# Patient Record
Sex: MALE | Race: WHITE | NOT HISPANIC OR LATINO | Employment: OTHER | URBAN - METROPOLITAN AREA
[De-identification: names, ages, dates, MRNs, and addresses within clinical notes are randomized per-mention and may not be internally consistent; named-entity substitution may affect disease eponyms.]

---

## 2017-02-14 RX ORDER — NEBIVOLOL HYDROCHLORIDE 5 MG/1
TABLET ORAL
Qty: 90 TABLET | Refills: 0 | Status: SHIPPED | OUTPATIENT
Start: 2017-02-14 | End: 2017-06-01 | Stop reason: SDUPTHER

## 2017-06-01 RX ORDER — NEBIVOLOL 5 MG/1
5 TABLET ORAL DAILY
Qty: 70 TABLET | Refills: 0 | Status: SHIPPED | OUTPATIENT
Start: 2017-06-01 | End: 2017-08-08 | Stop reason: SDUPTHER

## 2017-06-26 ENCOUNTER — APPOINTMENT (OUTPATIENT)
Dept: CT IMAGING | Facility: HOSPITAL | Age: 72
End: 2017-06-26

## 2017-06-26 ENCOUNTER — APPOINTMENT (OUTPATIENT)
Dept: GENERAL RADIOLOGY | Facility: HOSPITAL | Age: 72
End: 2017-06-26

## 2017-06-26 ENCOUNTER — HOSPITAL ENCOUNTER (EMERGENCY)
Facility: HOSPITAL | Age: 72
Discharge: HOME | End: 2017-06-26
Attending: EMERGENCY MEDICINE | Admitting: EMERGENCY MEDICINE

## 2017-06-26 VITALS
BODY MASS INDEX: 21.42 KG/M2 | SYSTOLIC BLOOD PRESSURE: 126 MMHG | RESPIRATION RATE: 16 BRPM | HEART RATE: 80 BPM | OXYGEN SATURATION: 100 % | HEIGHT: 71 IN | DIASTOLIC BLOOD PRESSURE: 84 MMHG | WEIGHT: 153 LBS | TEMPERATURE: 98.3 F

## 2017-06-26 DIAGNOSIS — S00.81XA ABRASION OF CHIN, INITIAL ENCOUNTER: Primary | ICD-10-CM

## 2017-06-26 DIAGNOSIS — S00.81XA ABRASION OF CHEEK, INITIAL ENCOUNTER: ICD-10-CM
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
## 2017-06-26 DIAGNOSIS — S42.202A CLOSED FRACTURE OF PROXIMAL END OF LEFT HUMERUS, UNSPECIFIED FRACTURE MORPHOLOGY, INITIAL ENCOUNTER: ICD-10-CM

## 2017-06-26 PROCEDURE — 99284 EMERGENCY DEPT VISIT MOD MDM: CPT

## 2017-06-26 PROCEDURE — 73030 X-RAY EXAM OF SHOULDER: CPT

## 2017-06-26 PROCEDURE — 70486 CT MAXILLOFACIAL W/O DYE: CPT

## 2017-06-26 RX ORDER — OXYCODONE HYDROCHLORIDE AND ACETAMINOPHEN 5; 325 MG/1; MG/1
1 TABLET ORAL ONCE
Status: COMPLETED | OUTPATIENT
Start: 2017-06-26 | End: 2017-06-26

## 2017-06-26 RX ORDER — OXYCODONE HYDROCHLORIDE AND ACETAMINOPHEN 5; 325 MG/1; MG/1
1-2 TABLET ORAL EVERY 6 HOURS PRN
Qty: 30 TABLET | Refills: 0 | Status: SHIPPED | OUTPATIENT
Start: 2017-06-26 | End: 2017-08-08

## 2017-06-26 RX ADMIN — OXYCODONE HYDROCHLORIDE AND ACETAMINOPHEN 1 TABLET: 5; 325 TABLET ORAL at 20:27

## 2017-06-26 NOTE — ED NOTES
C-collar removed per Dr. Thomason verbal order due to patient not having neck pain/tenderness.      Milana Avila RN  06/26/17 3775

## 2017-06-26 NOTE — ED PROVIDER NOTES
EMERGENCY DEPARTMENT ENCOUNTER    CHIEF COMPLAINT  Chief Complaint: fall  History given by: patient  History limited by: nothing  Room Number: 28/28  PMD: Bear Quiles MD  Orthopedist: Dr. Camacho    HPI:  Pt is a 71 y.o. male who presents complaining of L shoulder pain and facial abrasions (chin and L cheek) s/p [place]. Pt also c/o R jaw pain. Denies LOC, leg pain, dental pain, numbness, and loss of sensation.     Duration:  Few hours  Onset: sudden  Timing: constant  [place]  Quality: mechanical slip and fall  Intensity/Severity: moderate  Progression: unchanged  Associated Symptoms: L shoulder pain, facial abrasion (chin and L cheek)  Previous Episodes: none  Treatment before arrival: none    PAST MEDICAL HISTORY  Active Ambulatory Problems     Diagnosis Date Noted   • Disorder of aorta 08/03/2016   • Hypertension 08/03/2016   • Sleep apnea 08/03/2016   • Aortic valve insufficiency 08/03/2016   • ST segment depression 08/03/2016   • Nonrheumatic aortic valve stenosis 08/03/2016   • Paroxysmal atrial fibrillation 08/03/2016   • Hx of nodular lymphoma 11/14/2016     Resolved Ambulatory Problems     Diagnosis Date Noted   • No Resolved Ambulatory Problems     Past Medical History:   Diagnosis Date   • Aortic insufficiency    • Heart murmur    • Lymphoma, non-Hodgkin's    • Polyarthralgia 06/1997       PAST SURGICAL HISTORY  Past Surgical History:   Procedure Laterality Date   • AXILLARY LYMPH NODE BIOPSY/EXCISION Left 12/1996   • HEMORRHOIDECTOMY  1976, 1977   • PROSTATE BIOPSY  03/2007       FAMILY HISTORY  Family History   Problem Relation Age of Onset   • Heart failure Mother    • Heart failure Father    • Lymphoma Maternal Aunt        SOCIAL HISTORY  Social History     Social History   • Marital status:      Spouse name: N/A   • Number of children: 1   • Years of education: N/A     Occupational History   •       Social  History Main Topics   • Smoking status: Never Smoker   • Smokeless tobacco: Not on file   • Alcohol use Yes      Comment: Occasional beer   • Drug use: Not on file   • Sexual activity: Not on file     Other Topics Concern   • Not on file     Social History Narrative       ALLERGIES  Review of patient's allergies indicates no known allergies.    REVIEW OF SYSTEMS  Review of Systems   Constitutional: Negative for activity change, appetite change and fever.   HENT: Negative for congestion, dental problem and sore throat.         R jaw pain   Eyes: Negative.    Respiratory: Negative for cough and shortness of breath.    Cardiovascular: Negative for chest pain and leg swelling.   Gastrointestinal: Negative for abdominal pain, diarrhea and vomiting.   Endocrine: Negative.    Genitourinary: Negative for decreased urine volume and dysuria.   Musculoskeletal: Positive for arthralgias (L shoulder). Negative for neck pain.   Skin: Positive for wound (abrasion to chin and L cheek). Negative for rash.   Allergic/Immunologic: Negative.    Neurological: Negative for syncope, weakness, numbness and headaches.   Hematological: Negative.    Psychiatric/Behavioral: Negative.    All other systems reviewed and are negative.      PHYSICAL EXAM  ED Triage Vitals   Temp Heart Rate Resp BP SpO2   06/26/17 1635 06/26/17 1635 06/26/17 1635 06/26/17 1635 06/26/17 1635   98.3 °F (36.8 °C) 80 16 114/76 97 %      Temp src Heart Rate Source Patient Position BP Location FiO2 (%)   06/26/17 1635 06/26/17 1635 -- -- --   Tympanic Monitor          Physical Exam   Constitutional: He is oriented to person, place, and time and well-developed, well-nourished, and in no distress. No distress.   HENT:   R mandible tenderness. Tenderness, swelling, and abrasion on chin. Abrasion over L maxilla.    Eyes: EOM are normal.   Neck: Normal range of motion.   Cardiovascular: Normal rate and regular rhythm.    Pulmonary/Chest: Effort normal and breath sounds normal.  No respiratory distress.   Musculoskeletal:   Neck is nontender. Tenderness over L shoulder with limited ROM. NVI.    Neurological: He is alert and oriented to person, place, and time. He has normal sensation and normal strength.   Skin: Skin is warm and dry.   Psychiatric: Mood and affect normal.   Nursing note and vitals reviewed.      RADIOLOGY  CT Facial Bones Without Contrast   Preliminary Result   1.  No facial bone fractures are seen.   2.  Minimal mucosal thickening in the maxillary sinuses.   3.  Tiny radiodensities in the skin of the lower face. Please correlate.          XR Shoulder 2+ View Left     FINDINGS: There is a fracture through the neck of the humerus with  slight rotation of the humeral head relative to the humeral shaft.     This report was finalized on 6/26/2017 6:43 PM by Dr. Jorge Kendrick MD.        I ordered the above noted radiological studies. Interpreted by radiologist. Discussed with radiologist. Reviewed by me in PACS.       PROCEDURES  Procedures      PROGRESS AND CONSULTS  ED Course     7:46 PM: Ordered CT facial bones for further evaluation.    8:08 PM: Ordered Percocet for pain management.    8:45 PM:  BP: 137/86 HR: 82 Temp: 98.3 °F (36.8 °C) (Tympanic) O2 sat: 100% on RA  Rechecked pt. Notified pt that CT facial bones does not show any signs of fracture, but there are some possible dense foreign bodies in the soft tissue.  There are only a couple of small superficial abrasions to the chin and they do not match up with the location of this material seen on the CT.   D/w pt plan to consult oral and maxillofacial    8:57 PM: Placed call to oral and maxillofacial for consult    9:38 PM:  Discussed pt's case with Dr. Shakir Vaughn (Oral and Maxillofacial Surgery), who agrees to see patient in office for eval.      9:43 PM: Placed call to ortho for consult.  Discussed with Dr. Wilson.  Will have patient follow up with Dr. Hung in the office.    9:54 PM:  BP: 137/86 HR: 82 Temp:  98.3 °F (36.8 °C) (Tympanic) O2 sat: 100% on RA    Rechecked pt. D/w pt plan for discharge with splint. Pt to f/u with Oral and Maxillofacial Surgery and with Orthopedics. Pt understands and agrees with the plan, all questions answered.       MEDICAL DECISION MAKING  Results were reviewed/discussed with the patient and they were also made aware of online access.    MDM  Number of Diagnoses or Management Options  Abrasion of cheek, initial encounter:   Abrasion of chin, initial encounter:   Fall down hill, initial encounter:   Humeral head fracture, left, closed, initial encounter:      Amount and/or Complexity of Data Reviewed  Tests in the radiology section of CPT®: reviewed and ordered (XR L shoulder shows humeral neck fracture. CT facial bones does not show any fractures.)           DIAGNOSIS  Final diagnoses:   Abrasion of chin, initial encounter   Abrasion of cheek, initial encounter   Fall down hill, initial encounter   Closed fracture of proximal end of left humerus, unspecified fracture morphology, initial encounter       DISPOSITION  DISCHARGE    Patient discharged in stable condition.    Reviewed implications of results, diagnosis, meds, responsibility to follow up, warning signs and symptoms of possible worsening, potential complications and reasons to return to ER.    Patient/Family voiced understanding of above instructions.    Discussed plan for discharge, as there is no emergent indication for admission.  Pt/family is agreeable and understands need for follow up and repeat testing.  Pt is aware that discharge does not mean that nothing is wrong but it indicates no emergency is present that requires admission and they must continue care with follow-up as given below or physician of their choice.     FOLLOW-UP  Shakir Vaughn, IVONE  [address] [id number]  [phone]    Schedule an appointment as soon as possible for a visit      Vikas Wilson MD  [address] [facility]  [facility]  [address]  [place] [id number] [phone]    Schedule an appointment as soon as possible for a visit      Bear Quiles MD  [facility] [address] [id number]  [phone]      As needed         Medication List      New Prescriptions          oxyCODONE-acetaminophen 5-325 MG per tablet   Commonly known as:  PERCOCET   Take 1-2 tablets by mouth Every 6 (Six) Hours As Needed for Moderate Pain   (4-6).             Latest Documented Vital Signs:  As of 10:07 PM  BP- 137/86 HR- 82 Temp- 98.3 °F (36.8 °C) (Tympanic) O2 sat- 100%    --  Documentation assistance provided by jeremy Meza for Dr. Thomason.  Information recorded by the scribe was done at my direction and has been verified and validated by me.     Ermias Meza  06/26/17 2208       Carlos Thomason MD  06/28/17 0033

## 2017-06-27 ENCOUNTER — TELEPHONE (OUTPATIENT)
Dept: ORTHOPEDIC SURGERY | Facility: CLINIC | Age: 72
End: 2017-06-27

## 2017-06-28 ENCOUNTER — OFFICE VISIT (OUTPATIENT)
Dept: ORTHOPEDIC SURGERY | Facility: CLINIC | Age: 72
End: 2017-06-28

## 2017-06-28 VITALS — BODY MASS INDEX: 21.7 KG/M2 | WEIGHT: 155 LBS | TEMPERATURE: 99.3 F | HEIGHT: 71 IN

## 2017-06-28 DIAGNOSIS — S49.92XA SHOULDER INJURY, LEFT, INITIAL ENCOUNTER: Primary | ICD-10-CM

## 2017-06-28 PROCEDURE — 73030 X-RAY EXAM OF SHOULDER: CPT | Performed by: ORTHOPAEDIC SURGERY

## 2017-06-28 PROCEDURE — 99204 OFFICE O/P NEW MOD 45 MIN: CPT | Performed by: ORTHOPAEDIC SURGERY

## 2017-06-28 PROCEDURE — 23600 CLTX PROX HUMRL FX W/O MNPJ: CPT | Performed by: ORTHOPAEDIC SURGERY

## 2017-06-28 RX ORDER — SERTRALINE HYDROCHLORIDE 100 MG/1
100 TABLET, FILM COATED ORAL DAILY
COMMUNITY
End: 2020-02-11

## 2017-06-28 RX ORDER — CLONAZEPAM 0.5 MG/1
0.5 TABLET ORAL
COMMUNITY
End: 2019-12-17

## 2017-06-29 NOTE — PROGRESS NOTES
History & Physical       Patient: Leroy Richardson    YOB: 1945    Medical Record Number: [id number]    Attending Physician: No att. providers found    Chief Complaints: Left shoulder injury    History of Present Illness: 71 y.o. male presents for evaluation of his left shoulder.  He fell on June 26 while golfing.  He tells me that he struck his face and left shoulder as well as the anterior aspect of both knees.  The face and knees are doing fine.  The shoulder has continued to cause him pain.  He was seen in the emergency room and diagnosed with a proximal humerus fracture.  I was on call and so he was subsequently referred here.  He describes his current shoulder pain as moderate, intermittent and aching.  He has noticed associated bruising and swelling.  The pain is somewhat better with ice, anti-inflammatories and use of the sling.  Of note, he is active and independent.  He is retired but he continues to be quite active in his intermediate.  He is right-hand-dominant.    Allergies: No Known Allergies    Home Medications:      Current Outpatient Prescriptions:   •  aspirin (ASPIRIN ADULT LOW STRENGTH) 81 MG EC tablet, Take  by mouth daily., Disp: , Rfl:   •  clonazePAM (KlonoPIN) 0.5 MG tablet, Take 0.5 mg by mouth 2 (Two) Times a Day As Needed for Seizures., Disp: , Rfl:   •  finasteride (PROSCAR) 5 MG tablet, Take 5 mg by mouth daily., Disp: , Rfl:   •  nebivolol (BYSTOLIC) 5 MG tablet, Take 1 tablet by mouth Daily., Disp: 70 tablet, Rfl: 0  •  Omega-3 Fatty Acids (FISH OIL) 1200 MG capsule capsule, Take  by mouth., Disp: , Rfl:   •  oxyCODONE-acetaminophen (PERCOCET) 5-325 MG per tablet, Take 1-2 tablets by mouth Every 6 (Six) Hours As Needed for Moderate Pain (4-6)., Disp: 30 tablet, Rfl: 0  •  sertraline (ZOLOFT) 100 MG tablet, Take 100 mg by mouth Daily., Disp: , Rfl:     Past Medical History:   Diagnosis Date   • Aortic insufficiency     Mild   • Heart murmur    • Lymphoma,  "non-Hodgkin's    • Polyarthralgia 06/1997          Past Surgical History:   Procedure Laterality Date   • AXILLARY LYMPH NODE BIOPSY/EXCISION Left 12/1996   • HEMORRHOIDECTOMY  1976, 1977   • PROSTATE BIOPSY  03/2007          Social History     Occupational History   •       Social History Main Topics   • Smoking status: Never Smoker   • Smokeless tobacco: Not on file   • Alcohol use Yes      Comment: Occasional beer   • Drug use: Not on file   • Sexual activity: Defer      Social History     Social History Narrative          Family History   Problem Relation Age of Onset   • Heart failure Mother    • Heart failure Father    • Lymphoma Maternal Aunt        Review of Systems:      Constitutional: Denies fever, shaking or chills   Eyes: Denies change in visual acuity   HEENT: Denies nasal congestion or sore throat   Respiratory: Denies cough or shortness of breath   Cardiovascular: Denies chest pain or edema  Endocrine: Denies tremors, palpitations, intolerance of heat or cold, polyuria, polydipsia.  GI: Denies abdominal pain, nausea, vomiting, bloody stools or diarrhea  : Denies frequency, urgency, incontinence, retention, or nocturia.  Musculoskeletal: Denies numbness tingling or loss of motor function except as above  Integument: Denies rash, lesion or ulceration   Neurologic: Denies headache or focal weakness, deficits  Heme: Denies epistaxis, spontaneous or excessive bleeding, epistaxis, hematuria, melena, fatigue, enlarged or tender lymph nodes.      All other pertinent positives and negatives as noted above in HPI.    Physical Exam: 71 y.o. male    Vitals:    06/28/17 1324   Temp: 99.3 °F (37.4 °C)   TempSrc: Temporal Artery    Weight: 155 lb (70.3 kg)   Height: 71\" (180.3 cm)       General:  Patient is awake and alert.  He is oriented to person, place, and time.  He does have a facial contusion and abrasion over the left side of his face.  Appears in no acute distress or discomfort.    Psych:  " Affect and demeanor are appropriate.    Eyes:  Conjunctiva and sclera appear grossly normal.  Eyes track well and EOM seem to be intact.    Dentition:  No gross abnormalities noted.    Ears:  No gross abnormalities.  Hearing adequate for the exam.    Cardiovascular:  Regular rate and rhythm.    Lungs:  Good chest expansion.  Breathing unlabored.    Lymph:  No palpable masses or adenopathy in the affected extremity    Left upper extremity:  Slingwas in place and removed.  Skin appears benign.  No gross malalignment, lacerations or abrasions.  Focal tenderness noted over the proximal humerus.  No palpable masses or adenopathy.  Compartments soft.  Painful, limited ROM of the shoulder.  Could not assess instability due to his discomfort with movement..  No tenderness noted over the lower arm, elbow, forearm or wrist.  Good strength in the wrist and hand with flexion, extension, , and pinch.  Intact sensation.  Brisk cap refill.  Good skin turgor.  Palpable radial pulse.    Bilateral lower extremities are briefly examined.  He has abrasions and contusions over the anterior aspect of both knees.  He is mildly tender in this area but there is no discrete bony tenderness nor are there any step-offs or crepitus.  He has full symmetric knee motion.  He has excellent strength with resisted knee extension.  His gait is grossly normal.      Diagnostic Tests:  Lab Results   Component Value Date    GLUCOSE 92 11/14/2016    CALCIUM 9.6 11/14/2016     11/14/2016    K 4.8 (H) 11/14/2016    CO2 26.2 11/14/2016    CL 95 (L) 11/14/2016    BUN 12 11/14/2016    CREATININE 0.73 11/14/2016    EGFRIFNONA 107 11/14/2016    BCR 16.4 11/14/2016    ANIONGAP 12.8 11/14/2016     Lab Results   Component Value Date    WBC 7.41 11/14/2016    HGB 14.9 11/14/2016    HCT 42.3 11/14/2016    MCV 86.0 11/14/2016     11/14/2016     No results found for: INR, PROTIME    Imaging:  Outside x-rays on the [facility] are reviewed.  These are  compared to repeat AP and scapular Y x-rays taken today in the office.  Postoperative x-rays show an impacted but minimally displaced three-part proximal humerus fracture.    Assessment:  1.  Left proximal humerus fracture  2.  Bilateral knee contusions      Plan: We had a thorough discussion regarding the risks of non-surgical management versus surgery.  With closed treatment, there is the risk of further displacement, malunion, nonunion or persistent pain or loss of motion/function that could require further intervention in the future.  I feel that this injury is amenable to closed treatment.  If it heals in his current alignment, I expect that he should do well.  We do however need to follow him closely.  I want to see him back in another week for repeat x-rays.  If things displace, we may have to reevaluate.  The patient voiced understanding of the risks, benefits, and alternative forms of treatment that were discussed and the patient consents to proceed with closed treatment.  We will have him continue use of the sling.  Again, I will see him back next week for repeat x-rays.    Date: 6/29/2017    Jarrett Hung MD    CC to Bear Quiles MD

## 2017-07-10 ENCOUNTER — OFFICE VISIT (OUTPATIENT)
Dept: ORTHOPEDIC SURGERY | Facility: CLINIC | Age: 72
End: 2017-07-10

## 2017-07-10 VITALS — WEIGHT: 155 LBS | BODY MASS INDEX: 21.7 KG/M2 | HEIGHT: 71 IN

## 2017-07-10 DIAGNOSIS — Z87.81 HISTORY OF FRACTURE OF HUMERUS: Primary | ICD-10-CM

## 2017-07-10 PROCEDURE — 99024 POSTOP FOLLOW-UP VISIT: CPT | Performed by: ORTHOPAEDIC SURGERY

## 2017-07-10 PROCEDURE — 73030 X-RAY EXAM OF SHOULDER: CPT | Performed by: ORTHOPAEDIC SURGERY

## 2017-07-10 NOTE — PROGRESS NOTES
Mr. Richardson comes in today for follow-up of the left shoulder.  He tells me that he has been fully compliant with use of the sling.  Denies any problems or issues.    He has some resolving ecchymosis down the arm.  Contour of the arm is normal.  Pendulums are well-tolerated.    AP and scapular Y views of the left shoulder are ordered and reviewed.  These are compared to previous x-rays.  No concerning findings noted.  Alignment appears unchanged.    Assessment: 2 weeks status post closed treatment of left proximal humerus fracture    Plan: He seems to be doing well.  I will have him follow-up with me in another 2 weeks.  I instructed him on early range of motion exercises at this point as well as appropriate continued activity restrictions.

## 2017-07-11 ENCOUNTER — TELEPHONE (OUTPATIENT)
Dept: ORTHOPEDIC SURGERY | Facility: CLINIC | Age: 72
End: 2017-07-11

## 2017-07-24 ENCOUNTER — OFFICE VISIT (OUTPATIENT)
Dept: ORTHOPEDIC SURGERY | Facility: CLINIC | Age: 72
End: 2017-07-24

## 2017-07-24 VITALS — HEIGHT: 71 IN | WEIGHT: 154 LBS | BODY MASS INDEX: 21.56 KG/M2 | TEMPERATURE: 98.2 F

## 2017-07-24 DIAGNOSIS — S42.202D CLOSED FRACTURE OF PROXIMAL END OF LEFT HUMERUS WITH ROUTINE HEALING, UNSPECIFIED FRACTURE MORPHOLOGY, SUBSEQUENT ENCOUNTER: Primary | ICD-10-CM

## 2017-07-24 PROCEDURE — 73030 X-RAY EXAM OF SHOULDER: CPT | Performed by: ORTHOPAEDIC SURGERY

## 2017-07-24 PROCEDURE — 99024 POSTOP FOLLOW-UP VISIT: CPT | Performed by: ORTHOPAEDIC SURGERY

## 2017-07-24 NOTE — PROGRESS NOTES
"Leroy Richardson : 1945 MRN: [id number] DATE: 2017    CC: Closed treatment of left proximal humerus fracture    HPI: Pt. returns to clinic today for follow-up.  Ports that the pain continues to improve and is minimal at this point.  Motion is progressing.  Denies any new concerns or issues.  Vitals:    17 0939   Temp: 98.2 °F (36.8 °C)   TempSrc: Temporal Artery    Weight: 154 lb (69.9 kg)   Height: 71\" (180.3 cm)     Exam:   Contour of shoulder appears normal.  Skin intact and benign.  Arm and forearm soft.  Shoulder motion is limited but well tolerated.  Good motor and sensory function distally.  Palpable pulses with good cap refill.      Imaging   2v xrays including AP and scapular Y are ordered and reviewed by me to evaluate alignment and for comparison purposes.  No new or concerning findings noted. Fracture appears in unchanged alignment and there has been interval callous formation    Impression:   6 weeks s/p closed treatment of left proximal humerus fracture    Plan:    1.  Continue to work on progressive range of motion.  Will start formal Pt.  2.  F/u in 6 weeks with repeat 2v xrays.  3.  Counseled the patient about appropriate activity modifications and restrictions, including noheavy lifting, pushing or pulling    Jarrett Hung MD   "

## 2017-07-31 ENCOUNTER — TREATMENT (OUTPATIENT)
Dept: PHYSICAL THERAPY | Facility: CLINIC | Age: 72
End: 2017-07-31

## 2017-07-31 DIAGNOSIS — M25.512 CHRONIC LEFT SHOULDER PAIN: ICD-10-CM

## 2017-07-31 DIAGNOSIS — S42.295D OTHER CLOSED NONDISPLACED FRACTURE OF PROXIMAL END OF LEFT HUMERUS WITH ROUTINE HEALING, SUBSEQUENT ENCOUNTER: Primary | ICD-10-CM

## 2017-07-31 DIAGNOSIS — G89.29 CHRONIC LEFT SHOULDER PAIN: ICD-10-CM

## 2017-07-31 PROCEDURE — 97110 THERAPEUTIC EXERCISES: CPT | Performed by: PHYSICAL THERAPIST

## 2017-07-31 PROCEDURE — 97161 PT EVAL LOW COMPLEX 20 MIN: CPT | Performed by: PHYSICAL THERAPIST

## 2017-07-31 PROCEDURE — G8984 CARRY CURRENT STATUS: HCPCS | Performed by: PHYSICAL THERAPIST

## 2017-07-31 PROCEDURE — G8985 CARRY GOAL STATUS: HCPCS | Performed by: PHYSICAL THERAPIST

## 2017-07-31 NOTE — PATIENT INSTRUCTIONS
Please view [facility] Leroy Richardson for a complete list of HEP instructions.  Healing time frames, A&P of symptoms.   PT course of care, treatment outcomes.

## 2017-07-31 NOTE — PROGRESS NOTES
Physical Therapy Initial Evaluation    Patient Name: Leroy Richardson       Patient MRN: [id number]  : 1945  Physician:Jarrett Hung MD  Date: 2017    Encounter Diagnoses   Name Primary?   • Other closed nondisplaced fracture of proximal end of left humerus with routine healing, subsequent encounter Yes   • Chronic left shoulder pain        Subjective Evaluation    History of Present Illness  Date of onset: 2017  Mechanism of injury: Pt reports he fell on 2017 when playing golf, going down a slope and fell face down.  Went to the [facility] ER, did xrays of the shoulder and CT scan of the head.   Went to see Dr. Hung, put in sling 1 month, came out of the sling last week. Reports he was doing pendulums when in the sling and has been doing wall walks since last week.   Pt reports he has had 3 falls in the last year.   Pt denies N&T or waking up at night due to pain.   PMH: Non-Hodgkins lympohoma , been in remission since . Hx of hear murmur. Sees oncologist and cardiologist regularly       Patient Occupation: Part time for [employer]   Precautions and Work Restrictions: No lifting more than 2 lbs. Quality of life: excellent    Pain  At best pain ratin  At worst pain ratin  Location: Middle deltoid  Quality: Stabbing.  Relieving factors: ice  Aggravating factors: lifting (Wall walk exercise)  Progression: improved    Social Support  Lives in: one-story house  Lives with: spouse    Hand dominance: right    Diagnostic Tests  X-ray: abnormal (Proximal humerus )  CT scan: normal (Head)    Treatments  Previous treatment: medication (PT for balance. Not currently taking any meds)  Patient Goals  Patient goals for therapy: decreased pain  Patient goal: 2 weeks. Pt will:  1. Be independent with initial HEP  2. Report pain </= 5/10 with all ADL's  3. Demonstrate improved left GH flexion AROM >/= 115  4. Demonstrate improved left GH abduction AROM >/= 90    4 weeks. Pt will:  1. Report  pain of </= 1/10 with all ADLs  2. Demonstrate improved left GH flexion, abduction, ER MMT of >/= 4+/5  3. Demonstrate improved left GH AROM flexion of >/= 135  4. Return to tucking in shirt/putting on a belt with the left arm with no increased pain  5. QuickDASH </= 40%, corresponding with  CJ           Objective     Postural Observations  Seated posture: fair (Moderate increased thoracic kyphosis)        Observations     Additional Observation Details  Mild eccymosis at left anterior deltoid    Palpation   Left   Tenderness of the middle deltoid.     Cervical/Thoracic Screen   Thoracic range of motion within normal limits with the following exceptions: Limited extension    Neurological Testing   Sensation     Shoulder   Left Shoulder   Intact: light touch    Right Shoulder   Intact: light touch    Reflexes   Left   Biceps (C5/C6): normal (2+)    Right   Biceps (C5/C6): normal (2+)    Active Range of Motion   Left Shoulder   Flexion: 90 degrees with pain  Abduction: 74 degrees with pain  External rotation 45°: 64 degrees   Internal rotation BTB: L4     Right Shoulder   Flexion: 145 degrees   Abduction: 130 degrees   External rotation 45°: 88 degrees   Internal rotation BTB: T10     Passive Range of Motion   Left Shoulder   Flexion: 120 degrees   Abduction: 90 degrees   External rotation 45°: 65 degrees     Additional Passive Range of Motion Details  All PROM limited due to pain    Strength/Myotome Testing     Left Shoulder     Planes of Motion   Flexion: 2+   Abduction: 2+   External rotation at 0°: 3+ (PAIN)   Internal rotation at 0°: 4     Isolated Muscles   Biceps: 4+   Triceps: 4     Right Shoulder     Planes of Motion   Flexion: 4+   Abduction: 4+   External rotation at 0°: 4+   Internal rotation at 0°: 4+     Isolated Muscles   Biceps: 5   Triceps: 5     Tests     Additional Tests Details  All special testing deferred       Assessment & Plan     Assessment  Impairments: abnormal or restricted ROM,  impaired physical strength, lacks appropriate home exercise program and pain with function  Assessment details: Leroy Richardson is a pleasant 71 y.o. male that presents with signs and symptoms consistent with the above diagnosis.   Pt will benefit from skilled PT services in order to address listed impairments and increase tolerance to normal daily activities including ADL's, work and recreational activities.    Prognosis: good    Goals  2 weeks. Pt will:  1. Be independent with initial HEP  2. Report pain </= 5/10 with all ADL's  3. Demonstrate improved left GH flexion AROM >/= 115  4. Demonstrate improved left GH abduction AROM >/= 90    4 weeks. Pt will:  1. Report pain of </= 1/10 with all ADLs  2. Demonstrate improved left GH flexion, abduction, ER MMT of >/= 4+/5  3. Demonstrate improved left GH AROM flexion of >/= 135  4. Return to tucking in shirt/putting on a belt with the left arm with no increased pain  5. QuickDASH </= 40%, corresponding with  CJ      Plan  Therapy options: will be seen for skilled physical therapy services  Planned modality interventions: cryotherapy, electrical stimulation/Burmese stimulation, ultrasound and thermotherapy (hydrocollator packs)  Planned therapy interventions: manual therapy, neuromuscular re-education, postural training, soft tissue mobilization, spinal/joint mobilization, stretching, strengthening, joint mobilization and home exercise program  Frequency: 2x week  Duration in weeks: 4  Treatment plan discussed with: patient  Functional Limitations: carrying objects, lifting, pulling, pushing, uncomfortable because of pain, reaching behind back and reaching overhead      Therapy Exercise 30930 10 minutes    Timed Code Treatment: 10   Minutes     Total Treatment Time: 50      Minutes    Lashell Parry, PT, DPT  Physical Therapist    Medicare Initial Certification  Certification Period: 10/29/2017  I certify that the therapy services are furnished while this  patient is under my care.  The services outlined above are required by this patient, and will be reviewed every 90 days.     PHYSICIAN: Jarrett Hung MD      DATE:     Please sign and return via fax to [phone].. Thank you, [facility].

## 2017-08-04 ENCOUNTER — TREATMENT (OUTPATIENT)
Dept: PHYSICAL THERAPY | Facility: CLINIC | Age: 72
End: 2017-08-04

## 2017-08-04 DIAGNOSIS — M25.512 CHRONIC LEFT SHOULDER PAIN: ICD-10-CM

## 2017-08-04 DIAGNOSIS — G89.29 CHRONIC LEFT SHOULDER PAIN: ICD-10-CM

## 2017-08-04 DIAGNOSIS — S42.295D OTHER CLOSED NONDISPLACED FRACTURE OF PROXIMAL END OF LEFT HUMERUS WITH ROUTINE HEALING, SUBSEQUENT ENCOUNTER: Primary | ICD-10-CM

## 2017-08-04 PROCEDURE — 97110 THERAPEUTIC EXERCISES: CPT | Performed by: PHYSICAL THERAPIST

## 2017-08-04 PROCEDURE — 97140 MANUAL THERAPY 1/> REGIONS: CPT | Performed by: PHYSICAL THERAPIST

## 2017-08-04 NOTE — PROGRESS NOTES
Physical Therapy Daily Progress Note      Leroy Richardson reports: compliance with HEP. Denies pain, reports soreness with the exercises.       Objective   See Exercise, Manual, and Modality Logs for complete treatment.     Assessment/Plan  Pt performed well with all progressed UE strengthening, denied pain.  Pt demonstrate improvement in AAROM since initialy visit.  Pt would benefit from additional skilled PT to continue to progress left UE ROM and strength.    Progress strengthening /stabilization /functional activity    Manual PT [id number] 10 minutes and Therapy Exercise [id number] 30 minutes    Timed Code Treatment: 40   Minutes     Total Treatment Time: 50      Minutes    Lashell Parry, PT, DPT  Physical Therapist #[id number]

## 2017-08-08 ENCOUNTER — OFFICE VISIT (OUTPATIENT)
Dept: CARDIOLOGY | Facility: CLINIC | Age: 72
End: 2017-08-08

## 2017-08-08 VITALS
HEIGHT: 71 IN | SYSTOLIC BLOOD PRESSURE: 125 MMHG | HEART RATE: 69 BPM | DIASTOLIC BLOOD PRESSURE: 76 MMHG | WEIGHT: 156 LBS | BODY MASS INDEX: 21.84 KG/M2

## 2017-08-08 DIAGNOSIS — I48.0 PAROXYSMAL ATRIAL FIBRILLATION (HCC): ICD-10-CM

## 2017-08-08 DIAGNOSIS — I35.1 NONRHEUMATIC AORTIC VALVE INSUFFICIENCY: ICD-10-CM

## 2017-08-08 DIAGNOSIS — I48.92 ATRIAL FIBRILLATION AND FLUTTER (HCC): Primary | ICD-10-CM

## 2017-08-08 DIAGNOSIS — I10 ESSENTIAL HYPERTENSION: ICD-10-CM

## 2017-08-08 DIAGNOSIS — G47.33 OBSTRUCTIVE SLEEP APNEA SYNDROME: ICD-10-CM

## 2017-08-08 DIAGNOSIS — I10 HTN (HYPERTENSION), BENIGN: ICD-10-CM

## 2017-08-08 DIAGNOSIS — I35.0 NONRHEUMATIC AORTIC VALVE STENOSIS: ICD-10-CM

## 2017-08-08 DIAGNOSIS — I48.91 ATRIAL FIBRILLATION AND FLUTTER (HCC): Primary | ICD-10-CM

## 2017-08-08 PROCEDURE — 93000 ELECTROCARDIOGRAM COMPLETE: CPT | Performed by: INTERNAL MEDICINE

## 2017-08-08 PROCEDURE — 99214 OFFICE O/P EST MOD 30 MIN: CPT | Performed by: INTERNAL MEDICINE

## 2017-08-08 RX ORDER — NEBIVOLOL 5 MG/1
5 TABLET ORAL DAILY
Qty: 90 TABLET | Refills: 3 | Status: SHIPPED | OUTPATIENT
Start: 2017-08-08 | End: 2019-12-17 | Stop reason: SDUPTHER

## 2017-08-08 NOTE — PROGRESS NOTES
[facility]  Cardiology Office Visit Note        Subjective:     Encounter Date:2017      Patient ID: Leroy Richardson   Age: 71 y.o.  Sex: male  :  1945  MRN: [id number]             Date of Office Visit: 2017  Encounter Provider: Willie Cox MD  Place of Service: [facility]     .    Chief Complaint:  History of Present Illness    The following portions of the patient's history were reviewed and updated as appropriate: allergies, current medications, past family history, past medical history, past social history, past surgical history and problem list.    Review of Systems   Constitution: Negative for chills, fever and weight gain.   HENT: Negative for congestion, headaches, hearing loss and sore throat.    Eyes: Negative for blurred vision and double vision.   Cardiovascular: Negative for chest pain, claudication, cyanosis, dyspnea on exertion, irregular heartbeat, leg swelling, near-syncope, orthopnea, palpitations, paroxysmal nocturnal dyspnea and syncope.   Respiratory: Positive for snoring. Negative for cough, shortness of breath and wheezing.    Endocrine: Negative for cold intolerance.   Hematologic/Lymphatic: Negative for adenopathy. Does not bruise/bleed easily.   Skin: Negative for rash.   Musculoskeletal: Negative for back pain.   Gastrointestinal: Negative for abdominal pain, change in bowel habit, constipation, diarrhea, nausea and vomiting.   Genitourinary: Negative for dysuria, frequency, hematuria and hesitancy.   Neurological: Positive for loss of balance. Negative for disturbances in coordination, excessive daytime sleepiness, dizziness, focal weakness, light-headedness and numbness.   Psychiatric/Behavioral: Positive for depression. Negative for memory loss. The patient is not nervous/anxious.    Allergic/Immunologic: Negative for hives.         ECG 12 Lead  Date/Time: 2017 9:08 AM  Performed by: RAZ GARCÍA  MAN SINGH  Authorized by: MAN CRANDALL JR   Comparison: compared with previous ECG   Similar to previous ECG  Rhythm: sinus rhythm  BPM: 70  Clinical impression: normal ECG                       HPI     The patient is a 71-year-old white male with history of mild to moderate aortic stenosis and aortic regurgitation, history of sleep apnea and hypertension, who presents for cardiac follow-up.  I last saw him in the office in August 2016.  Since then, he denies chest pain.  No PND, orthopnea or pedal edema.  No palpitations dizziness or syncope.  His blood pressure has been good.  He is lost some weight due to depression and anxiety.  He is put on clonazepam and sertraline and now is better.  He is gaining weight agai he recently fell and broke his humerus on his left arm.  The cast is off at this time.    Cardiac risk factors: No family history of early CAD.  No diabetes.  No smoking.  Normal cholesterol.  Positive or hypertension.  .          Past Medical History:   Diagnosis Date   • Aortic insufficiency     Mild   • Heart murmur    • Lymphoma, non-Hodgkin's    • Polyarthralgia 06/1997       Past Surgical History:   Procedure Laterality Date   • AXILLARY LYMPH NODE BIOPSY/EXCISION Left 12/1996   • HEMORRHOIDECTOMY  1976, 1977   • PROSTATE BIOPSY  03/2007       Social History     Social History   • Marital status:      Spouse name: N/A   • Number of children: 1   • Years of education: N/A     Occupational History   •       Social History Main Topics   • Smoking status: Never Smoker   • Smokeless tobacco: Not on file   • Alcohol use Yes      Comment: Occasional beer   • Drug use: Not on file   • Sexual activity: Defer     Other Topics Concern   • Not on file     Social History Narrative       Family History   Problem Relation Age of Onset   • Heart failure Mother    • Heart failure Father    • Lymphoma Maternal Aunt            Scheduled Meds:  Current Outpatient Prescriptions on File Prior to  "Visit   Medication Sig Dispense Refill   • aspirin (ASPIRIN ADULT LOW STRENGTH) 81 MG EC tablet Take  by mouth daily.     • clonazePAM (KlonoPIN) 0.5 MG tablet Take 0.5 mg by mouth 2 (Two) Times a Day As Needed for Seizures.     • finasteride (PROSCAR) 5 MG tablet Take 5 mg by mouth daily.     • Omega-3 Fatty Acids (FISH OIL) 1200 MG capsule capsule Take  by mouth.     • sertraline (ZOLOFT) 100 MG tablet Take 100 mg by mouth Daily.     • [DISCONTINUED] nebivolol (BYSTOLIC) 5 MG tablet Take 1 tablet by mouth Daily. 70 tablet 0   • [DISCONTINUED] oxyCODONE-acetaminophen (PERCOCET) 5-325 MG per tablet Take 1-2 tablets by mouth Every 6 (Six) Hours As Needed for Moderate Pain (4-6). 30 tablet 0     No current facility-administered medications on file prior to visit.        /76  Pulse 69  Ht 71\" (180.3 cm)  Wt 156 lb (70.8 kg)  BMI 21.76 kg/m2    Objective:     Physical Exam   Constitutional: He is oriented to person, place, and time. He appears well-developed and well-nourished. No distress.   HENT:   Head: Normocephalic and atraumatic.   Right Ear: External ear normal.   Left Ear: External ear normal.   Mouth/Throat: Oropharynx is clear and moist. No oropharyngeal exudate.   Eyes: Conjunctivae and EOM are normal. Pupils are equal, round, and reactive to light. No scleral icterus.   Neck: Normal range of motion. Neck supple. No JVD present. No tracheal deviation present. No thyromegaly present.   Cardiovascular: Normal rate, regular rhythm, S1 normal, S2 normal, normal heart sounds and intact distal pulses.  PMI is not displaced.  Exam reveals no gallop, no distant heart sounds, no friction rub and no decreased pulses.    No murmur heard.  Pulmonary/Chest: Effort normal and breath sounds normal. No accessory muscle usage. No respiratory distress. He has no wheezes. He has no rales. He exhibits no tenderness.   Abdominal: Soft. Bowel sounds are normal. He exhibits no distension and no mass. There is no " tenderness. There is no rebound and no guarding.   Musculoskeletal: Normal range of motion. He exhibits no edema, tenderness or deformity.   Lymphadenopathy:     He has no cervical adenopathy.   Neurological: He is alert and oriented to person, place, and time. He has normal reflexes. No cranial nerve deficit. Coordination normal.   Skin: Skin is dry. No rash noted. He is not diaphoretic. No erythema. No pallor.   Psychiatric: He has a normal mood and affect.             Lab Review:               Lab Review:         Lab Review     No results found for: CHOL  No results found for: HDL  No results found for: LDL  No results found for: TRIG  No components found for: CHOLHDL  Lab Results   Component Value Date    GLUCOSE 92 11/14/2016    BUN 12 11/14/2016    CREATININE 0.73 11/14/2016    EGFRIFNONA 107 11/14/2016    BCR 16.4 11/14/2016    CO2 26.2 11/14/2016    CALCIUM 9.6 11/14/2016    ALBUMIN 4.50 11/14/2016    LABIL2 1.6 11/14/2016    AST 16 11/14/2016    ALT 11 11/14/2016     Lab Results   Component Value Date    GLUCOSE 92 11/14/2016    CALCIUM 9.6 11/14/2016     11/14/2016    K 4.8 (H) 11/14/2016    CO2 26.2 11/14/2016    CL 95 (L) 11/14/2016    BUN 12 11/14/2016    CREATININE 0.73 11/14/2016    EGFRIFNONA 107 11/14/2016    BCR 16.4 11/14/2016    ANIONGAP 12.8 11/14/2016     Lab Results   Component Value Date    WBC 7.41 11/14/2016    HGB 14.9 11/14/2016    HCT 42.3 11/14/2016    MCV 86.0 11/14/2016     11/14/2016     No results found for: DDIMER  No results found for: TSH, F9DUIFK, P4IWVRH, THYROIDAB  No results found for: CKTOTAL  No results found for: DIGOXIN  No results found for: CKTOTAL, CKMB, CKMBINDEX, TROPONINI, TROPONINT  No results found for: INR, PROTIME  CrCl cannot be calculated (Patient's most recent sCr result is older than the maximum 30 days allowed.).    Assessment:          Diagnosis Plan   1. Atrial fibrillation and flutter  ECG 12 Lead    Adult Transthoracic Echo Complete   2.  HTN (hypertension), benign  ECG 12 Lead   3. Nonrheumatic aortic valve insufficiency  Adult Transthoracic Echo Complete   4. Essential hypertension     5. Nonrheumatic aortic valve stenosis  Adult Transthoracic Echo Complete   6. Paroxysmal atrial fibrillation     7. Obstructive sleep apnea syndrome            Assessment and Plan:    Leroy was seen today for atrial fibrillation and hypertension.    Diagnoses and all orders for this visit:    Atrial fibrillation and flutter  -     ECG 12 Lead  -     Adult Transthoracic Echo Complete; Future    HTN (hypertension), benign  -     ECG 12 Lead    Nonrheumatic aortic valve insufficiency  -     Adult Transthoracic Echo Complete; Future    Essential hypertension    Nonrheumatic aortic valve stenosis  -     Adult Transthoracic Echo Complete; Future    Paroxysmal atrial fibrillation    Obstructive sleep apnea syndrome    Other orders  -     nebivolol (BYSTOLIC) 5 MG tablet; Take 1 tablet by mouth Daily.      The patient is doing fine from a cardiac standpoint without angina or heart failure symptoms.  ECG is normal.  No need for cardiac workup at this time.      I'll see him back in the office in one year.  He will undergo an echo at that time to follow his history of aortic valvular stenosis and regurgitation.  So, if the right ventricle is enlarged on echo, then sleep apnea may need to be or effectively treated.  Apparently, he wears a oral appliance at night.    He does have radiation of his aortic stenosis to his carotids.  His carotid arteries were normal by duplex in 2015.    A total of 25 minutes was spent in the care of this patient, including at least 13 minutes face-to-face with the patient.    I not only counseled the patient today on the significant factors noted in the assessment and plan, but I also recommended that the patient reduce salt and saturated animal fat intake in diet, as well as to perform scheduled exercise on a regular basis.      Plan:                   08/08/2017  6:42 PM  MD Willie Chua MD  8/8/2017, 6:42 PM    EMR Dragon/Transcription disclaimer:   Much of this encounter note is an electronic transcription/translation of spoken language to printed text. The electronic translation of spoken language may permit erroneous, or at times, nonsensical words or phrases to be inadvertently transcribed; Although I have reviewed the note for such errors, some may still exist.

## 2017-08-09 ENCOUNTER — TREATMENT (OUTPATIENT)
Dept: PHYSICAL THERAPY | Facility: CLINIC | Age: 72
End: 2017-08-09

## 2017-08-09 DIAGNOSIS — G89.29 CHRONIC LEFT SHOULDER PAIN: ICD-10-CM

## 2017-08-09 DIAGNOSIS — M25.512 CHRONIC LEFT SHOULDER PAIN: ICD-10-CM

## 2017-08-09 DIAGNOSIS — S42.295D OTHER CLOSED NONDISPLACED FRACTURE OF PROXIMAL END OF LEFT HUMERUS WITH ROUTINE HEALING, SUBSEQUENT ENCOUNTER: Primary | ICD-10-CM

## 2017-08-09 PROCEDURE — 97110 THERAPEUTIC EXERCISES: CPT | Performed by: PHYSICAL THERAPIST

## 2017-08-09 PROCEDURE — 97140 MANUAL THERAPY 1/> REGIONS: CPT | Performed by: PHYSICAL THERAPIST

## 2017-08-09 NOTE — PROGRESS NOTES
Physical Therapy Daily Progress Note      Leroy Richardson reports: he feels like his arm is getting better. Reports he gets sore in the upper arm with exercises.     Objective   See Exercise, Manual, and Modality Logs for complete treatment.     Assessment/Plan  Pt performed all progressed exercises with no increased pain.  Pt demonstrates improved left GH AROM, however does demonstrate scapular compensation due to weakness.  Instructed pt in progressed HEP, pt would benefit from additional skilled PT to continue to progress left GH ROM and strength.    Progress per Plan of Care    Manual PT [id number] 10 minutes and Therapy Exercise [id number] 30 minutes    Timed Code Treatment: 40   Minutes     Total Treatment Time: 55      Minutes    Lashell Parry, PT, DPT  Physical Therapist #[id number]

## 2017-08-11 ENCOUNTER — TREATMENT (OUTPATIENT)
Dept: PHYSICAL THERAPY | Facility: CLINIC | Age: 72
End: 2017-08-11

## 2017-08-11 DIAGNOSIS — S42.295D OTHER CLOSED NONDISPLACED FRACTURE OF PROXIMAL END OF LEFT HUMERUS WITH ROUTINE HEALING, SUBSEQUENT ENCOUNTER: Primary | ICD-10-CM

## 2017-08-11 DIAGNOSIS — M25.512 CHRONIC LEFT SHOULDER PAIN: ICD-10-CM

## 2017-08-11 DIAGNOSIS — G89.29 CHRONIC LEFT SHOULDER PAIN: ICD-10-CM

## 2017-08-11 PROCEDURE — 97110 THERAPEUTIC EXERCISES: CPT | Performed by: PHYSICAL THERAPIST

## 2017-08-11 PROCEDURE — G0283 ELEC STIM OTHER THAN WOUND: HCPCS | Performed by: PHYSICAL THERAPIST

## 2017-08-11 PROCEDURE — 97140 MANUAL THERAPY 1/> REGIONS: CPT | Performed by: PHYSICAL THERAPIST

## 2017-08-11 NOTE — PROGRESS NOTES
Physical Therapy Daily Progress Note      Leroy ALCAZAR Debra reports: his arm is feeling better. Reports mild soreness after last visit.      Objective   See Exercise, Manual, and Modality Logs for complete treatment.     Assessment/Plan  Pt demonstrates improvement in left GH PROM, near normal in ER, remains moderately limited with flexion and abduction. Pt performed well with all progressed strengthening, reported mild soreness. Pt would benefit from additional skilled PT to continue to left GH ROM and strength.    Progress per Plan of Care    Manual PT [id number] 10 minutes and Therapy Exercise [id number] 30 minutes    Timed Code Treatment: 40   Minutes     Total Treatment Time: 57     Minutes    Lashell Parry, PT, DPT  Physical Therapist #[id number]

## 2017-08-16 ENCOUNTER — TREATMENT (OUTPATIENT)
Dept: PHYSICAL THERAPY | Facility: CLINIC | Age: 72
End: 2017-08-16

## 2017-08-16 DIAGNOSIS — M25.512 CHRONIC LEFT SHOULDER PAIN: ICD-10-CM

## 2017-08-16 DIAGNOSIS — S42.295D OTHER CLOSED NONDISPLACED FRACTURE OF PROXIMAL END OF LEFT HUMERUS WITH ROUTINE HEALING, SUBSEQUENT ENCOUNTER: Primary | ICD-10-CM

## 2017-08-16 DIAGNOSIS — G89.29 CHRONIC LEFT SHOULDER PAIN: ICD-10-CM

## 2017-08-16 PROCEDURE — G0283 ELEC STIM OTHER THAN WOUND: HCPCS | Performed by: PHYSICAL THERAPIST

## 2017-08-16 PROCEDURE — 97140 MANUAL THERAPY 1/> REGIONS: CPT | Performed by: PHYSICAL THERAPIST

## 2017-08-16 PROCEDURE — 97110 THERAPEUTIC EXERCISES: CPT | Performed by: PHYSICAL THERAPIST

## 2017-08-16 NOTE — PROGRESS NOTES
Physical Therapy Daily Progress Note      Leroy Richardson reports: the arm is still sore when he tries to use it. Pt is asking when he will finally be all better.      Objective   See Exercise, Manual, and Modality Logs for complete treatment.     Assessment/Plan  Pt demonstrates excellent PROM in all directions, near normal. Initiated progressed low level strengthening to improve GH AROM, which pt performed with no increased pain. Pt would benefit from additional skilled PT to continue to progress left GH ROM and strength to tolerance.    Progress per Plan of Care    Manual PT [id number] 10 minutes and Therapy Exercise [id number] 15 minutes    Timed Code Treatment: 25   Minutes     Total Treatment Time: 60      Minutes    Lashell Parry, PT, DPT  Physical Therapist #[id number]

## 2017-08-18 ENCOUNTER — TREATMENT (OUTPATIENT)
Dept: PHYSICAL THERAPY | Facility: CLINIC | Age: 72
End: 2017-08-18

## 2017-08-18 DIAGNOSIS — S42.295D OTHER CLOSED NONDISPLACED FRACTURE OF PROXIMAL END OF LEFT HUMERUS WITH ROUTINE HEALING, SUBSEQUENT ENCOUNTER: Primary | ICD-10-CM

## 2017-08-18 DIAGNOSIS — G89.29 CHRONIC LEFT SHOULDER PAIN: ICD-10-CM

## 2017-08-18 DIAGNOSIS — M25.512 CHRONIC LEFT SHOULDER PAIN: ICD-10-CM

## 2017-08-18 PROCEDURE — 97140 MANUAL THERAPY 1/> REGIONS: CPT | Performed by: PHYSICAL THERAPIST

## 2017-08-18 PROCEDURE — G0283 ELEC STIM OTHER THAN WOUND: HCPCS | Performed by: PHYSICAL THERAPIST

## 2017-08-18 PROCEDURE — 97110 THERAPEUTIC EXERCISES: CPT | Performed by: PHYSICAL THERAPIST

## 2017-08-18 NOTE — PROGRESS NOTES
Physical Therapy Daily Progress Note      Leroy ALCAZAR Debra reports: the arm is still sore when he moves it.      Objective   See Exercise, Manual, and Modality Logs for complete treatment.     Assessment/Plan  Pt demonstrates excellent PROM. Instructed pt in progressed HEP, which pt performed with no increased pain.  Pt would benefit from additional skilled PT to continue to progress left UE ROM and strength.     Progress per Plan of Care    Manual PT [id number] 10 minutes and Therapy Exercise [id number] 15 minutes    Timed Code Treatment: 25   Minutes     Total Treatment Time: 55      Minutes    Lashell Parry, PT, DPT  Physical Therapist #[id number]

## 2017-08-22 ENCOUNTER — TREATMENT (OUTPATIENT)
Dept: PHYSICAL THERAPY | Facility: CLINIC | Age: 72
End: 2017-08-22

## 2017-08-22 DIAGNOSIS — S42.295D OTHER CLOSED NONDISPLACED FRACTURE OF PROXIMAL END OF LEFT HUMERUS WITH ROUTINE HEALING, SUBSEQUENT ENCOUNTER: Primary | ICD-10-CM

## 2017-08-22 DIAGNOSIS — M25.512 CHRONIC LEFT SHOULDER PAIN: ICD-10-CM

## 2017-08-22 DIAGNOSIS — G89.29 CHRONIC LEFT SHOULDER PAIN: ICD-10-CM

## 2017-08-22 PROCEDURE — G0283 ELEC STIM OTHER THAN WOUND: HCPCS | Performed by: PHYSICAL THERAPIST

## 2017-08-22 PROCEDURE — 97110 THERAPEUTIC EXERCISES: CPT | Performed by: PHYSICAL THERAPIST

## 2017-08-22 PROCEDURE — 97140 MANUAL THERAPY 1/> REGIONS: CPT | Performed by: PHYSICAL THERAPIST

## 2017-08-22 NOTE — PROGRESS NOTES
Physical Therapy Daily Progress Note      Leroy ALCAZAR Debra reports: his shoulder is still sore at times. Reports he feels like it is getting a little better.        Objective   See Exercise, Manual, and Modality Logs for complete treatment.     Assessment/Plan  Instructed pt in GH AAROM and elbow AROM HEP only, which pt performed with minimal increased pain.  Pt demonstrates excellent PROM in all directions, however continues to complain of soreness with AROM.  Pt would benefit from additional skilled PT to continue to progress left  ROM.    Progress per Plan of Care    Manual PT [id number] 10 minutes and Therapy Exercise 18532 20 minutes    Timed Code Treatment: 30   Minutes     Total Treatment Time: 50      Minutes    Lashell Parry, PT, DPT  Physical Therapist #[id number]

## 2017-08-25 ENCOUNTER — TREATMENT (OUTPATIENT)
Dept: PHYSICAL THERAPY | Facility: CLINIC | Age: 72
End: 2017-08-25

## 2017-08-25 DIAGNOSIS — G89.29 CHRONIC LEFT SHOULDER PAIN: ICD-10-CM

## 2017-08-25 DIAGNOSIS — S42.295D OTHER CLOSED NONDISPLACED FRACTURE OF PROXIMAL END OF LEFT HUMERUS WITH ROUTINE HEALING, SUBSEQUENT ENCOUNTER: Primary | ICD-10-CM

## 2017-08-25 DIAGNOSIS — M25.512 CHRONIC LEFT SHOULDER PAIN: ICD-10-CM

## 2017-08-25 PROCEDURE — 97110 THERAPEUTIC EXERCISES: CPT | Performed by: PHYSICAL THERAPIST

## 2017-08-25 NOTE — PROGRESS NOTES
Re-Assessment   Patient: Leroy Richardson   : 1945  Diagnosis/ICD-10 Code:  Other closed nondisplaced fracture of proximal end of left humerus with routine healing, subsequent encounter [S42.295D]  Referring practitioner: Jarrett Hung MD  Date of Initial Visit: 2017  Today's Date: 2017  Patient seen for 8 sessions    Subjective:   Leroy Richardson reports: his shoulder is still sore with active movement. Reports last night he was trying to open a jar using the left arm and had pain.   Subjective Questionnaire: QuickDASH: 48%-improved from 61%  Clinical Progress: improved  Home Program Compliance: Yes  Treatment has included: therapeutic exercise, manual therapy, electrical stimulation and cryotherapy    Subjective   Objective     Observations     Additional Observation Details  Increased rounded shoulder bilaterally  Increased scapular elevation with UE elevation    Active Range of Motion   Left Shoulder   Flexion: 112 degrees with pain  Abduction: 75 degrees with pain  External rotation 45°: 70 degrees with pain  Internal rotation BTB: L4     Right Shoulder   Internal rotation BTB: T8     Passive Range of Motion   Left Shoulder   Flexion: 150 (AA) degrees   Abduction: 130 (AA) degrees   External rotation 45°: 75 degrees     Strength/Myotome Testing     Left Shoulder     Planes of Motion   Flexion: 2   Abduction: 2   External rotation at 0°: 4+   Internal rotation at 0°: 4+     Isolated Muscles   Biceps: 4+      Assessment/Plan   Pt demonstrates near symmetrical GH PROM/AAROM.  Pt remains limited in left GH active ROM, however is improved, AROM is likely limited due to weakness.  Discussed with pt healing time frames, shoulder mechanics and possible reasons for continued soreness.  Pt would benefit from additional skilled PT to continue to progress GH ROM.   Progress toward previous goals: Partially Met    New Goals  Patient goal: 2 weeks. Pt will:  1. Be independent with initial HEP  2.  Report pain </= 5/10 with all ADL's  3. Demonstrate improved left GH flexion AROM >/= 115  4. Demonstrate improved left GH abduction AROM >/= 90    4 weeks. Pt will:  1. Report pain of </= 1/10 with all ADLs  2. Demonstrate improved left GH flexion, abduction, ER MMT of >/= 4+/5  3. Demonstrate improved left GH AROM flexion of >/= 135  4. Return to tucking in shirt/putting on a belt with the left arm with no increased pain  5. QuickDASH </= 40%, corresponding with        Recommendations: Continue as planned-after follow up with MD. Continue AAROM HEP   Timeframe: 1 month  Prognosis to achieve goals: good    Therapy Exercise [id number] 15 minutes    Timed Code Treatment: 15   Minutes     Total Treatment Time: 45      Minutes    PT Signature: Lashell Parry, PT, DPT  [place] License # [id number]    Based upon review of the patient's progress and continued therapy plan, it is my medical opinion that Leroy Richardson should continue physical therapy treatment at [facility]  [address] [place] [id number] [phone].    Signature: __________________________________  Jarrett Hung MD

## 2017-09-06 ENCOUNTER — OFFICE VISIT (OUTPATIENT)
Dept: ORTHOPEDIC SURGERY | Facility: CLINIC | Age: 72
End: 2017-09-06

## 2017-09-06 VITALS — HEIGHT: 71 IN | TEMPERATURE: 98.7 F | WEIGHT: 159 LBS | BODY MASS INDEX: 22.26 KG/M2

## 2017-09-06 DIAGNOSIS — Z09 FRACTURE FOLLOW-UP: ICD-10-CM

## 2017-09-06 DIAGNOSIS — S42.202D CLOSED FRACTURE OF PROXIMAL END OF LEFT HUMERUS WITH ROUTINE HEALING, UNSPECIFIED FRACTURE MORPHOLOGY, SUBSEQUENT ENCOUNTER: Primary | ICD-10-CM

## 2017-09-06 PROCEDURE — 73030 X-RAY EXAM OF SHOULDER: CPT | Performed by: ORTHOPAEDIC SURGERY

## 2017-09-06 PROCEDURE — 99024 POSTOP FOLLOW-UP VISIT: CPT | Performed by: ORTHOPAEDIC SURGERY

## 2017-09-06 NOTE — PROGRESS NOTES
Leroy Richardson : 1945 MRN: [id number] DATE: 2017      CC:  2.5 months s/p closed treatment left proximal humerus fracture    HPI: Pt. returns to clinic today stating pain is improved.  Motion is progressing.  Denies any new concerns or issues.  PT is helping.    Vitals:    17 0900   Temp: 98.7 °F (37.1 °C)         Current Outpatient Prescriptions:   •  aspirin (ASPIRIN ADULT LOW STRENGTH) 81 MG EC tablet, Take  by mouth daily., Disp: , Rfl:   •  clonazePAM (KlonoPIN) 0.5 MG tablet, Take 0.5 mg by mouth 2 (Two) Times a Day As Needed for Seizures., Disp: , Rfl:   •  finasteride (PROSCAR) 5 MG tablet, Take 5 mg by mouth daily., Disp: , Rfl:   •  nebivolol (BYSTOLIC) 5 MG tablet, Take 1 tablet by mouth Daily., Disp: 90 tablet, Rfl: 3  •  Omega-3 Fatty Acids (FISH OIL) 1200 MG capsule capsule, Take  by mouth., Disp: , Rfl:   •  sertraline (ZOLOFT) 100 MG tablet, Take 100 mg by mouth Daily., Disp: , Rfl:     Past Medical History:   Diagnosis Date   • Aortic insufficiency     Mild   • Heart murmur    • Lymphoma, non-Hodgkin's    • Polyarthralgia 1997       Past Surgical History:   Procedure Laterality Date   • AXILLARY LYMPH NODE BIOPSY/EXCISION Left 1996   • HEMORRHOIDECTOMY  ,    • PROSTATE BIOPSY  2007       Family History   Problem Relation Age of Onset   • Heart failure Mother    • Heart failure Father    • Lymphoma Maternal Aunt        Social History     Social History   • Marital status:      Spouse name: N/A   • Number of children: 1   • Years of education: N/A     Occupational History   •       Social History Main Topics   • Smoking status: Never Smoker   • Smokeless tobacco: Not on file   • Alcohol use Yes      Comment: Occasional beer   • Drug use: Not on file   • Sexual activity: Defer     Other Topics Concern   • Not on file     Social History Narrative     Exam:   Contour of shoulder appears normal.  Skin intact and benign.  Arm and forearm soft.   Motion is better but not quite full.  , ER 40, IR to L2.  Good motor and sensory function distally.  Palpable radial pulse with good cap refill.      Imaging   2v xrays including AP and scapular Y views are ordered and reviewed by me to evaluate alignment and for comparison purposes.  No new or concerning findings noted. There has been interval callous formation.    Impression:  2.5 months s/p closed treatment left proximal humerus fracture    Plan:    1.  Progress ROM and strengthening as tolerated.  2.  Continue PT.  3.  No restrictions at this point.  4.  Follow up as needed.

## 2017-09-07 ENCOUNTER — TREATMENT (OUTPATIENT)
Dept: PHYSICAL THERAPY | Facility: CLINIC | Age: 72
End: 2017-09-07

## 2017-09-07 DIAGNOSIS — M25.512 CHRONIC LEFT SHOULDER PAIN: ICD-10-CM

## 2017-09-07 DIAGNOSIS — S42.295D OTHER CLOSED NONDISPLACED FRACTURE OF PROXIMAL END OF LEFT HUMERUS WITH ROUTINE HEALING, SUBSEQUENT ENCOUNTER: Primary | ICD-10-CM

## 2017-09-07 DIAGNOSIS — G89.29 CHRONIC LEFT SHOULDER PAIN: ICD-10-CM

## 2017-09-07 PROCEDURE — 97110 THERAPEUTIC EXERCISES: CPT | Performed by: PHYSICAL THERAPIST

## 2017-09-07 NOTE — PROGRESS NOTES
Physical Therapy Daily Progress Note      Leroy Richardson reports: saw MD- said the shoulder is healing well, about 50% healed. Said to continue PT. Said the soreness is feeling a lot better.       Objective   See Exercise, Manual, and Modality Logs for complete treatment.     Assessment/Plan  Pt performed all progressed strengthening with no increased pain. Instructed pt in progressed strengthening HEP and discussed normal GH and scapular mechanics with elevation. Pt would benefit from additional skilled PT to continue to decrease pain.    Progress per Plan of Care    Therapy Exercise [id number] 30 minutes    Timed Code Treatment: 30   Minutes     Total Treatment Time: 45      Minutes    Lashell Parry, PT, DPT  Physical Therapist #[id number]

## 2017-09-13 ENCOUNTER — TREATMENT (OUTPATIENT)
Dept: PHYSICAL THERAPY | Facility: CLINIC | Age: 72
End: 2017-09-13

## 2017-09-13 DIAGNOSIS — G89.29 CHRONIC LEFT SHOULDER PAIN: ICD-10-CM

## 2017-09-13 DIAGNOSIS — S42.295D OTHER CLOSED NONDISPLACED FRACTURE OF PROXIMAL END OF LEFT HUMERUS WITH ROUTINE HEALING, SUBSEQUENT ENCOUNTER: Primary | ICD-10-CM

## 2017-09-13 DIAGNOSIS — M25.512 CHRONIC LEFT SHOULDER PAIN: ICD-10-CM

## 2017-09-13 PROCEDURE — 97110 THERAPEUTIC EXERCISES: CPT | Performed by: PHYSICAL THERAPIST

## 2017-09-13 NOTE — PROGRESS NOTES
Physical Therapy Daily Progress Note      Leroy Richardson reports: the shoulder is feeling a little better. Still has pain with reaching behind the back.   Pain scale: 0     Objective   See Exercise, Manual, and Modality Logs for complete treatment.     Assessment/Plan  Pt performed all progressed strengthening with no increased pain.  Instructed pt in pain free progressed GH strengthening.  Pt would benefit from additional skilled PT to continue to progress left GH ROM and strength.    Progress strengthening /stabilization /functional activity    Therapy Exercise [id number] 30 minutes    Timed Code Treatment: 30   Minutes     Total Treatment Time: 50      Minutes    Lashell Parry, PT, DPT  Physical Therapist #[id number]

## 2017-09-20 ENCOUNTER — TREATMENT (OUTPATIENT)
Dept: PHYSICAL THERAPY | Facility: CLINIC | Age: 72
End: 2017-09-20

## 2017-09-20 DIAGNOSIS — S42.295D OTHER CLOSED NONDISPLACED FRACTURE OF PROXIMAL END OF LEFT HUMERUS WITH ROUTINE HEALING, SUBSEQUENT ENCOUNTER: Primary | ICD-10-CM

## 2017-09-20 DIAGNOSIS — M25.512 CHRONIC LEFT SHOULDER PAIN: ICD-10-CM

## 2017-09-20 DIAGNOSIS — G89.29 CHRONIC LEFT SHOULDER PAIN: ICD-10-CM

## 2017-09-20 PROCEDURE — 97110 THERAPEUTIC EXERCISES: CPT | Performed by: PHYSICAL THERAPIST

## 2017-09-20 NOTE — PROGRESS NOTES
Physical Therapy Daily Progress Note      Leroy Richardson reports: the shoulder is doing better, minimal soreness and reports compliance with HEP.      Objective     Active Range of Motion   Left Shoulder   Flexion: 123 degrees      See Exercise, Manual, and Modality Logs for complete treatment.     Assessment/Plan  Pt demonstrates significant improvement in GH elevation.  Reviewed long term mobility and strengthening HEP. Anticipate pt will continue to progress in ROM with continued strengthening.  Answered all questions, instructed pt to follow up within 30 days if needed.   Other-d/c to HEP    Therapy Exercise [id number] 38 minutes    Timed Code Treatment: 30   Minutes     Total Treatment Time: 55      Minutes    Lashell Parry, PT, DPT  Physical Therapist #[id number]

## 2017-11-16 ENCOUNTER — OFFICE VISIT (OUTPATIENT)
Dept: ONCOLOGY | Facility: CLINIC | Age: 72
End: 2017-11-16

## 2017-11-16 ENCOUNTER — LAB (OUTPATIENT)
Dept: LAB | Facility: HOSPITAL | Age: 72
End: 2017-11-16

## 2017-11-16 VITALS
SYSTOLIC BLOOD PRESSURE: 128 MMHG | HEART RATE: 63 BPM | DIASTOLIC BLOOD PRESSURE: 72 MMHG | TEMPERATURE: 97.9 F | BODY MASS INDEX: 21.22 KG/M2 | OXYGEN SATURATION: 97 % | RESPIRATION RATE: 16 BRPM | HEIGHT: 71 IN | WEIGHT: 151.6 LBS

## 2017-11-16 DIAGNOSIS — Z85.79: Primary | ICD-10-CM

## 2017-11-16 DIAGNOSIS — R94.6 ABNORMAL RESULTS OF THYROID FUNCTION STUDIES: ICD-10-CM

## 2017-11-16 DIAGNOSIS — R94.31 ST SEGMENT DEPRESSION: ICD-10-CM

## 2017-11-16 LAB
ALBUMIN SERPL-MCNC: 3.9 G/DL (ref 3.5–5.2)
ALBUMIN/GLOB SERPL: 1.1 G/DL (ref 1.1–2.4)
ALP SERPL-CCNC: 70 U/L (ref 38–116)
ALT SERPL W P-5'-P-CCNC: 28 U/L (ref 0–41)
ANION GAP SERPL CALCULATED.3IONS-SCNC: 11.2 MMOL/L
AST SERPL-CCNC: 23 U/L (ref 0–40)
BASOPHILS # BLD AUTO: 0.04 10*3/MM3 (ref 0–0.1)
BASOPHILS NFR BLD AUTO: 0.3 % (ref 0–1.1)
BILIRUB SERPL-MCNC: 0.4 MG/DL (ref 0.1–1.2)
BUN BLD-MCNC: 16 MG/DL (ref 6–20)
BUN/CREAT SERPL: 18.2 (ref 7.3–30)
CALCIUM SPEC-SCNC: 9.5 MG/DL (ref 8.5–10.2)
CHLORIDE SERPL-SCNC: 96 MMOL/L (ref 98–107)
CO2 SERPL-SCNC: 25.8 MMOL/L (ref 22–29)
CREAT BLD-MCNC: 0.88 MG/DL (ref 0.7–1.3)
CRP SERPL-MCNC: 0.41 MG/DL (ref 0–0.5)
DEPRECATED RDW RBC AUTO: 41.3 FL (ref 37–49)
EOSINOPHIL # BLD AUTO: 0.09 10*3/MM3 (ref 0–0.36)
EOSINOPHIL NFR BLD AUTO: 0.7 % (ref 1–5)
ERYTHROCYTE [DISTWIDTH] IN BLOOD BY AUTOMATED COUNT: 13.3 % (ref 11.7–14.5)
GFR SERPL CREATININE-BSD FRML MDRD: 86 ML/MIN/1.73
GLOBULIN UR ELPH-MCNC: 3.4 GM/DL (ref 1.8–3.5)
GLUCOSE BLD-MCNC: 87 MG/DL (ref 74–124)
HCT VFR BLD AUTO: 39.6 % (ref 40–49)
HGB BLD-MCNC: 13.7 G/DL (ref 13.5–16.5)
HOLD SPECIMEN: NORMAL
IMM GRANULOCYTES # BLD: 0.38 10*3/MM3 (ref 0–0.03)
IMM GRANULOCYTES NFR BLD: 3.1 % (ref 0–0.5)
LYMPHOCYTES # BLD AUTO: 1.43 10*3/MM3 (ref 1–3.5)
LYMPHOCYTES NFR BLD AUTO: 11.6 % (ref 20–49)
MCH RBC QN AUTO: 29.7 PG (ref 27–33)
MCHC RBC AUTO-ENTMCNC: 34.6 G/DL (ref 32–35)
MCV RBC AUTO: 85.9 FL (ref 83–97)
MONOCYTES # BLD AUTO: 0.63 10*3/MM3 (ref 0.25–0.8)
MONOCYTES NFR BLD AUTO: 5.1 % (ref 4–12)
NEUTROPHILS # BLD AUTO: 9.79 10*3/MM3 (ref 1.5–7)
NEUTROPHILS NFR BLD AUTO: 79.2 % (ref 39–75)
NRBC BLD MANUAL-RTO: 0 /100 WBC (ref 0–0)
PLATELET # BLD AUTO: 416 10*3/MM3 (ref 150–375)
PMV BLD AUTO: 7.7 FL (ref 8.9–12.1)
POTASSIUM BLD-SCNC: 4.4 MMOL/L (ref 3.5–4.7)
PROT SERPL-MCNC: 7.3 G/DL (ref 6.3–8)
RBC # BLD AUTO: 4.61 10*6/MM3 (ref 4.3–5.5)
SODIUM BLD-SCNC: 133 MMOL/L (ref 134–145)
TSH SERPL DL<=0.05 MIU/L-ACNC: 2.62 MIU/ML (ref 0.27–4.2)
VIT B12 BLD-MCNC: 834 PG/ML (ref 211–946)
WBC NRBC COR # BLD: 12.36 10*3/MM3 (ref 4–10)

## 2017-11-16 PROCEDURE — 36415 COLL VENOUS BLD VENIPUNCTURE: CPT | Performed by: INTERNAL MEDICINE

## 2017-11-16 PROCEDURE — 84443 ASSAY THYROID STIM HORMONE: CPT | Performed by: INTERNAL MEDICINE

## 2017-11-16 PROCEDURE — 86140 C-REACTIVE PROTEIN: CPT | Performed by: INTERNAL MEDICINE

## 2017-11-16 PROCEDURE — 99214 OFFICE O/P EST MOD 30 MIN: CPT | Performed by: INTERNAL MEDICINE

## 2017-11-16 PROCEDURE — 80053 COMPREHEN METABOLIC PANEL: CPT

## 2017-11-16 PROCEDURE — 85025 COMPLETE CBC W/AUTO DIFF WBC: CPT

## 2017-11-16 PROCEDURE — 82607 VITAMIN B-12: CPT | Performed by: INTERNAL MEDICINE

## 2017-11-16 RX ORDER — CYANOCOBALAMIN (VITAMIN B-12) 500 MCG
TABLET ORAL
COMMUNITY

## 2017-11-16 NOTE — PROGRESS NOTES
REASONS FOR FOLLOWUP:     1.;    Remote history of non-Hodgkin lymphoma, in remission for more than 18 years. He received Rituxan at the time of his original diagnosis. No recurrence since then. As per 10/08/2015, the patient remains asymptomatic in regard to lymphoma with no fevers, chi  lls, night sweats, pruritus, changes in weight, performance status, peripheral adenopathy or rashes in the skin. Normal clinical examination. The patient will remain in observation.       HISTORY OF PRESENT ILLNESS:  Mr. Richardson returns today to the office for followup in the company of his wife.Today he has a multitude of complaints including the fact that he moved from his old house to an apartment almost 10 months ago and since then he became so depressed that he landed in the [facility] psych veliz for many days requiring to take now antidepression medication. He does not feel any better. Subsequently playing golf he had an accidental fall and he fractured his left humerus. He was in a sling for almost 2 months. Subsequently a few weeks ago his granddaughter came from [place] to visit and she was sick and the patient landed with a respiratory infection and pneumonia and he had fever until 4 days ago with associated cough and purulent sputum production. Doxycycline given to him by his primary physician with some improvement. Therefore in the middle of all of this he is not happy in his apartment, he has no contact with anybody, he has not been able to play golf and he has not socialized, neither worked as a volunteer in any other event. He is completely isolated and he is tired of living. He has no suicidal ideation. He has been seen by his psychiatrist 3 days ago and his medications for depression have been modified and for his anxiety as well. Again he is no longer taking antibiotics. He has lost copious amount of weight, his appetite is marginal and he has loose bowel movements since the initiation of the  antibiotic with no abdominal cramping. Urination is ongoing with no difficulties. New prostate check has been done and his PSA remains appropriate. He has no new cardiovascular issues. No new neurological issues.    He denies any fevers, chills or night sweats at this time. No adenopathy and no rashes in the skin.               PAST MEDICAL HISTORY:  The patient has had the common childhood illnesses and hemorrhoidectomy in 1976 and 1977.  In December of 1996 he had left axillary lymphadenopathy completely excise (4 cm).   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  No therapy was re  commended as she was LESLIE on careful staging workup. This included a negative bone marrow. Chest x-ray in 1998 showed a granuloma in the left upper lobe with calcified nodes in the left hilum.  He is PPD negative by history.          In June of 1997, he was admitted to [facility] by Dr. Thomason and seen in consultation by Dr. Tiwari with acute polyarthralgia and he was briefly treated with steroids.          In August of 1999, he had evidence of recurrent disease with mediastinal, axillary and mesenteric lymphadenopath  y.  Right axillary lymph node biopsy by Dr. Fernandez ([facility] #[id number]) demonstrated PDL nodular lymphoma. The flow cytometry confirmed the fact that he was CD20 positive.          A trial of Leukeran was initiated in August of 1999 without response documented in October.  Therapy with Rituxan initiated on 10/14/99 (LYM-5).  Subsequent scans have shown slow, steady response to Rituxan.     Most recent scans were in January 2001 showing further slight interval decrease in size of the lymph nodes.         Scans in July of 2002 showed no evidence of lymphadenopathy.  Repeat scans in July of 2003 are LESLIE.        Surveillance scans  in 2004 and again in 2005 showed no evidence of disease.        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  PSA elevation  requiring probably reevaluation in  by Dr. Jefry Carballo.          Dr. Carballo performed prostate biopsy in 2007 with no evidence o  f malignancy.  Also in  Mr. Richardson underwent carotid Doppler scans showing no significant carotid stenosis and an echocardiogram showing some calcification of the aortic valve with mild aortic insufficiency.  He continues to followup with [facility].        MRI of brain in 2009 failed to document any major abnormalities.          CT scans of chest, abdomen, and pelvis failed to document any progression of his lymphoma as per 2009.           On 13 the patient states that he has been seen by Dr. Cox, his cardiologist, in regard his heart murmur and this has not changed overall and no changes to medications was advised.          He has been seen by his urologist and because he has a minor rise in his PSA to 5.1 the patient was advised to hav  e a new prostate biopsy and part of the material will be sent for new testing including DNA analysis that would include or exclude once and for all, being the third biopsy on his prostate, that he has or does not have prostate cancer.         HEMATOLOGIC/ONCOLOGIC HISTORY:  History from previous dates can be found in the separate document.         MEDICATIONS:  The current medication list was reviewed with the patient and updated in the EMR this date per the Medical Assistant.  Medication dosages and frequencies were confirmed to be accurate.        SOCIAL HISTORY: He is  and has a daughter. He is an .  He does not smoke or drink, an occasional beer.         FAMILY HISTORY: His father  at the age of 83, mother at the age of 72; both of heart failure.  He has two brothers and a  sister. An aunt was treated for lymphoma.         REVIEW OF SYSTEMS:   General: STATED fever, chills, fatigue, weight changes, or lack of appetite.  Eyes: no epiphora, xerophthalmia,conjunctivitis, pain, glaucoma, blurred vision, blindness, secretion, photophobia, proptosis, diplopia.  Ears: no otorrhea, tinnitus, otorrhagia, deafness, pain, vertigo.  Nose: no rhinorrhea, epistaxis, alteration in perception of odors, sinuses pressure.  Mouth: no alteration in gums or teeth,  ulcers, no difficulty with mastication or deglut ion, no odynophagia.  Neck: no masses or pain, no thyroid alterations, no pain in muscles or arteries, no carotid odynia, no crepitation.  Respiratory: nSTATED cough, sputum production, NO dyspnea, trepopnea, pleuritic pain, hemoptysis.  Heart: no syncope, irregularity, palpitations, angina, orthopnea, paroxysmal nocturnal dyspnea.  Vascular Venous: no tenderness,edema, palpable cords, postphlebitic syndrome, skin changes or ulcerations.  Vascular Arterial: no distal ischemia, claudication, gangrene, neuropathic ischemic pain, skin ulcers, paleness or cyanosis.  GI: no dysphagia, odynophagia, no regurgitation, no heartburn,no indigestion,no nausea,no vomiting,no hematemesis ,no melena,no jaundice,no distention, no obstipation,no enterorrhagia,no proctalgia,no anal  lesions, STATED changes in bowel habits.  : no frequency, hesitancy, hematuria, discharge, pain.  Musculoskeletal: no muscle or tendon pain or inflammation, joint pain, edema, LEFT SHOULDER AFTER FXfunctional limitation, NO fasciculations, mass.  Neurologic: no headache, seizures, alterations on Craneal nerves, no motor or senssory deficit, normal coordination, no alteration in memory, orientation, calculation,writting, verbal or written language.  Skin: no rashes, pruritus or localized lesions.  Psychiatric: SEVERE anxiety,AND depression,NO agitation, delusions, proper insight.  NO SUICIDAL IDEATION       VITAL SIGNS:  Vitals:     "17 0909   BP: 128/72   Pulse: 63   Resp: 16   Temp: 97.9 °F (36.6 °C)   TempSrc: Oral   SpO2: 97%   Weight: 151 lb 9.6 oz (68.8 kg)   Height: 71\" (180.3 cm)          PAIN:  0 out of 10      ECO         PHYSICAL EXAMINATION:   GENERAL:  Well-developed, well-nourished  Patient  in no acute distress.   SKIN:  Warm, dry without rashes, purpura or petechiae.  HEENT:  Pupils were equal and reactive to light and accomodation, conjunctivas non injected, no pterigion, normal extraocular movements, normal visual acuity r eye, minimal va left eye   Mouth mucosa was moist, no exudates in oropharynx, normal gum line, normal roof of the mouth and pillars, normal papillations of the tongue.Ear canals were normal, as well tympanic membranes, normal hearing acuity.No pain in mastoid area or erythema.  NECK:  Supple with good range of motion; no thyromegaly or masses, no JVD or bruits, no cervical adenopathies.No carotid arteries pain, no carotid abnormal pulsation or arterial dance.  LYMPHATICS:  No cervical, supraclavicular, axillary, epitrochlear or inguinal adenopathy.  CHEST:  Normal excursion of both holland thoraces, normal voice fremitus, no subcutaneous emphysema, normal axillas, no rashes or acanthosis nigricans. Lungs clear to percussion and auscultation, normal breath sounds bilaterally, no wheezing, crackles or ronchi, no stridor, no rubs.  CARDIAC AND VASCULAR: PMI not displaced,no thrills, normal rate and regular rhythm, with systolic basal g2/6 murmur, rubs or S3 or S4 right or left sided gallops. Normal femoral, popliteal, pedis, brachial and carotid pulses.  ABDOMEN:  Soft, nontender with no organomegaly or masses, no ascites, no collateral circulation,no distention,no Chay sign, no abdominal pain, no inguinal hernias,no umbilical hernias, no abdominal bruits. Normal bowel sounds.  GENITAL: Not  Performed.  EXTREMITIES  AND SPINE:  No clubbing, cyanosis or edema, no deformities or pain .No kyphosis, " scoliosis, deformities or pain in spine, ribs or pelvic bone.  NEUROLOGICAL:  Patient was awake, alert, oriented to time, person and place,normal gait and coordination,  LABORATORY DATA:   Auto Differential   Order: [id number] - Part of Panel Order [id number]   Status:  Final result   Visible to patient:  No (Not Released) Dx:  Hx of nodular lymphoma; ST segment de...      Ref Range & Units 8:56 AM     WBC 4.00 - 10.00 10*3/mm3 12.36 (H)   RBC 4.30 - 5.50 10*6/mm3 4.61   Hemoglobin 13.5 - 16.5 g/dL 13.7   Hematocrit 40.0 - 49.0 % 39.6 (L)   MCV 83.0 - 97.0 fL 85.9   MCH 27.0 - 33.0 pg 29.7   MCHC 32.0 - 35.0 g/dL 34.6   RDW 11.7 - 14.5 % 13.3   RDW-SD 37.0 - 49.0 fl 41.3   MPV 8.9 - 12.1 fL 7.7 (L)   Platelets 150 - 375 10*3/mm3 416 (H)   Neutrophil % 39.0 - 75.0 % 79.2 (H)   Lymphocyte % 20.0 - 49.0 % 11.6 (L)   Monocyte % 4.0 - 12.0 % 5.1   Eosinophil % 1.0 - 5.0 % 0.7 (L)   Basophil % 0.0 - 1.1 % 0.3   Immature Grans % 0.0 - 0.5 % 3.1 (H)   Neutrophils, Absolute 1.50 - 7.00 10*3/mm3 9.79 (H)   Lymphocytes, Absolute 1.00 - 3.50 10*3/mm3 1.43   Monocytes, Absolute 0.25 - 0.80 10*3/mm3 0.63   Eosinophils, Absolute 0.00 - 0.36 10*3/mm3 0.09                   ASSESSMENT/PLAN: This patient has history of non-Hodgkin ly  mphoma that occurred close to 20 years ago. He was treated with Rituxan. He never had a recurrence. 1. On clinical grounds the patient has nothing to suggest recurrence of his lymphoma.    2. The patient has leukocytosis and thrombocytosis. He completed antibiotic therapy 2 days ago for a respiratory infection that sounds like pneumonia. His pulmonary auscultation today and his oxygenation on room air is normal and obviously this is part of probably the resolutive process associated with this. This will need to be monitored and we need to repeat laboratory parameters in 3-4 weeks and I asked him to return for that purpose.   3. The patient has immersed himself in a major depression. He was hospitalized at  the [facility] not too long ago. He is under their care in the psychiatric service. Medications have been adjusted a few days ago to try to control this but he still feels gloomy, he has no purpose in life, stressors become an issue and he just is tremendously anxious. He has no suicidal ideation. His psychiatric medication has been modified raising his sertraline and decreasing his antianxiety medicine. The patient does not feel any better and I pointed out to him that now that he has modified his medicine he just needs to give himself at least 2-3 weeks worth of observation and see if these medicines will have a positive impact on his disease process. If not maybe he will require to have adjustment or modification in the regimen altogether.   4. The patient has had a respiratory infection now improving. He has no symptoms related to this at this point but he has some leftover diarrhea probably a consequence of doxycycline. I doubt that he has Clostridium. He has no fever but he has leukocytosis. I pointed out to him to use a probiotic at least for a week starting today. His wife will take care of that.     I do not want to wait for a year for him to come back. I made him an appointment for him to come back and see me in 3 weeks, repeat laboratory parameters and see how he is doing.     I sent him back to the lab and we will check a TSH, a B12 level, a C-reactive protein and we will monitor his complete metabolic profile.     I did not give any medicines neither did I schedule him for any radiologic assessment.

## 2017-12-11 ENCOUNTER — OFFICE VISIT (OUTPATIENT)
Dept: ONCOLOGY | Facility: CLINIC | Age: 72
End: 2017-12-11

## 2017-12-11 ENCOUNTER — LAB (OUTPATIENT)
Dept: LAB | Facility: HOSPITAL | Age: 72
End: 2017-12-11

## 2017-12-11 VITALS
DIASTOLIC BLOOD PRESSURE: 80 MMHG | TEMPERATURE: 98.3 F | SYSTOLIC BLOOD PRESSURE: 132 MMHG | HEIGHT: 71 IN | WEIGHT: 151.2 LBS | HEART RATE: 64 BPM | RESPIRATION RATE: 16 BRPM | BODY MASS INDEX: 21.17 KG/M2

## 2017-12-11 DIAGNOSIS — Z85.79: ICD-10-CM

## 2017-12-11 DIAGNOSIS — Z85.79: Primary | ICD-10-CM

## 2017-12-11 LAB
ALBUMIN SERPL-MCNC: 4.1 G/DL (ref 3.5–5.2)
ALBUMIN/GLOB SERPL: 1.4 G/DL (ref 1.1–2.4)
ALP SERPL-CCNC: 67 U/L (ref 38–116)
ALT SERPL W P-5'-P-CCNC: 10 U/L (ref 0–41)
ANION GAP SERPL CALCULATED.3IONS-SCNC: 12.6 MMOL/L
AST SERPL-CCNC: 16 U/L (ref 0–40)
BASOPHILS # BLD AUTO: 0.03 10*3/MM3 (ref 0–0.1)
BASOPHILS NFR BLD AUTO: 0.4 % (ref 0–1.1)
BILIRUB SERPL-MCNC: 0.5 MG/DL (ref 0.1–1.2)
BUN BLD-MCNC: 16 MG/DL (ref 6–20)
BUN/CREAT SERPL: 18.8 (ref 7.3–30)
CALCIUM SPEC-SCNC: 9.4 MG/DL (ref 8.5–10.2)
CHLORIDE SERPL-SCNC: 96 MMOL/L (ref 98–107)
CO2 SERPL-SCNC: 26.4 MMOL/L (ref 22–29)
CREAT BLD-MCNC: 0.85 MG/DL (ref 0.7–1.3)
DEPRECATED RDW RBC AUTO: 45.3 FL (ref 37–49)
EOSINOPHIL # BLD AUTO: 0.12 10*3/MM3 (ref 0–0.36)
EOSINOPHIL NFR BLD AUTO: 1.4 % (ref 1–5)
ERYTHROCYTE [DISTWIDTH] IN BLOOD BY AUTOMATED COUNT: 14.4 % (ref 11.7–14.5)
GFR SERPL CREATININE-BSD FRML MDRD: 89 ML/MIN/1.73
GLOBULIN UR ELPH-MCNC: 2.9 GM/DL (ref 1.8–3.5)
GLUCOSE BLD-MCNC: 81 MG/DL (ref 74–124)
HCT VFR BLD AUTO: 40.6 % (ref 40–49)
HGB BLD-MCNC: 13.6 G/DL (ref 13.5–16.5)
IMM GRANULOCYTES # BLD: 0.04 10*3/MM3 (ref 0–0.03)
IMM GRANULOCYTES NFR BLD: 0.5 % (ref 0–0.5)
LYMPHOCYTES # BLD AUTO: 1.24 10*3/MM3 (ref 1–3.5)
LYMPHOCYTES NFR BLD AUTO: 14.8 % (ref 20–49)
MCH RBC QN AUTO: 28.9 PG (ref 27–33)
MCHC RBC AUTO-ENTMCNC: 33.5 G/DL (ref 32–35)
MCV RBC AUTO: 86.4 FL (ref 83–97)
MONOCYTES # BLD AUTO: 0.47 10*3/MM3 (ref 0.25–0.8)
MONOCYTES NFR BLD AUTO: 5.6 % (ref 4–12)
NEUTROPHILS # BLD AUTO: 6.46 10*3/MM3 (ref 1.5–7)
NEUTROPHILS NFR BLD AUTO: 77.3 % (ref 39–75)
NRBC BLD MANUAL-RTO: 0 /100 WBC (ref 0–0)
PLATELET # BLD AUTO: 196 10*3/MM3 (ref 150–375)
PMV BLD AUTO: 8 FL (ref 8.9–12.1)
POTASSIUM BLD-SCNC: 4.4 MMOL/L (ref 3.5–4.7)
PROT SERPL-MCNC: 7 G/DL (ref 6.3–8)
RBC # BLD AUTO: 4.7 10*6/MM3 (ref 4.3–5.5)
SODIUM BLD-SCNC: 135 MMOL/L (ref 134–145)
WBC NRBC COR # BLD: 8.36 10*3/MM3 (ref 4–10)

## 2017-12-11 PROCEDURE — 85025 COMPLETE CBC W/AUTO DIFF WBC: CPT | Performed by: INTERNAL MEDICINE

## 2017-12-11 PROCEDURE — 36415 COLL VENOUS BLD VENIPUNCTURE: CPT | Performed by: INTERNAL MEDICINE

## 2017-12-11 PROCEDURE — 80053 COMPREHEN METABOLIC PANEL: CPT | Performed by: INTERNAL MEDICINE

## 2017-12-11 PROCEDURE — 99214 OFFICE O/P EST MOD 30 MIN: CPT | Performed by: INTERNAL MEDICINE

## 2017-12-11 NOTE — PROGRESS NOTES
REASONS FOR FOLLOWUP:     1.;    Remote history of non-Hodgkin lymphoma, in remission for more than 18 years. He received Rituxan at the time of his original diagnosis. No recurrence since then. As per 10/08/2015, the patient remains asymptomatic in regard to lymphoma with no fevers, chi  lls, night sweats, pruritus, changes in weight, performance status, peripheral adenopathy or rashes in the skin. Normal clinical examination. The patient will remain in observation.       HISTORY OF PRESENT ILLNESS:  Mr. Richardson returns today to the office for followup in the company of his wife.Today he has a multitude of complaints including the fact that he moved from his old house to an apartment almost 10 months ago and since then he became so depressed that he landed in the [facility] psych veliz for many days requiring to take now antidepression medication. He does not feel any better. Subsequently playing golf he had an accidental fall and he fractured his left humerus. He was in a sling for almost 2 months. Subsequently a few weeks ago his granddaughter came from [place] to visit and she was sick and the patient landed with a respiratory infection and pneumonia and he had fever until 4 days ago with associated cough and purulent sputum production. Doxycycline given to him by his primary physician with some improvement. Therefore in the middle of all of this he is not happy in his apartment, he has no contact with anybody, he has not been able to play golf and he has not socialized, neither worked as a volunteer in any other event. He is completely isolated and he is tired of living. He has no suicidal ideation. He has been seen by his psychiatrist 3 days ago and his medications for depression have been modified and for his anxiety as well. Again he is no longer taking antibiotics. He has lost copious amount of weight, his appetite is marginal and he has loose bowel movements since the initiation of the  antibiotic with no abdominal cramping. Urination is ongoing with no difficulties. New prostate check has been done and his PSA remains appropriate. He has no new cardiovascular issues. No new neurological issues.    He denies any fevers, chills or night sweats at this time. No adenopathy and no rashes in the skin.       The patient was brought back to the office on 12/11/2017 to check him after his recent episode of pneumonia, he was feeling dramatically better in regard to physical status with minimal, if any, cough, no shortness of breath, no sputum production, no pleuritic pain, no fevers, no chills, but he complained of nocturnal sweats.  He was worried that his lymphoma was recurrent.  He has not felt any adenopathies or any palpable abnormalities otherwise.  His appetite has improved, his weight has improved, he has normal bowel activity, normal urination, no rashes in the skin, no neurological symptomatology.  Even his depression was better.  He was planning to go back and volunteer.  He has read on multiple occasions, the written information about depression and how to pull himself out of crisis.  This was extremely useful.             PAST MEDICAL HISTORY:  The patient has had the common childhood illnesses and hemorrhoidectomy in 1976 and 1977.  In December of 1996 he had left axillary lymphadenopathy completely excise (4 cm).   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  No therapy was re  commended as she was LESLIE on careful staging workup. This included a negative bone marrow. Chest x-ray in 1998 showed a granuloma in the left upper lobe with calcified nodes in the left hilum.  He is PPD negative by history.          In June of 1997, he was admitted to [facility] by Dr. Thomason and seen in consultation by   Te with acute polyarthralgia and he was briefly treated with steroids.          In August of 1999, he had evidence of recurrent disease with mediastinal, axillary and mesenteric lymphadenopath  y.  Right axillary lymph node biopsy by Dr. Fernandez ([facility] #[id number]) demonstrated PDL nodular lymphoma. The flow cytometry confirmed the fact that he was CD20 positive.          A trial of Leukeran was initiated in August of 1999 without response documented in October.  Therapy with Rituxan initiated on 10/14/99 (LYM-5).  Subsequent scans have shown slow, steady response to Rituxan.     Most recent scans were in January 2001 showing further slight interval decrease in size of the lymph nodes.         Scans in July of 2002 showed no evidence of lymphadenopathy.  Repeat scans in July of 2003 are LESLIE.        Surveillance scans in December 2004 and again in July 2005 showed no evidence of disease.        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  PSA elevation 12/06 requiring probably reevaluation in 2007 by Dr. Jefry Carballo.          Dr. Carballo performed prostate biopsy in March 2007 with no evidence o  f malignancy.  Also in 2007 Mr. Richardson underwent carotid Doppler scans showing no significant carotid stenosis and an echocardiogram showing some calcification of the aortic valve with mild aortic insufficiency.  He continues to followup with [facility].        MRI of brain in July 2009 failed to document any major abnormalities.          CT scans of chest, abdomen, and pelvis failed to document any progression of his lymphoma as per August 2009.           On 08/29/13 the patient states that he has been seen by Dr. Cox, his cardiologist, in regard his heart murmur and this has not changed overall and no changes to medications was advised.          He has been seen by his urologist and because he has a minor rise in his PSA to 5.1 the  patient was advised to hav  e a new prostate biopsy and part of the material will be sent for new testing including DNA analysis that would include or exclude once and for all, being the third biopsy on his prostate, that he has or does not have prostate cancer.         HEMATOLOGIC/ONCOLOGIC HISTORY:  History from previous dates can be found in the separate document.         MEDICATIONS:  The current medication list was reviewed with the patient and updated in the EMR this date per the Medical Assistant.  Medication dosages and frequencies were confirmed to be accurate.        SOCIAL HISTORY: He is  and has a daughter. He is an .  He does not smoke or drink, an occasional beer.         FAMILY HISTORY: His father  at the age of 83, mother at the age of 72; both of heart failure.  He has two brothers and a sister. An aunt was treated for lymphoma.         REVIEW OF SYSTEMS:   General: STATED fever, chills, fatigue, weight changes, or lack of appetite.  Eyes: no epiphora, xerophthalmia,conjunctivitis, pain, glaucoma, blurred vision, blindness, secretion, photophobia, proptosis, diplopia.  Ears: no otorrhea, tinnitus, otorrhagia, deafness, pain, vertigo.  Nose: no rhinorrhea, epistaxis, alteration in perception of odors, sinuses pressure.  Mouth: no alteration in gums or teeth,  ulcers, no difficulty with mastication or deglut ion, no odynophagia.  Neck: no masses or pain, no thyroid alterations, no pain in muscles or arteries, no carotid odynia, no crepitation.  Respiratory: nSTATED cough, sputum production, NO dyspnea, trepopnea, pleuritic pain, hemoptysis.  Heart: no syncope, irregularity, palpitations, angina, orthopnea, paroxysmal nocturnal dyspnea.  Vascular Venous: no tenderness,edema, palpable cords, postphlebitic syndrome, skin changes or ulcerations.  Vascular Arterial: no distal ischemia, claudication, gangrene, neuropathic ischemic pain, skin ulcers, paleness or cyanosis.  GI: no  "dysphagia, odynophagia, no regurgitation, no heartburn,no indigestion,no nausea,no vomiting,no hematemesis ,no melena,no jaundice,no distention, no obstipation,no enterorrhagia,no proctalgia,no anal  lesions, STATED changes in bowel habits.  : no frequency, hesitancy, hematuria, discharge, pain.  Musculoskeletal: no muscle or tendon pain or inflammation, joint pain, edema, LEFT SHOULDER AFTER FXfunctional limitation, NO fasciculations, mass.  Neurologic: no headache, seizures, alterations on Craneal nerves, no motor or senssory deficit, normal coordination, no alteration in memory, orientation, calculation,writting, verbal or written language.  Skin: no rashes, pruritus or localized lesions.  Psychiatric: SEVERE anxiety,AND depression,NO agitation, delusions, proper insight.  NO SUICIDAL IDEATION       VITAL SIGNS:  Vitals:    17 0825   BP: 132/80   Pulse: 64   Resp: 16   Temp: 98.3 °F (36.8 °C)   Weight: 68.6 kg (151 lb 3.2 oz)   Height: 180.3 cm (70.98\")          PAIN:  0 out of 10      ECO         PHYSICAL EXAMINATION:   GENERAL:  Well-developed, well-nourished  Patient  in no acute distress.   SKIN:  Warm, dry without rashes, purpura or petechiae.  HEENT:  Pupils were equal and reactive to light and accomodation, conjunctivas non injected, no pterigion, normal extraocular movements, normal visual acuity r eye, minimal va left eye   Mouth mucosa was moist, no exudates in oropharynx, normal gum line, normal roof of the mouth and pillars, normal papillations of the tongue.Ear canals were normal, as well tympanic membranes, normal hearing acuity.No pain in mastoid area or erythema.  NECK:  Supple with good range of motion; no thyromegaly or masses, no JVD or bruits, no cervical adenopathies.No carotid arteries pain, no carotid abnormal pulsation or arterial dance.  LYMPHATICS:  No cervical, supraclavicular, axillary, epitrochlear or inguinal adenopathy.  CHEST:  Normal excursion of both holland thoraces, " normal voice fremitus, no subcutaneous emphysema, normal axillas, no rashes or acanthosis nigricans. Lungs clear to percussion and auscultation, normal breath sounds bilaterally, no wheezing, crackles or ronchi, no stridor, no rubs.  CARDIAC AND VASCULAR: PMI not displaced,no thrills, normal rate and regular rhythm, with systolic basal g2/6 murmur, rubs or S3 or S4 right or left sided gallops. Normal femoral, popliteal, pedis, brachial and carotid pulses.  ABDOMEN:  Soft, nontender with no organomegaly or masses, no ascites, no collateral circulation,no distention,no Chay sign, no abdominal pain, no inguinal hernias,no umbilical hernias, no abdominal bruits. Normal bowel sounds.  GENITAL: Not  Performed.  EXTREMITIES  AND SPINE:  No clubbing, cyanosis or edema, no deformities or pain .No kyphosis, scoliosis, deformities or pain in spine, ribs or pelvic bone.  NEUROLOGICAL:  Patient was awake, alert, oriented to time, person and place,normal gait and coordination,  LABORATORY DATA:     Component      Latest Ref Rng & Units 11/14/2016 11/16/2017 12/11/2017           8:56 AM  8:56 AM  7:58 AM   WBC      4.00 - 10.00 10*3/mm3 7.41 12.36 (H) 8.36   RBC      4.30 - 5.50 10*6/mm3 4.92 4.61 4.70   Hemoglobin      13.5 - 16.5 g/dL 14.9 13.7 13.6   Hematocrit      40.0 - 49.0 % 42.3 39.6 (L) 40.6   MCV      83.0 - 97.0 fL 86.0 85.9 86.4   MCH      27.0 - 33.0 pg 30.3 29.7 28.9   MCHC      32.0 - 35.0 g/dL 35.2 (H) 34.6 33.5   RDW      11.7 - 14.5 % 12.6 13.3 14.4   RDW-SD      37.0 - 49.0 fl 39.1 41.3 45.3   MPV      8.9 - 12.1 fL 8.2 (L) 7.7 (L) 8.0 (L)   Platelets      150 - 375 10*3/mm3 211 416 (H) 196   Neutrophil %      39.0 - 75.0 % 79.0 (H) 79.2 (H) 77.3 (H)   Lymphocyte %      20.0 - 49.0 % 13.8 (L) 11.6 (L) 14.8 (L)   Monocyte %      4.0 - 12.0 % 5.5 5.1 5.6   Eosinophil %      1.0 - 5.0 % 0.8 (L) 0.7 (L) 1.4      3wk ago      Vitamin B-12 211 - 946 pg/mL 834   View Full Report              C-reactive Protein    Order: [id number]   Collected:  11/16/2017  8:56 AM      Ref Range & Units 3wk ago     C-Reactive Protein 0.00 - 0.50 mg/dL 0.41   View Full Report        Related Result Highlights       TSH  Final result 11/16/2017                     TSH   Order: [id number]   Collected:  11/16/2017  8:56 AM      Ref Range & Units 3wk ago     TSH 0.270 - 4.200 mIU/mL 2.620   View Full Report        Related Result Highlights       C-reactive Protein  Final result 11/16/2017                        Comprehensive Metabolic Panel   Order: [id number]   Collected:  11/16/2017  8:56 AM      Ref Range & Units 3wk ago     Glucose 74 - 124 mg/dL 87   BUN 6 - 20 mg/dL 16   Creatinine 0.70 - 1.30 mg/dL 0.88   Sodium 134 - 145 mmol/L 133 (L)   Potassium 3.5 - 4.7 mmol/L 4.4   Chloride 98 - 107 mmol/L 96 (L)   CO2 22.0 - 29.0 mmol/L 25.8   Calcium 8.5 - 10.2 mg/dL 9.5   Total Protein 6.3 - 8.0 g/dL 7.3   Albumin 3.50 - 5.20 g/dL 3.90   ALT (SGPT) 0 - 41 U/L 28   AST (SGOT) 0 - 40 U/L 23   Alkaline Phosphatase 38 - 116 U/L 70   Total Bilirubin 0.1 - 1.2 mg/dL 0.4   eGFR Non African Amer >60 mL/min/1.73 86   Globulin 1.8 - 3.5 gm/dL 3.4   A/G Ratio 1.1 - 2.4 g/dL 1.1   BUN/Creatinine Ratio 7.3 - 30.0 18.2   Anion Gap mmol/L 11.2   View Full Report                  ASSESSMENT/PLAN: This patient has history of non-Hodgkin ly  mphoma that occurred close to 20 years ago. He was treated with Rituxan. He never had a recurrence. 1. On clinical grounds the patient has nothing to suggest recurrence of his lymphoma.    2. The patient has leukocytosis and thrombocytosis. We retested these numbers today and his white count, hemoglobin, and platelets have returned to normality.  We have done testing of his chemistry profile, completely normal, TSH completely normal, B12 completely normal and C-reactive protein fine.  Therefore, there is no obvious inflammatory condition that could be triggering any other systemic situations.  Obviously, the worry that he has now  is refocusing himself in the possibility of lymphoma recurrence given the fact that he has nocturnal sweats.    He is very thirsty; actually what he has is dry mouth, probably related to the use of his antipsychotic medication.  I reviewed with him his chemistry profile, his TSH, his B12, his C-reactive protein; copies of report were provided to him.  He is still very worried about lymphoma recurrence.  For this reason, I will proceed with a CT scan of the chest, abdomen, and pelvis in a few days and will call him with the report of this; I doubt very much that he has lymphoma recurrence.  Otherwise, a tentative appointment to bring him back and see us in a year with a CBC and CMP.

## 2017-12-14 ENCOUNTER — HOSPITAL ENCOUNTER (OUTPATIENT)
Dept: PET IMAGING | Facility: HOSPITAL | Age: 72
Discharge: HOME | End: 2017-12-14
Attending: INTERNAL MEDICINE | Admitting: INTERNAL MEDICINE

## 2017-12-14 DIAGNOSIS — Z85.79: ICD-10-CM

## 2017-12-14 LAB — CREAT BLDA-MCNC: 0.7 MG/DL (ref 0.6–1.3)

## 2017-12-14 PROCEDURE — 74177 CT ABD & PELVIS W/CONTRAST: CPT

## 2017-12-14 PROCEDURE — 0 IOPAMIDOL 61 % SOLUTION: Performed by: INTERNAL MEDICINE

## 2017-12-14 PROCEDURE — 71260 CT THORAX DX C+: CPT

## 2017-12-14 PROCEDURE — 0 DIATRIZOATE MEGLUMINE & SODIUM PER 1 ML: Performed by: INTERNAL MEDICINE

## 2017-12-14 PROCEDURE — 82565 ASSAY OF CREATININE: CPT

## 2017-12-14 RX ADMIN — DIATRIZOATE MEGLUMINE AND DIATRIZOATE SODIUM 30 ML: 660; 100 LIQUID ORAL; RECTAL at 07:25

## 2017-12-14 RX ADMIN — IOPAMIDOL 85 ML: 612 INJECTION, SOLUTION INTRAVENOUS at 08:15

## 2017-12-19 ENCOUNTER — TELEPHONE (OUTPATIENT)
Dept: ONCOLOGY | Facility: HOSPITAL | Age: 72
End: 2017-12-19

## 2017-12-19 ENCOUNTER — TELEPHONE (OUTPATIENT)
Dept: ONCOLOGY | Facility: CLINIC | Age: 72
End: 2017-12-19

## 2017-12-19 DIAGNOSIS — Z85.79: Primary | ICD-10-CM

## 2017-12-19 NOTE — TELEPHONE ENCOUNTER
Pt calling for CT scan results. Explained to pt Dr Cohen will review and then we will follow up with pt once we hear back from MD.  Pt v/u.  Message sent to Dr Cohen to review CT scan. Waiting to hear back from MD.

## 2017-12-19 NOTE — PROGRESS NOTES
Per message from EFFIE Portillo RN verbal order Dr Cohen called and gave orders for pt to get CT scan of chest in 2 months

## 2017-12-19 NOTE — TELEPHONE ENCOUNTER
Dr Cohen called and reviewed scan result with MD.  MD stated to call pt and review with pt that areas noted on chest was related to pneumonia 3-4 weeks ago and to do repeat CT scan of chest in 2 months.     Called pt and reviewed above with pt and pt v/u.  Scheduling will call pt to set up appt.  Note sent to scheduling to call pt and Jocelyn ACOSTA to place order for scan.

## 2018-02-13 ENCOUNTER — HOSPITAL ENCOUNTER (OUTPATIENT)
Dept: PET IMAGING | Facility: HOSPITAL | Age: 73
Discharge: HOME | End: 2018-02-13
Attending: INTERNAL MEDICINE | Admitting: INTERNAL MEDICINE

## 2018-02-13 DIAGNOSIS — Z85.79: ICD-10-CM

## 2018-02-13 LAB — CREAT BLDA-MCNC: 0.8 MG/DL (ref 0.6–1.3)

## 2018-02-13 PROCEDURE — 0 IOPAMIDOL 61 % SOLUTION: Performed by: INTERNAL MEDICINE

## 2018-02-13 PROCEDURE — 82565 ASSAY OF CREATININE: CPT

## 2018-02-13 PROCEDURE — 71260 CT THORAX DX C+: CPT

## 2018-02-13 RX ADMIN — IOPAMIDOL 75 ML: 612 INJECTION, SOLUTION INTRAVENOUS at 08:44

## 2018-02-14 ENCOUNTER — TELEPHONE (OUTPATIENT)
Dept: ONCOLOGY | Facility: CLINIC | Age: 73
End: 2018-02-14

## 2018-02-14 NOTE — TELEPHONE ENCOUNTER
PHONE WITH PT CT NEGATIVE FOR LYMPHOMA, RESOLVED PNEUMONIA COMPLETELY, NO OTHER CHANGES IN CARE, HE AGREES

## 2018-07-23 ENCOUNTER — TELEPHONE (OUTPATIENT)
Dept: ORTHOPEDIC SURGERY | Facility: CLINIC | Age: 73
End: 2018-07-23

## 2018-07-23 ENCOUNTER — OFFICE VISIT (OUTPATIENT)
Dept: ORTHOPEDIC SURGERY | Facility: CLINIC | Age: 73
End: 2018-07-23

## 2018-07-23 VITALS — HEIGHT: 71 IN | WEIGHT: 157.8 LBS | BODY MASS INDEX: 22.09 KG/M2

## 2018-07-23 DIAGNOSIS — S42.001A CLOSED NONDISPLACED FRACTURE OF RIGHT CLAVICLE, UNSPECIFIED PART OF CLAVICLE, INITIAL ENCOUNTER: Primary | ICD-10-CM

## 2018-07-23 PROCEDURE — 23500 CLTX CLAVICULAR FX W/O MNPJ: CPT | Performed by: ORTHOPAEDIC SURGERY

## 2018-07-23 PROCEDURE — 99214 OFFICE O/P EST MOD 30 MIN: CPT | Performed by: ORTHOPAEDIC SURGERY

## 2018-07-23 RX ORDER — ACETAMINOPHEN AND CODEINE PHOSPHATE 300; 30 MG/1; MG/1
1 TABLET ORAL EVERY 6 HOURS PRN
Refills: 0 | COMMUNITY
Start: 2018-07-21 | End: 2019-12-17

## 2018-07-23 RX ORDER — AMOXICILLIN 500 MG/1
TABLET, FILM COATED ORAL
Refills: 0 | COMMUNITY
Start: 2018-07-18 | End: 2018-08-06

## 2018-07-23 RX ORDER — AMLODIPINE BESYLATE 2.5 MG/1
2.5 TABLET ORAL DAILY
Refills: 5 | COMMUNITY
Start: 2018-07-09

## 2018-07-23 RX ORDER — FLUCONAZOLE 200 MG/1
TABLET ORAL
Refills: 8 | COMMUNITY
Start: 2018-07-09 | End: 2019-12-17

## 2018-07-24 NOTE — PROGRESS NOTES
History & Physical       Patient: Leroy Richardson    YOB: 1945    Medical Record Number: [id number]    Chief Complaints: Right shoulder injury    History of Present Illness: 72 y.o. male presents for evaluation of the right shoulder.  The injury was sustained on July 21, when he was visiting his daughter and missed a stair, landing awkwardly on his right side.  He was seen at an outside emergency room and diagnosed with a clavicle fracture.  He was placed in a sling.  Describes the pain as moderate, constant, and aching.  The pain is worse with movement.  The pain is better with rest, pain medicine and use of the sling.  Denies any other injuries or complaints.    Allergies: No Known Allergies    Home Medications:      Current Outpatient Prescriptions:   •  acetaminophen-codeine (TYLENOL #3) 300-30 MG per tablet, Take 1 tablet by mouth Every 6 (Six) Hours As Needed. for pain, Disp: , Rfl: 0  •  amLODIPine (NORVASC) 2.5 MG tablet, Take 2.5 mg by mouth Daily., Disp: , Rfl: 5  •  amoxicillin (AMOXIL) 500 MG tablet, TAKE 1 TABLET BY MOUTH THREE TIMES DAILY X 7 DAYS, Disp: , Rfl: 0  •  aspirin (ASPIRIN ADULT LOW STRENGTH) 81 MG EC tablet, Take  by mouth daily., Disp: , Rfl:   •  cyanocobalamin (VITAMIN B-12) 500 MCG tablet, Take  by mouth., Disp: , Rfl:   •  finasteride (PROSCAR) 5 MG tablet, Take 5 mg by mouth daily., Disp: , Rfl:   •  fluconazole (DIFLUCAN) 200 MG tablet, TAKE ONE TABLET BY MOUTH ONCE PER WEEK UNTIL NAILS GROW OUT, Disp: , Rfl: 8  •  nebivolol (BYSTOLIC) 5 MG tablet, Take 1 tablet by mouth Daily., Disp: 90 tablet, Rfl: 3  •  Omega-3 Fatty Acids (FISH OIL) 1200 MG capsule capsule, Take  by mouth., Disp: , Rfl:   •  sertraline (ZOLOFT) 100 MG tablet, Take 150 mg by mouth Daily., Disp: , Rfl:   •  clonazePAM (KlonoPIN) 0.5 MG tablet, Take 0.5 mg by mouth. Once everyother day, Disp: , Rfl:     Past Medical History:   Diagnosis Date   • Anxiety    • Aortic insufficiency     Mild   •  "Depression    • Heart murmur    • Lymphoma, non-Hodgkin's (CMS/HCC)    • Polyarthralgia 06/1997   • Sleep apnea    • Stroke (CMS/HCC)     eye          Past Surgical History:   Procedure Laterality Date   • AXILLARY LYMPH NODE BIOPSY/EXCISION Left 12/1996   • HEMORRHOIDECTOMY  1976, 1977   • PROSTATE BIOPSY  03/2007          Social History     Occupational History   •  Retired     Social History Main Topics   • Smoking status: Never Smoker   • Smokeless tobacco: Not on file   • Alcohol use Yes      Comment: Occasional beer   • Drug use: Unknown   • Sexual activity: Defer      Social History     Social History Narrative   • No narrative on file          Family History   Problem Relation Age of Onset   • Heart failure Mother    • Heart failure Father    • Lymphoma Maternal Aunt    • Heart disease Other    • Hypertension Other    • Cancer Other         non-Hodgkin       Review of Systems:      Constitutional: Denies fever, shaking or chills   Eyes: Denies change in visual acuity   HEENT: Denies nasal congestion or sore throat   Respiratory: Denies cough or shortness of breath   Cardiovascular: Denies chest pain or edema  Endocrine: Denies tremors, palpitations, intolerance of heat or cold, polyuria, polydipsia.  GI: Denies abdominal pain, nausea, vomiting, bloody stools or diarrhea  : Denies frequency, urgency, incontinence, retention, or nocturia.  Musculoskeletal: Denies numbness tingling or loss of motor function except as above  Integument: Denies rash, lesion or ulceration   Neurologic: Denies headache or focal weakness, deficits  Heme: Denies epistaxis, spontaneous or excessive bleeding, epistaxis, hematuria, melena, fatigue, enlarged or tender lymph nodes.      All other pertinent positives and negatives as noted above in HPI.    Physical Exam: 72 y.o. male    Vitals:    07/23/18 1608   Weight: 71.6 kg (157 lb 12.8 oz)   Height: 180.3 cm (71\")       General:  Patient is awake and alert.  Appears in no " acute distress or discomfort.    Psych:  Affect and demeanor are appropriate.    Eyes:  Conjunctiva and sclera appear grossly normal.  Eyes track well and EOM seem to be intact.    Dentition:  No gross abnormalities noted.    Ears:  No gross abnormalities.  Hearing adequate for the exam.    Cardiovascular:  Regular rate and rhythm.    Lungs:  Good chest expansion.  Breathing unlabored.    Lymph:  No palpable masses or adenopathy in the affected extremity    Left upper extremity:  Sling was in place and removed.  Skin appears benign.  There is slight prominence over the mid shaft the clavicle.  Focal tenderness noted over the midshaft of the clavicle where there is a palpable step-off.  No palpable masses or adenopathy.  Compartments soft.  Painful, limited ROM of the shoulder.  Could not assess stability.  No tenderness noted over the lower arm, elbow, forearm, wrist or hand.  Good strength in the wrist with flexion and extension as well as , pinch, finger and thumb abduction strength.  Intact sensation.  Brisk cap refill.  Palpable radial pulse.      Diagnostic Tests:  Lab Results   Component Value Date    GLUCOSE 81 12/11/2017    CALCIUM 9.4 12/11/2017     12/11/2017    K 4.4 12/11/2017    CO2 26.4 12/11/2017    CL 96 (L) 12/11/2017    BUN 16 12/11/2017    CREATININE 0.80 02/13/2018    EGFRIFNONA 89 12/11/2017    BCR 18.8 12/11/2017    ANIONGAP 12.6 12/11/2017     Lab Results   Component Value Date    WBC 8.36 12/11/2017    HGB 13.6 12/11/2017    HCT 40.6 12/11/2017    MCV 86.4 12/11/2017     12/11/2017     No results found for: INR, PROTIME    Imaging:  Outside AP and orthogonal views of the Right clavicle are reviewed.  No comparison films are available.  He has what appears to be a midshaft clavicle fracture with some displacement but no significant shortening    Assessment:  Right clavicle fracture    Plan: We had a thorough discussion regarding the risks of non-surgical management versus  surgery.  With closed treatment, there is the risk of further displacement, malunion, nonunion or persistent pain or loss of motion/function that could require further intervention in the future.  I explained to the patient that there is a good chance that this will heal with conservative treatment.  I recommend that he continue the sling for comfort.  I will see him back in 1 week for repeat x-rays.  We discussed appropriate activity modifications and restrictions.  I offered him a prescription for pain medicine which he declined.    Date: 7/24/2018    Jarrett Hung MD    CC to Cristina Daniel MD

## 2018-08-06 ENCOUNTER — OFFICE VISIT (OUTPATIENT)
Dept: ORTHOPEDIC SURGERY | Facility: CLINIC | Age: 73
End: 2018-08-06

## 2018-08-06 VITALS — HEIGHT: 71 IN | BODY MASS INDEX: 21.7 KG/M2 | WEIGHT: 155 LBS

## 2018-08-06 DIAGNOSIS — Z09 FRACTURE FOLLOW-UP: Primary | ICD-10-CM

## 2018-08-06 PROCEDURE — 99024 POSTOP FOLLOW-UP VISIT: CPT | Performed by: ORTHOPAEDIC SURGERY

## 2018-08-06 PROCEDURE — 73000 X-RAY EXAM OF COLLAR BONE: CPT | Performed by: ORTHOPAEDIC SURGERY

## 2018-08-06 NOTE — PROGRESS NOTES
Mr. Richardson comes in today for follow-up.  He is now roughly 2 weeks out from injury.  Denies any new problems or issues.    There is prominence over the mid shaft the clavicle.  Skin is benign.  There is some resolving ecchymosis anteriorly.  Shoulder motion is very well tolerated.  Elevation is to 150°, external rotation 40°, internal rotation to T12.    AP and AP axial views of the right clavicle are ordered and reviewed.  These are compared to previous x-rays.  Alignment appears unchanged.  No callus formation yet.    Assessment: 2 weeks status post closed treatment of right clavicle fracture    Plan: We discussed surgical and nonsurgical treatment.  I recommend continued conservative care.  He can start working on some early range of motion exercises.  He is to continue the sling when up for prolonged period time or out of the house.  I will see him back in 2 weeks.    Jarrett Hung MD

## 2018-08-20 ENCOUNTER — OFFICE VISIT (OUTPATIENT)
Dept: ORTHOPEDIC SURGERY | Facility: CLINIC | Age: 73
End: 2018-08-20

## 2018-08-20 VITALS — HEIGHT: 71 IN | WEIGHT: 157 LBS | BODY MASS INDEX: 21.98 KG/M2

## 2018-08-20 DIAGNOSIS — Z09 FRACTURE FOLLOW-UP: Primary | ICD-10-CM

## 2018-08-20 PROCEDURE — 73000 X-RAY EXAM OF COLLAR BONE: CPT | Performed by: ORTHOPAEDIC SURGERY

## 2018-08-20 PROCEDURE — 99024 POSTOP FOLLOW-UP VISIT: CPT | Performed by: ORTHOPAEDIC SURGERY

## 2018-08-20 NOTE — PROGRESS NOTES
Mr. Richardson comes in today for follow-up of the right clavicle.  He tells me it seems to be feeling better.  Interestingly, he says that he feels like the clavicle is taking longer to heal than his humerus.  Pain is minimal.  He has been compliant with use of the sling.    Contour the shoulder looks the same.  There is prominence over the mid shaft the clavicle but the overlying skin is benign.  Mild tenderness in this area without gross motion or crepitus.  He can actively elevate to about 140°, abduct about 90°, externally rotate roughly 45-50° and internally rotate to roughly T12.  Shoulder motion is well tolerated.    AP and AP axial views of the right clavicle are ordered and reviewed.  These are compared to previous x-rays.  Alignment appears unchanged.  There does appear to be some callus formation.    Assessment: One-month status post closed treatment of right clavicle fracture    Plan: He can wean out of the sling at this point and begin working on progressing his range of motion.  I have recommended physical therapy for him and he was given a referral for this.  We discussed appropriate activity modifications and restrictions.  I will see him back in 1 month to reevaluate.    Jarrett Hung MD

## 2018-08-27 ENCOUNTER — TREATMENT (OUTPATIENT)
Dept: PHYSICAL THERAPY | Facility: CLINIC | Age: 73
End: 2018-08-27

## 2018-08-27 DIAGNOSIS — M25.60 LIMITED JOINT RANGE OF MOTION: ICD-10-CM

## 2018-08-27 DIAGNOSIS — S42.001D CLOSED NONDISPLACED FRACTURE OF RIGHT CLAVICLE WITH ROUTINE HEALING, UNSPECIFIED PART OF CLAVICLE, SUBSEQUENT ENCOUNTER: Primary | ICD-10-CM

## 2018-08-27 PROCEDURE — 97161 PT EVAL LOW COMPLEX 20 MIN: CPT | Performed by: PHYSICAL THERAPIST

## 2018-08-27 PROCEDURE — 97110 THERAPEUTIC EXERCISES: CPT | Performed by: PHYSICAL THERAPIST

## 2018-08-27 PROCEDURE — G8985 CARRY GOAL STATUS: HCPCS | Performed by: PHYSICAL THERAPIST

## 2018-08-27 PROCEDURE — G8984 CARRY CURRENT STATUS: HCPCS | Performed by: PHYSICAL THERAPIST

## 2018-08-27 NOTE — PROGRESS NOTES
Physical Therapy Initial Evaluation and Plan of Care    Patient Name: Leroy Richardson       Patient MRN: [id number]  : 1945  Physician:Jarrett Hung MD  Date: 2018    Encounter Diagnoses   Name Primary?   • Closed nondisplaced fracture of right clavicle with routine healing, unspecified part of clavicle, subsequent encounter Yes         Subjective Evaluation    History of Present Illness  Date of onset: 2018  Mechanism of injury: Missed a step and fell landing on right shoulder. Having pain in chest when sleeping on L side. No significant pain with a deep breath. Wondering if has a fractured rib.     Subjective comment: Quick Dash 41%  Patient Occupation: part time usher for the [employer] Quality of life: good    Pain  Current pain ratin  At worst pain ratin  Location: mid R clavicle and in arm when lifing arm  Quality: sharp  Relieving factors: ice, heat and medications  Aggravating factors: lifting, overhead activity, prolonged positioning, sleeping, repetitive movement and movement  Progression: improved    Social Support  Lives with: spouse    Hand dominance: right    Diagnostic Tests  X-ray: abnormal    Treatments  Previous treatment: immobilization  Patient Goals  Patient goals for therapy: decreased edema, decreased pain, increased strength, independence with ADLs/IADLs, return to work and increased motion  Patient goal: golf           Objective       Observations     Right Shoulder  Positive for deformity (mid clavicle) and edema.     Tenderness     Right Shoulder  Tenderness in the clavicle.     Cervical/Thoracic Screen   Cervical range of motion within normal limits    Neurological Testing     Sensation     Shoulder     Right Shoulder   Intact: light touch    Active Range of Motion     Right Shoulder   Flexion: 92 degrees   Extension: 56 degrees   Abduction: 112 degrees   Adduction: WFL  External rotation 90°: 90 degrees  Internal rotation 90°: 56 degrees   Internal  rotation BTB: L1   Horizontal abduction: WFL    Joint Play     Right Shoulder  Hypomobile in the anterior capsule and posterior capsule.     Strength/Myotome Testing     Additional Strength Details  NA secondary to recent fracture    Tests     Additional Tests Details  NA secondary to recent fracture         Assessment & Plan     Assessment  Impairments: abnormal or restricted ROM, activity intolerance, impaired physical strength, lacks appropriate home exercise program and pain with function  Assessment details: Patient is a good candidate for PT to address the listed impairments and functional limitations. Patient is s/p R clavicle fracture from a fall on 18.     Prognosis: good  Functional Limitations: carrying objects, lifting, pulling, pushing, uncomfortable because of pain, reaching behind back, reaching overhead and unable to perform repetitive tasks  Goals  Plan Goals: ST weeks. Pt will:  1. Be independent with initial HEP  2. Report pain </= 2/10 with all ADL's  3. Demonstrate improved right GH flexion AROM >/= 120 to improve ability to reach overhead.   4. Demonstrate improved right GH abduction AROM >/= to 120 to improve ability to self care.     LT weeks. Pt will:  1. Report pain of </= 1/10 with all ADLs  2. Demonstrate improved right UE MMT of >/= 4+/5 to improve ability to perform ADLs.   3. Demonstrate improved right GH AROM to WFL so that patient can perform ADLs without problems and can return to part time work.   4. Return to work with no increased pain  5. QuickDASH </= 15%, corresponding with  CI      Plan  Therapy options: will be seen for skilled physical therapy services  Planned modality interventions: cryotherapy, thermotherapy (hydrocollator packs), ultrasound and electrical stimulation/Russian stimulation  Planned therapy interventions: manual therapy, strengthening, stretching, joint mobilization, home exercise program, flexibility and soft tissue  mobilization  Frequency: 2x week  Duration in weeks: 4  Treatment plan discussed with: patient        See Exercise, Manual, and Modality Logs for complete treatment.     PROCEDURES AND MODALITIES:  Paraffin:   pre-rx  Moist Heat:    Ice:   post-rx  E-Stim:    Ultrasound:    Ionto:   Traction:    Dry Needling:         Therapy Exercise [id number] 15 minutes     Timed Code Treatment: 15 Minutes  Total Treatment Time: 45 Minutes    PT SIGNATURE: Kathie Chapa PT, DPT, CHT  [place] License # [id number]  DATE TREATMENT INITIATED: 8/27/2018    Medicare Initial Certification  Certification Period: 11/25/2018  I certify that the therapy services are furnished while this patient is under my care.  The services outlined above are required by this patient, and will be reviewed every 90 days.     PHYSICIAN: Jarrett Hung MD      DATE:     Please sign and return via fax to [phone].. Thank you, [facility].

## 2018-08-28 ENCOUNTER — TELEPHONE (OUTPATIENT)
Dept: ORTHOPEDIC SURGERY | Facility: CLINIC | Age: 73
End: 2018-08-28

## 2018-09-07 ENCOUNTER — OFFICE VISIT (OUTPATIENT)
Dept: PHYSICAL THERAPY | Facility: CLINIC | Age: 73
End: 2018-09-07

## 2018-09-07 DIAGNOSIS — M25.60 LIMITED JOINT RANGE OF MOTION: ICD-10-CM

## 2018-09-07 DIAGNOSIS — S42.001D CLOSED NONDISPLACED FRACTURE OF RIGHT CLAVICLE WITH ROUTINE HEALING, UNSPECIFIED PART OF CLAVICLE, SUBSEQUENT ENCOUNTER: Primary | ICD-10-CM

## 2018-09-07 PROCEDURE — 97110 THERAPEUTIC EXERCISES: CPT | Performed by: PHYSICAL THERAPIST

## 2018-09-07 PROCEDURE — 97530 THERAPEUTIC ACTIVITIES: CPT | Performed by: PHYSICAL THERAPIST

## 2018-09-07 PROCEDURE — 97140 MANUAL THERAPY 1/> REGIONS: CPT | Performed by: PHYSICAL THERAPIST

## 2018-09-12 ENCOUNTER — OFFICE VISIT (OUTPATIENT)
Dept: PHYSICAL THERAPY | Facility: CLINIC | Age: 73
End: 2018-09-12

## 2018-09-12 DIAGNOSIS — S42.001D CLOSED NONDISPLACED FRACTURE OF RIGHT CLAVICLE WITH ROUTINE HEALING, UNSPECIFIED PART OF CLAVICLE, SUBSEQUENT ENCOUNTER: Primary | ICD-10-CM

## 2018-09-12 DIAGNOSIS — M25.60 LIMITED JOINT RANGE OF MOTION: ICD-10-CM

## 2018-09-12 PROCEDURE — 97110 THERAPEUTIC EXERCISES: CPT | Performed by: PHYSICAL THERAPIST

## 2018-09-12 PROCEDURE — 97140 MANUAL THERAPY 1/> REGIONS: CPT | Performed by: PHYSICAL THERAPIST

## 2018-09-12 PROCEDURE — 97530 THERAPEUTIC ACTIVITIES: CPT | Performed by: PHYSICAL THERAPIST

## 2018-09-17 ENCOUNTER — TREATMENT (OUTPATIENT)
Dept: PHYSICAL THERAPY | Facility: CLINIC | Age: 73
End: 2018-09-17

## 2018-09-17 DIAGNOSIS — S42.001D CLOSED NONDISPLACED FRACTURE OF RIGHT CLAVICLE WITH ROUTINE HEALING, UNSPECIFIED PART OF CLAVICLE, SUBSEQUENT ENCOUNTER: Primary | ICD-10-CM

## 2018-09-17 DIAGNOSIS — M25.60 LIMITED JOINT RANGE OF MOTION: ICD-10-CM

## 2018-09-17 PROCEDURE — 97110 THERAPEUTIC EXERCISES: CPT | Performed by: PHYSICAL THERAPIST

## 2018-09-17 PROCEDURE — 97140 MANUAL THERAPY 1/> REGIONS: CPT | Performed by: PHYSICAL THERAPIST

## 2018-09-17 NOTE — PROGRESS NOTES
Physical Therapy Daily Progress Note    Visit #: 4  Leroy Richardson reports: Pain in ribs is less. Shoulder is improving. I have pain down the back of my arm but this may be stretching pain. I just want to be able to swing a golf club.   Pain Scale (0-10):     Subjective       Objective   See Exercise, Manual, and Modality Logs for complete treatment.     PROCEDURES AND MODALITIES:  Paraffin:   pre-rx  Moist Heat:    Ice: Rx Minutes: 10 mins post-rx  E-Stim:    Ultrasound:    Ionto:   Traction:    Dry Needling:         Manual PT 76369 10 minutes and Therapy Exercise [id number] 28 minutes     Timed Code Treatment: 38 Minutes  Total Treatment Time: 52 Minutes    Assessment/Plan  ER and IR are improved and nearly normal. He is tolerating light strengthening well. Rib pain is nearly resolved. Abd and flex are improved but not yet functional. Minimal pain with horiz add.     Progress strengthening /stabilization /functional activity      Kathie Chapa PT, DPT, CHT  Physical Therapist  [place] License # [id number]

## 2018-09-19 ENCOUNTER — OFFICE VISIT (OUTPATIENT)
Dept: ORTHOPEDIC SURGERY | Facility: CLINIC | Age: 73
End: 2018-09-19

## 2018-09-19 VITALS — TEMPERATURE: 99.2 F | HEIGHT: 71 IN | BODY MASS INDEX: 21.84 KG/M2 | WEIGHT: 156 LBS

## 2018-09-19 DIAGNOSIS — Z09 FRACTURE FOLLOW-UP: Primary | ICD-10-CM

## 2018-09-19 PROCEDURE — 73000 X-RAY EXAM OF COLLAR BONE: CPT | Performed by: ORTHOPAEDIC SURGERY

## 2018-09-19 PROCEDURE — 99024 POSTOP FOLLOW-UP VISIT: CPT | Performed by: ORTHOPAEDIC SURGERY

## 2018-09-19 NOTE — PROGRESS NOTES
Mr. Richardson comes in today for follow-up of the right clavicle.  He is now roughly 8 weeks out from injury.  He tells me is doing much better.  Pain is pretty much resolved.  He reports good use and function of the arm.  Therapy is helping.  He feels like he is ready to transition to a home program.    Skin over the right clavicle is benign.  There is prominence over the fracture site.  No tenderness.  There is palpable callus in this area.  Shoulder motion is near full relative to the contralateral side.    AP and AP axial views the right clavicle are ordered and reviewed.  These compared to previous x-rays.  There has been interval callous formation and there is clearly evidence for interval healing.    Assessment: 8 weeks status post closed treatment of right clavicle fracture    Plan: I agree that he is ready to transition to a home program.  I will see him back in 6 weeks.  I have released him to return to golfing as per his request.

## 2018-09-20 ENCOUNTER — TREATMENT (OUTPATIENT)
Dept: PHYSICAL THERAPY | Facility: CLINIC | Age: 73
End: 2018-09-20

## 2018-09-20 DIAGNOSIS — S42.001D CLOSED NONDISPLACED FRACTURE OF RIGHT CLAVICLE WITH ROUTINE HEALING, UNSPECIFIED PART OF CLAVICLE, SUBSEQUENT ENCOUNTER: Primary | ICD-10-CM

## 2018-09-20 DIAGNOSIS — M25.60 LIMITED JOINT RANGE OF MOTION: ICD-10-CM

## 2018-09-20 PROCEDURE — 97110 THERAPEUTIC EXERCISES: CPT | Performed by: PHYSICAL THERAPIST

## 2018-09-20 NOTE — PROGRESS NOTES
Physical Therapy Daily Progress Note    Visit #: 5  Leroy Richardson reports: MD was happy with progress. He released me to play golf. Agreed to continue PT with HEP.   Pain Scale (0-10):     Subjective       Objective   See Exercise, Manual, and Modality Logs for complete treatment.     PROCEDURES AND MODALITIES:  Paraffin:   pre-rx  Moist Heat:    Ice:   post-rx  E-Stim:    Ultrasound:    Ionto:   Traction:    Dry Needling:         Therapy Exercise [id number] 41 minutes (13 min of JAYCEE indirect and unbillable)    Timed Code Treatment: 28 Minutes  Total Treatment Time: 45 Minutes    Assessment/Plan  Patient is tolerating all strengthening well and needs to continue stretching to maximize mobility of shoulder motion.   Anticipate DC next Visit to [facility]      Kathie Chapa, PT, DPT, CHT  Physical Therapist  [place] License # [id number]

## 2018-09-28 ENCOUNTER — TREATMENT (OUTPATIENT)
Dept: PHYSICAL THERAPY | Facility: CLINIC | Age: 73
End: 2018-09-28

## 2018-09-28 DIAGNOSIS — M25.60 LIMITED JOINT RANGE OF MOTION: ICD-10-CM

## 2018-09-28 DIAGNOSIS — S42.001D CLOSED NONDISPLACED FRACTURE OF RIGHT CLAVICLE WITH ROUTINE HEALING, UNSPECIFIED PART OF CLAVICLE, SUBSEQUENT ENCOUNTER: Primary | ICD-10-CM

## 2018-09-28 PROCEDURE — 97110 THERAPEUTIC EXERCISES: CPT | Performed by: PHYSICAL THERAPIST

## 2018-09-28 NOTE — PROGRESS NOTES
Physical Therapy Daily Progress Note    Visit #: 6  Leroy Richardson reports: I continue to have pain when I force my arm above my head. I still get pain down my arm.   Pain Scale (0-10):     Subjective       Objective   See Exercise, Manual, and Modality Logs for complete treatment.     PROCEDURES AND MODALITIES:  Paraffin:   pre-rx  Moist Heat:    Ice:   post-rx  E-Stim:    Ultrasound:    Ionto:   Traction:    Dry Needling:         Therapy Exercise [id number] 32 minutes     Timed Code Treatment: 32 Minutes  Total Treatment Time: 34 Minutes    Assessment/Plan  AROM continues to be limited at endrange flex and abd. Due to nature of fracture this may improve minimally. Patient agrees with DC to HEP. Additional strengthening was added to HEP. Patient encouraged to return to PT if pain continues.     Other DC if patient does not return.       Kathie Chapa PT, DPT, CHT  Physical Therapist  [place] License # [id number]

## 2018-10-31 ENCOUNTER — OFFICE VISIT (OUTPATIENT)
Dept: ORTHOPEDIC SURGERY | Facility: CLINIC | Age: 73
End: 2018-10-31

## 2018-10-31 VITALS — WEIGHT: 159.2 LBS | BODY MASS INDEX: 22.29 KG/M2 | TEMPERATURE: 98.1 F | HEIGHT: 71 IN

## 2018-10-31 DIAGNOSIS — Z09 FRACTURE FOLLOW-UP: Primary | ICD-10-CM

## 2018-10-31 PROCEDURE — 99212 OFFICE O/P EST SF 10 MIN: CPT | Performed by: ORTHOPAEDIC SURGERY

## 2018-10-31 PROCEDURE — 73000 X-RAY EXAM OF COLLAR BONE: CPT | Performed by: ORTHOPAEDIC SURGERY

## 2018-10-31 NOTE — PROGRESS NOTES
Chief complaint: Follow-up status post closed treatment of right clavicle fracture    Mr. Richardson is 3 months out from his right shoulder injury.  He says that things are much better.  He still having soreness.  I get the impression that he is not entirely satisfied with his outcome at this point.    He has palpable callus over the fracture site.  No gross motion.  No tenderness over the clavicle.  He has good shoulder motion although admittedly does not quite full relative to the contralateral side.  Good strength with elevation and abduction.  I could not reproduce any pain on exam today.    AP and AP axial views of the right shoulder are ordered and reviewed.  These are compared to previous x-rays.  The clavicle fracture appears to be healing nicely.  There has been abundant interval callous formation.    Assessment: 3 months status post closed treatment of right clavicle fracture    Plan: He seems dissatisfied and I offered to refer him for further workup.  He declined that.  I think he is actually doing fine.  His residual soreness is to be expected.  I told him that it may take up to 6 months for full healing.  I think a lot of his soreness he is experiencing is from deconditioning and lack of use.  If he is still having problems in another few months, I want to see him back.  Otherwise, he can follow-up as needed.

## 2018-12-12 ENCOUNTER — LAB (OUTPATIENT)
Dept: LAB | Facility: HOSPITAL | Age: 73
End: 2018-12-12

## 2018-12-12 ENCOUNTER — TELEPHONE (OUTPATIENT)
Dept: ONCOLOGY | Facility: CLINIC | Age: 73
End: 2018-12-12

## 2018-12-12 ENCOUNTER — OFFICE VISIT (OUTPATIENT)
Dept: ONCOLOGY | Facility: CLINIC | Age: 73
End: 2018-12-12

## 2018-12-12 VITALS
BODY MASS INDEX: 22.85 KG/M2 | SYSTOLIC BLOOD PRESSURE: 174 MMHG | WEIGHT: 159.6 LBS | HEIGHT: 70 IN | HEART RATE: 71 BPM | TEMPERATURE: 98.2 F | OXYGEN SATURATION: 98 % | RESPIRATION RATE: 12 BRPM | DIASTOLIC BLOOD PRESSURE: 83 MMHG

## 2018-12-12 DIAGNOSIS — C82.08 GRADE 1 FOLLICULAR LYMPHOMA OF LYMPH NODES OF MULTIPLE REGIONS (HCC): Primary | ICD-10-CM

## 2018-12-12 DIAGNOSIS — R29.6 RECURRENT FALLS: ICD-10-CM

## 2018-12-12 DIAGNOSIS — I35.1 NONRHEUMATIC AORTIC VALVE INSUFFICIENCY: ICD-10-CM

## 2018-12-12 DIAGNOSIS — R94.31 ST SEGMENT DEPRESSION: Primary | ICD-10-CM

## 2018-12-12 PROBLEM — C85.90 LYMPHOMA, NON-HODGKIN'S (HCC): Status: ACTIVE | Noted: 2018-12-12

## 2018-12-12 LAB
ALBUMIN SERPL-MCNC: 4.5 G/DL (ref 3.5–5.2)
ALBUMIN/GLOB SERPL: 1.6 G/DL (ref 1.1–2.4)
ALP SERPL-CCNC: 78 U/L (ref 38–116)
ALT SERPL W P-5'-P-CCNC: 14 U/L (ref 0–41)
ANION GAP SERPL CALCULATED.3IONS-SCNC: 12 MMOL/L
AST SERPL-CCNC: 18 U/L (ref 0–40)
BASOPHILS # BLD AUTO: 0.03 10*3/MM3 (ref 0–0.1)
BASOPHILS NFR BLD AUTO: 0.4 % (ref 0–1.1)
BILIRUB SERPL-MCNC: 0.6 MG/DL (ref 0.1–1.2)
BUN BLD-MCNC: 20 MG/DL (ref 6–20)
BUN/CREAT SERPL: 23.5 (ref 7.3–30)
CALCIUM SPEC-SCNC: 9.6 MG/DL (ref 8.5–10.2)
CHLORIDE SERPL-SCNC: 98 MMOL/L (ref 98–107)
CO2 SERPL-SCNC: 25 MMOL/L (ref 22–29)
CREAT BLD-MCNC: 0.85 MG/DL (ref 0.7–1.3)
DEPRECATED RDW RBC AUTO: 42 FL (ref 37–49)
EOSINOPHIL # BLD AUTO: 0.13 10*3/MM3 (ref 0–0.36)
EOSINOPHIL NFR BLD AUTO: 1.7 % (ref 1–5)
ERYTHROCYTE [DISTWIDTH] IN BLOOD BY AUTOMATED COUNT: 13.5 % (ref 11.7–14.5)
GFR SERPL CREATININE-BSD FRML MDRD: 89 ML/MIN/1.73
GLOBULIN UR ELPH-MCNC: 2.8 GM/DL (ref 1.8–3.5)
GLUCOSE BLD-MCNC: 96 MG/DL (ref 74–124)
HCT VFR BLD AUTO: 42.3 % (ref 40–49)
HGB BLD-MCNC: 14.6 G/DL (ref 13.5–16.5)
IMM GRANULOCYTES # BLD: 0.06 10*3/MM3 (ref 0–0.03)
IMM GRANULOCYTES NFR BLD: 0.8 % (ref 0–0.5)
LYMPHOCYTES # BLD AUTO: 1.19 10*3/MM3 (ref 1–3.5)
LYMPHOCYTES NFR BLD AUTO: 15.4 % (ref 20–49)
MCH RBC QN AUTO: 29.4 PG (ref 27–33)
MCHC RBC AUTO-ENTMCNC: 34.5 G/DL (ref 32–35)
MCV RBC AUTO: 85.1 FL (ref 83–97)
MONOCYTES # BLD AUTO: 0.44 10*3/MM3 (ref 0.25–0.8)
MONOCYTES NFR BLD AUTO: 5.7 % (ref 4–12)
NEUTROPHILS # BLD AUTO: 5.89 10*3/MM3 (ref 1.5–7)
NEUTROPHILS NFR BLD AUTO: 76 % (ref 39–75)
NRBC BLD MANUAL-RTO: 0 /100 WBC (ref 0–0)
PLATELET # BLD AUTO: 221 10*3/MM3 (ref 150–375)
PMV BLD AUTO: 8.4 FL (ref 8.9–12.1)
POTASSIUM BLD-SCNC: 4.9 MMOL/L (ref 3.5–4.7)
PROT SERPL-MCNC: 7.3 G/DL (ref 6.3–8)
RBC # BLD AUTO: 4.97 10*6/MM3 (ref 4.3–5.5)
SODIUM BLD-SCNC: 135 MMOL/L (ref 134–145)
WBC NRBC COR # BLD: 7.74 10*3/MM3 (ref 4–10)

## 2018-12-12 PROCEDURE — 36415 COLL VENOUS BLD VENIPUNCTURE: CPT | Performed by: INTERNAL MEDICINE

## 2018-12-12 PROCEDURE — 99215 OFFICE O/P EST HI 40 MIN: CPT | Performed by: INTERNAL MEDICINE

## 2018-12-12 PROCEDURE — 80053 COMPREHEN METABOLIC PANEL: CPT | Performed by: INTERNAL MEDICINE

## 2018-12-12 PROCEDURE — 85025 COMPLETE CBC W/AUTO DIFF WBC: CPT | Performed by: INTERNAL MEDICINE

## 2018-12-12 NOTE — PROGRESS NOTES
REASONS FOR FOLLOWUP:     1.;    Remote history of non-Hodgkin lymphoma, in remission for more than 18 years. He received Rituxan at the time of his original diagnosis. No recurrence since then. As per 10/08/2015, the patient remains asymptomatic in regard to lymphoma with no fevers, chi  lls, night sweats, pruritus, changes in weight, performance status, peripheral adenopathy or rashes in the skin. Normal clinical examination. The patient will remain in observation.       HISTORY OF PRESENT ILLNESS: This patient returns today to the office for followup in company of his wife stating that his psychiatric illness has dramatically improved from the point of view of his major depression and he is being treated still for this entity. The major fear that he has though at this time is his fallibility because he has had falls with fracture of forearm and the collarbone on 2 or 3 different occasions during the last 2 years. He thinks that he loses balance because he is not paying attention to where he is going but he feels afraid of going back to work at the [employer] because of the potentiality for another event. He also has been referred by Willie Cox MD, his Cardiologist, to be seen by cardiac surgeon because of his aortic valve disease. He has a little bit more fatigue than usual but he has no shortness of breath. He admits that he is not exercising too much. He denies any paroxysmal nocturnal dyspnea, cardiac irregularity or angina. He denies any cough or shortness of breath. His appetite is very good. His weight is stable. His bowel function is appropriate and urination is appropriate. He rides on a static bicycle no more than 20 minutes once or twice a week and he does not do too much of any other exercise. He is becoming more sociable after the treatment of his depression. He still feels afraid and needs to be reassured by his wife persistently and constantly.     In regard to his previous  history of lymphoma, 20 years after the original diagnosis, no symptoms that suggest recurrence. Specifically no fevers, no chills, no sweats and no adenopathies.                PAST MEDICAL HISTORY:  The patient has had the common childhood illnesses and hemorrhoidectomy in 1976 and 1977.  In December of 1996 he had left axillary lymphadenopathy completely excise (4 cm).   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  No therapy was re  commended as she was LESLIE on careful staging workup. This included a negative bone marrow. Chest x-ray in 1998 showed a granuloma in the left upper lobe with calcified nodes in the left hilum.  He is PPD negative by history.          In June of 1997, he was admitted to [facility], [place], operated by [facility] by Dr. Thomason and seen in consultation by Dr. Tiwari with acute polyarthralgia and he was briefly treated with steroids.          In August of 1999, he had evidence of recurrent disease with mediastinal, axillary and mesenteric lymphadenopath  y.  Right axillary lymph node biopsy by Dr. Fernandez ([facility] #[id number]) demonstrated PDL nodular lymphoma. The flow cytometry confirmed the fact that he was CD20 positive.          A trial of Leukeran was initiated in August of 1999 without response documented in October.  Therapy with Rituxan initiated on 10/14/99 (LYM-5).  Subsequent scans have shown slow, steady response to Rituxan.     Most recent scans were in January 2001 showing further slight interval decrease in size of the lymph nodes.         Scans in July of 2002 showed no evidence of lymphadenopathy.  Repeat scans in July of 2003 are LESLIE.        Surveillance scans in December 2004 and again in July 2005 showed no evidence of disease.        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  PSA elevation  requiring probably reevaluation in  by Dr. Jefry Carballo.          Dr. Carballo performed prostate biopsy in 2007 with no evidence o  f malignancy.  Also in  Mr. Richardson underwent carotid Doppler scans showing no significant carotid stenosis and an echocardiogram showing some calcification of the aortic valve with mild aortic insufficiency.  He continues to followup with [facility] Cardiology.        MRI of brain in 2009 failed to document any major abnormalities.          CT scans of chest, abdomen, and pelvis failed to document any progression of his lymphoma as per 2009.           On 13 the patient states that he has been seen by Dr. Cox, his cardiologist, in regard his heart murmur and this has not changed overall and no changes to medications was advised.          He has been seen by his urologist and because he has a minor rise in his PSA to 5.1 the patient was advised to hav  e a new prostate biopsy and part of the material will be sent for new testing including DNA analysis that would include or exclude once and for all, being the third biopsy on his prostate, that he has or does not have prostate cancer.         HEMATOLOGIC/ONCOLOGIC HISTORY:  History from previous dates can be found in the separate document.         MEDICATIONS:  The current medication list was reviewed with the patient and updated in the EMR this date per the Medical Assistant.  Medication dosages and frequencies were confirmed to be accurate.        SOCIAL HISTORY: He is  and has a daughter. He is an .  He does not smoke or drink, an occasional beer.         FAMILY HISTORY: His father  at the age of 83, mother at the age of 72; both of heart failure.  He has two brothers and a sister. An aunt was treated for lymphoma.         REVIEW OF SYSTEMS:     General: no fever, no chills, STATED fatigue,no weight changes, no lack of  appetite.  Eyes: no epiphora, xerophthalmia,conjunctivitis, pain, glaucoma, blurred vision, blindness, secretion, photophobia, proptosis, diplopia.  Ears: no otorrhea, tinnitus, otorrhagia, deafness, pain, vertigo.  Nose: no rhinorrhea, no epistaxis, no alteration in perception of odors, no sinuses pressure.  Mouth: no alteration in gums or teeth,  No ulcers, no difficulty with mastication or deglut ion, no odynophagia.  Neck: no masses or pain, no thyroid alterations, no pain in muscles or arteries, no carotid odynia, no crepitation.  Respiratory: no cough, no sputum production,no dyspnea,no trepopnea, no pleuritic pain,no hemoptysis.  Heart: no syncope, no irregularity, no palpitations, no angina,no orthopnea,no paroxysmal nocturnal dyspnea.  Vascular Venous: no tenderness,no edema,no palpable cords,no postphlebitic syndrome, no skin changes no ulcerations.  Vascular Arterial: no distal ischemia, noclaudication, no gangrene, no neuropathic ischemic pain, no skin ulcers, no paleness no cyanosis.  GI: no dysphagia, no odynophagia, no regurgitation, no heartburn,no indigestion,no nausea,no vomiting,no hematemesis ,no melena,no jaundice,no distention, no obstipation,no enterorrhagia,no proctalgia,no anal  lesions, no changes in bowel habits.  : no frequency, no hesitancy, no hematuria, no discharge,no  pain.  Musculoskeletal: no muscle or tendon pain or inflammation,no  joint pain, no edema, no functional limitation,no fasciculations, no mass.  Neurologic: no headache, no seizures, noalterations on Craneal nerves, no motor deficit, no sensory deficit, normal coordination, no alteration in memory,normal orientation, calculation,normal writting, verbal and written language.TENDENCY TO FALL , NO VERTIGO, BLIND ON LEFT EYE, INSECURE IN HIS GATE  Skin: no rashes,no pruritus no localized lesions.  Psychiatric: no anxiety, MUCH BETTER depression,no agitation, no delusions, proper insight.        VITAL SIGNS:  Vitals:     "18 [facility]   BP: 174/83   Pulse: 71   Resp: 12   Temp: 98.2 °F (36.8 °C)   TempSrc: Oral   SpO2: 98%   Weight: 72.4 kg (159 lb 9.6 oz)   Height: 178 cm (70.08\")          PAIN:  0 out of 10      ECO         PHYSICAL EXAMINATION:   GENERAL:  Well-developed, well-nourished  Patient  in no acute distress.   SKIN:  Warm, dry ,NO rashes,NO purpura ,NO petechiae.  HEENT:  Pupils were equal and reactive to light and accomodation, conjunctivas non injected, no pterigion, normal extraocular movements, normal visual acuity.   Mouth mucosa was moist, no exudates in oropharynx, normal gum line, normal roof of the mouth and pillars, normal papillations of the tongue.  NECK:  Supple with good range of motion; no thyromegaly or masses, no JVD or bruits, no cervical adenopathies.No carotid arteries pain, no carotid abnormal pulsation , NO arterial dance.  LYMPHATICS:  No cervical, NO supraclavicular, NO axillary,NO epitrochlear , NO inguinal adenopathy.  CHEST:  Normal excursion of both holland thoraces, normal voice fremitus, no subcutaneous emphysema, normal axillas, no rashes or acanthosis nigricans. Lungs clear to percussion and auscultation, normal breath sounds bilaterally, no wheezing,NO crackles NO ronchi, NO stridor, NO rubs.  CARDIAC AND VASCULAR:  normal rate and regular rhythm, with BASAL SYSTOLIC GRADE 3/6 AORTIC murmurs,NO rubs NO S3 NO S4 right or left . Normal femoral, popliteal, pedis, brachial and carotid pulses.  ABDOMEN:  Soft, nontender with no organomegaly or masses, no ascites, no collateral circulation,no distention,no [place] sign, no abdominal pain, no inguinal hernias,no umbilical hernia, no abdominal bruits. Normal bowel sounds.  GENITAL: Not  Performed.  EXTREMITIES  AND SPINE:  No clubbing, cyanosis or edema, no deformities or pain .No kyphosis, scoliosis, deformities or pain in spine, ribs or pelvic bone.  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 The patient has no nystagmus. His hearing was appropriate. He has no vertigo. He has no other alteration in cranial nerves. His motor exam discloses strength of 5/5 in upper and lower extremities. His balance upon standing up was appropriate. His gait was appropriate and he had no difficulty with coordination. Finger-to-nose was appropriate. He had no obvious inability to walk on his heels or his tiptoes. He has a negative Romberg test. He had no clonus. He had no Babinski and he had no obvious sensory deficit as far as I can tell in multiple modalities. Vibratory sensation was present in upper and lower extremities in a normal fashion.            LABORATORY DATA:  Differential   Order: [id number] - Part of Panel Order [id number]   Status:  Final result   Visible to patient:  No (Not Released) Dx:  ST segment depression    Ref Range & Units 09:51   WBC 4.00 - 10.00 10*3/mm3 7.74    RBC 4.30 - 5.50 10*6/mm3 4.97    Hemoglobin 13.5 - 16.5 g/dL 14.6    Hematocrit 40.0 - 49.0 % 42.3    MCV 83.0 - 97.0 fL 85.1    MCH 27.0 - 33.0 pg 29.4    MCHC 32.0 - 35.0 g/dL 34.5    RDW 11.7 - 14.5 % 13.5    RDW-SD 37.0 - 49.0 fl 42.0    MPV 8.9 - 12.1 fL 8.4 Abnormally low     Platelets 150 - 375 10*3/mm3 221    Neutrophil % 39.0 - 75.0 % 76.0 Abnormally high     Lymphocyte % 20.0 - 49.0 % 15.4 Abnormally low     Monocyte % 4.0 - 12.0 % 5.7    Eosinophil % 1.0 - 5.0 % 1.7    Basophil % 0.0 - 1.1 % 0.4    Immature Grans % 0.0 - 0.5 % 0.8 Abnormally high     Neutrophils, Absolute 1.50 - 7.00 10*3/mm3 5.89    Lymphocytes, Absolute 1.00 - 3.50 10*3/mm3 1.19    Monocytes, Absolute 0.25 - 0.80 10*3/mm3 0.44    Eosinophils, Absolute 0.00 - 0.36 10*3/mm3 0.1           DATE OF EXAM: 11/15/2018  12:50  EXAMINATION(S): ECHO DOPPLER 2D MMODE SPECT COLOR COMPLETE    FINAL REPORT    Procedure  Type of Study     TTE procedure:ECHO DOPPLER 2D MMODE  SPECT COLOR COMPLETE.    Clinical Indications:Aortic stenosis.    Height: 180 cmWeight: 72 kgBSA: 1.91 m^2 BMI: 22.22 kg/m^2    BP: 104/70 mmHg     Conclusions     Summary   Mild tricuspid regurgitation.   The ejection fraction biplane was calculated at 55%.   Mild left ventricular hypertrophy.   Overall left ventricular function is normal.   Abnormal left ventricular diastolic filling consistent with impaired   relaxation, grade 1A.   Mild to moderate aortic stenosis is present.   Mild-to-moderate aortic regurgitation is noted.   Mild to moderate dilation of the aortic root.   Moderate dilatation of the ascending aorta.     Any valve disease noted in the report is non-rheumatic unless otherwise   specifically noted.     Findings     Left Ventricle   The ejection fraction biplane was calculated at 55%.   Left ventricle size is normal.   Mild left ventricular hypertrophy.   Overall left ventricular function is normal.   The estimated ejection fraction is 55-60%.   Abnormal left ventricular diastolic filling consistent with impaired   relaxation.     Left Atrium   The left atrial chamber size appears normal.     Right Ventricle   The right ventricular chamber size and systolic function are within normal   limits.     Right Atrium   The right atrial chamber size appears normal.     Aortic Valve   The peak instantaneous gradient of the aortic valve is 31 mmHg. The mean   gradient of the aortic valve is 19 mmHg. The aortic valve area by VTI, is   calculated at 1.6 cm2.   Mild to moderate aortic stenosis is present.   Mild-to-moderate aortic regurgitation is noted.   Aortic insufficiency pressure half-time 450 msec.   Tricuspid aortic valve with diffuse thickening wuth reduced excursion.     Mitral Valve   The mitral valve appears normal in structure and function. There is no   evidence of mitral regurgitation.   No mitral stenosis.     Tricuspid Valve   Right ventricular systolic pressure of 26 mmHg.   Tricuspid valve is  structurally normal.   No evidence of tricuspid stenosis.   Mild tricuspid regurgitation.     Pulmonic Valve   The pulmonic valve appears normal in structure and function. No significant   pulmonic insufficiency.   No evidence of pulmonic valve stenosis.     Pericardium   No evidence of pericardial effusion.     Great Vessels   Mild to moderate dilation of the aortic root. Moderate dilatation of the   ascending aorta.   EXAMINATION:     1. CT chest without intravenous contrast.    DATE: 11/27/2018        HISTORY: ascending aortic aneurysm.        TECHNIQUE: Multislice helical chest protocol without intravenous contrast per referring physician request. There are no prior studies.    Dose reduction techniques were employed per ALARA protocol.      FINDINGS: There is mild fusiform aneurysmal dilation of the ascending aorta measuring 4.5 cm maximum. There is tapering through the arch which is kinked distally. Maximum diameter of the proximal descending segment measures 3.8 cm with tapering to normal   thereafter. There is moderate atherosclerotic aortic calcification.    The heart size is normal. There is coronary artery calcification. There are no effusions. There is no significant adenopathy though there are calcified nodes from old granulomatous disease. There are scattered calcified granulomata in the lungs along   with mild biapical scarring. There are punctate peribronchial tree-in-bud nodular infiltrates in the posterior aspect of the left upper lobe. There is multifocal minor atelectasis. The tracheobronchial tree is unremarkable.    The visualized upper abdomen demonstrates right renal cysts. There are no acute bone abnormalities.    IMPRESSION:  1. Fusiform aneurysmal dilation of the ascending aorta measuring 4.5 cm maximum with tapering through the arch and proximal descending aorta.  2. Coronary artery atherosclerosis.  3. Small tree-in-bud peribronchial nodular infiltrates in the left upper lobe consistent  with acute or chronic bronchiolar infection/inflammation.    Dictated by: Leroy Shane M.D.    Images and Report reviewed and interpreted by: Leroy Shane M.D.    <PS><Electronically signed by: Leroy Shane M.D.>  11/27/2018 [id number]      ASSESSMENT/PLAN:  1. This patient has history of follicular lymphoma that was treated with Rituxan 20 years ago. Since he achieved remission, he has not required any other intervention and today he has no symptoms or radiological findings or alterations in the laboratory parameters that would suggest any lymphoma recurrence. Again this is 20 years after the original diagnosis.   2. Psychiatric illness. The patient had major depression requiring aggressive psychiatric therapy at the [facility]. Since the previous visit, he has substantially improved. He is now more social and he is able to leave the house without the fears. He has no obsessive ideas anymore and he feels safer in different environments.   3. The patient has had frequent falls. He has fracture of the right clavicle and fracture of the left forearm after 2 of these events and he feels extremely unsafe walking around. His neurological examination is not very conclusive in regard to what the deficit is. I wonder if he has visual field perception abnormalities given the fact that he is only working with the right eye given the vascular event he had while taking Viagra, losing vision completely in his left eye and he never recovered. Therefore that raises the question if the patient needs to have some proper assessment by physical therapy for this and maybe some prevention of falls would be appropriate and also teaching him how to fall in the long run could have substantial benefit.   3. The patient’s physical activity is minimal. He does a recumbent bicycle at lowest speed no more than 20 minutes, no more than once a week. To me there is not too much of activity here and he has lost muscle mass and I think part of his  balance issues is related to this. I referred him to be seen by physical therapy, do a complete assessment, recommend exercises and recommend fall prevention besides teaching him how to fall in case that these events happen again which most likely will be the case.   4. The patient brought records from [facility] in regard to his echocardiogram. We have known for a long time that he has aortic valve disease, nonrheumatic. I do not believe that he has any symptoms pertinent to this. The echocardiogram shows abnormalities stated above and Dr. Cox has recommended for him to be seen by cardiothoracic surgeon. I do believe that this visit is appropriate but I am not sure that any symptoms that he has now will be consequential from the point of view of this process at this time. His heart murmur has not changed in nature at least for the last 7 days when I have been listening to him periodically.   5. The patient has had a new more recent CT scan of the chest also at [facility] that documented minimal fluffy infiltrate, peribronchial on the left lung. He has had some bronchitis. This is very much over. He has no symptoms or issues pertinent to this at this time and this will have no implications from my point of view. He has no mediastinal adenopathy. No cardiomegaly. No pleural effusions.     Therefore, over all these things, I do believe that physical activity is the most important issue of all. Fall prevention and fall assessment is another one and he can follow up with the rest of his physicians in regard to all this other testing performed and discussed.     I discussed all these facts with the patient and his wife in detail.     He will be called at home with the report of his chemistry profile once that this becomes available. In the past, this has been normal.     I will review him back in 1 year with a CBC and CMP.

## 2018-12-14 ENCOUNTER — HOSPITAL ENCOUNTER (OUTPATIENT)
Dept: PHYSICAL THERAPY | Facility: HOSPITAL | Age: 73
Setting detail: THERAPIES SERIES
Discharge: HOME | End: 2018-12-14
Attending: INTERNAL MEDICINE

## 2018-12-14 DIAGNOSIS — R42 DIZZINESS: Primary | ICD-10-CM

## 2018-12-14 DIAGNOSIS — R29.6 RECURRENT FALLS: ICD-10-CM

## 2018-12-14 DIAGNOSIS — G44.86 CERVICOGENIC HEADACHE: ICD-10-CM

## 2018-12-14 PROCEDURE — G8978 MOBILITY CURRENT STATUS: HCPCS | Performed by: PHYSICAL THERAPIST

## 2018-12-14 PROCEDURE — 97530 THERAPEUTIC ACTIVITIES: CPT | Performed by: PHYSICAL THERAPIST

## 2018-12-14 PROCEDURE — G8979 MOBILITY GOAL STATUS: HCPCS | Performed by: PHYSICAL THERAPIST

## 2018-12-14 PROCEDURE — 97162 PT EVAL MOD COMPLEX 30 MIN: CPT | Performed by: PHYSICAL THERAPIST

## 2018-12-17 NOTE — THERAPY EVALUATION
"    Outpatient Physical Therapy Vestibular Initial Evaluation  [facility]     Patient Name: Leroy Richardson  : 1945  MRN: [id number]  Today's Date: 2018      Visit Date: 2018    Patient Active Problem List   Diagnosis   • Disorder of aorta (CMS/HCC)   • Hypertension   • Sleep apnea   • Aortic valve insufficiency   • ST segment depression   • Nonrheumatic aortic valve stenosis   • Paroxysmal atrial fibrillation (CMS/HCC)   • Cervicogenic headache   • Grade 1 follicular lymphoma of lymph nodes of multiple regions (CMS/HCC)        Past Medical History:   Diagnosis Date   • Anxiety    • Aortic insufficiency     Mild   • Atrial fibrillation (CMS/HCC)    • Depression    • Heart murmur    • Hypertension    • Lymphoma, non-Hodgkin's (CMS/HCC)    • Nonrheumatic aortic (valve) insufficiency    • Nonrheumatic aortic (valve) stenosis    • PAF (paroxysmal atrial fibrillation) (CMS/HCC)    • Polyarthralgia 1997   • Sleep apnea    • Stroke (CMS/HCC)     eye        Past Surgical History:   Procedure Laterality Date   • AXILLARY LYMPH NODE BIOPSY/EXCISION Left 1996   • CATARACT EXTRACTION     • HEMORRHOIDECTOMY  ,    • PROSTATE BIOPSY  2007         Visit Dx:     ICD-10-CM ICD-9-CM   1. Dizziness R42 780.4   2. Recurrent falls R29.6 V15.88   3. Cervicogenic headache R51 784.0       Patient History     Row Name 18 1400             History    Chief Complaint  Balance Problems  -GR      Date Current Problem(s) Began  17  -GR      Brief Description of Current Complaint  \"I have cervical vertigo. Did PT years ago, graduated and now it's back. I'm falling, a number of times in the last year; he is not sure why I falls.\"  He states he has injured himself several times including a L humerus fx and R collar bone fx. 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  He reports his vertigo feels like he is being pushed from the back " or side of his head when walking which creates a sense of imbalance.  If he sits or lays down it will go away.  He will go to the gym or walk maybe once a week. He is also dealing with sleep apnea. 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 He does not use AD.  -GR      Patient/Caregiver Goals  Know what to do to help the symptoms;Relief from dizziness  -GR         Pain     Pain Location  -- denies pain  -GR         Fall Risk Assessment    Any falls in the past year:  Yes  -GR      Number of falls reported in the last 12 months  4  -GR      Factors that contributed to the fall:  Lost balance;Tripped  -GR         Services    Do you plan to receive Home Health services in the near future  No  -GR         Daily Activities    Primary Language  English  -GR      Pt Participated in POC and Goals  Yes  -GR         Safety    Are you being hurt, hit, or frightened by anyone at home or in your life?  No  -GR      Are you being neglected by a caregiver  No  -GR        User Key  (r) = Recorded By, (t) = Taken By, (c) = Cosigned By    Initials Name Provider Type    GR Mauri Duran, PT Physical Therapist                          Therapy Education  Education Details: role of outpatient PT, vestibular anatomy, tripart balance system, initial HEP and expectations(10' theract- extensive review)  Given: HEP  Program: New  How Provided: Verbal, Demonstration, Written  Provided to: Patient  Level of Understanding: Teach back education performed                      PT OP Goals     Row Name 12/17/18 1000          PT Short Term Goals    STG Date to Achieve  12/29/18  -GR     STG 1  Patient will be independent wtih initial HEP.  -GR     STG 1 Progress  New  -GR     STG 2  Patient will deny falls.  -GR     STG 2 Progress  New  -GR     STG 3  Patient will report use of stepping strategy for home safety.  -GR     STG 3 Progress  New  -GR        Long Term Goals    LTG Date  to Achieve  01/28/19  -GR     LTG 1  Patient will score >/=90/120 on the modified CTSIB to indicate improved safety on varying surfaces.  -GR     LTG 1 Progress  New  -GR     LTG 2  Patient will be independent with adaptive techniques for community safety.  -GR     LTG 2 Progress  New  -GR     LTG 3  Patient will score </=10/100 on the dizziness handicap inventory to indicate improved perceived positional tolerance.  -GR     LTG 3 Progress  New  -GR        Time Calculation    PT Goal Re-Cert Due Date  03/14/19  -GR       User Key  (r) = Recorded By, (t) = Taken By, (c) = Cosigned By    Initials Name Provider Type    GR Mauri Duran, PT Physical Therapist          PT Assessment/Plan     Row Name 12/17/18 9935          PT Assessment    Functional Limitations  Decreased safety during functional activities;Limitations in community activities;Limitations in functional capacity and performance;Performance in leisure activities  -GR     Impairments  Balance;Posture;Muscle strength  -GR     Assessment Comments  72 y/o M referred to outpatient PT with worsening balance, h/o falls, h/o vertigo. Signs and symptoms are consistent with vestibular hypofunction. Pertinent comorbidities include h/o cervicogenic vertigo, HTN, ocular embolism, CA.  His condition is evolving and he remains a high fall risk as with modified CTSIB test 71/120 where 120/120 = no fault.  Recommend skilled PT to address his functional deficits. Thank you for this referral.  -GR     Please refer to paper survey for additional self-reported information  Yes  -GR     Rehab Potential  Good  -GR     Patient/caregiver participated in establishment of treatment plan and goals  Yes  -GR     Patient would benefit from skilled therapy intervention  Yes  -GR        PT Plan    PT Frequency  1x/week;2x/week  -GR     Predicted Duration of Therapy Intervention (Therapy Eval)  1-2x/week for 4-6 weeks  -GR     Planned CPT's?  PT EVAL MOD COMPLELITY: 18230;PT RE-EVAL:  08360;PT THER PROC EA 15 MIN: 40737;PT THER ACT EA 15 MIN: 98557;PT NEUROMUSC RE-EDUCATION EA 15 MIN: 47135;PT GAIT TRAINING EA 15 MIN: 93323;PT HOT OR COLD PACK TREAT MCARE  -GR     Physical Therapy Interventions (Optional Details)  balance training;gait training;home exercise program;neuromuscular re-education;patient/family education;postural re-education;strengthening;vestibular training  -GR     PT Plan Comments  Begin with HEP review.  Progress to compliant surfaces in bars. Consider lower body/core strengthening program.  -GR       User Key  (r) = Recorded By, (t) = Taken By, (c) = Cosigned By    Initials Name Provider Type    Mauri Mckay, PT Physical Therapist           Outcome Measure Options: Other Outcome Measure(modified CTSIB 71/120)         Time Calculation:   Start Time: 1430  Stop Time: 1515  Time Calculation (min): 45 min  Total Timed Code Minutes- PT: 10 minute(s)   Therapy Suggested Charges     Code   Minutes Charges    None               PT G-Codes  PT Professional Judgement Used?: Yes  Outcome Measure Options: Other Outcome Measure(modified CTSIB 71/120)  Functional Limitation: Mobility: Walking and moving around  Mobility: Walking and Moving Around Current Status (): At least 40 percent but less than 60 percent impaired, limited or restricted  Mobility: Walking and Moving Around Goal Status (): At least 1 percent but less than 20 percent impaired, limited or restricted         Mauri Duran, PT  12/17/2018

## 2018-12-19 ENCOUNTER — APPOINTMENT (OUTPATIENT)
Dept: PHYSICAL THERAPY | Facility: HOSPITAL | Age: 73
End: 2018-12-19

## 2018-12-28 ENCOUNTER — HOSPITAL ENCOUNTER (OUTPATIENT)
Dept: PHYSICAL THERAPY | Facility: HOSPITAL | Age: 73
Setting detail: THERAPIES SERIES
Discharge: HOME | End: 2018-12-28

## 2018-12-28 DIAGNOSIS — G44.86 CERVICOGENIC HEADACHE: ICD-10-CM

## 2018-12-28 DIAGNOSIS — R29.6 RECURRENT FALLS: Primary | ICD-10-CM

## 2018-12-28 DIAGNOSIS — R42 DIZZINESS: ICD-10-CM

## 2018-12-28 PROCEDURE — 97112 NEUROMUSCULAR REEDUCATION: CPT | Performed by: PHYSICAL THERAPIST

## 2018-12-28 PROCEDURE — 97110 THERAPEUTIC EXERCISES: CPT | Performed by: PHYSICAL THERAPIST

## 2019-01-02 ENCOUNTER — HOSPITAL ENCOUNTER (OUTPATIENT)
Dept: PHYSICAL THERAPY | Facility: HOSPITAL | Age: 74
Setting detail: THERAPIES SERIES
Discharge: HOME | End: 2019-01-02

## 2019-01-02 DIAGNOSIS — R29.6 RECURRENT FALLS: Primary | ICD-10-CM

## 2019-01-02 DIAGNOSIS — R42 DIZZINESS: ICD-10-CM

## 2019-01-02 DIAGNOSIS — G44.86 CERVICOGENIC HEADACHE: ICD-10-CM

## 2019-01-02 PROCEDURE — 97112 NEUROMUSCULAR REEDUCATION: CPT

## 2019-01-02 PROCEDURE — 97110 THERAPEUTIC EXERCISES: CPT

## 2019-01-02 NOTE — THERAPY TREATMENT NOTE
Outpatient Physical Therapy Ortho Treatment Note  [facility]     Patient Name: Leroy Richardson  : 1945  MRN: [id number]  Today's Date: 2019      Visit Date: 2019    Visit Dx:    ICD-10-CM ICD-9-CM   1. Recurrent falls R29.6 V15.88   2. Dizziness R42 780.4   3. 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 (CMS/HCC)        Past Medical History:   Diagnosis Date   • Anxiety    • Aortic insufficiency     Mild   • Atrial fibrillation (CMS/HCC)    • Depression    • Heart murmur    • Hypertension    • Lymphoma, non-Hodgkin's (CMS/HCC)    • Nonrheumatic aortic (valve) insufficiency    • Nonrheumatic aortic (valve) stenosis    • PAF (paroxysmal atrial fibrillation) (CMS/HCC)    • Polyarthralgia 1997   • Sleep apnea    • Stroke (CMS/HCC)     eye        Past Surgical History:   Procedure Laterality Date   • AXILLARY LYMPH NODE BIOPSY/EXCISION Left 1996   • CATARACT EXTRACTION     • HEMORRHOIDECTOMY  ,    • PROSTATE BIOPSY  2007       PT Ortho     Row Name 19 1000       MMT (Manual Muscle Testing)    General MMT Comments  tested hip abd in SL: R 4/5, L 3+/5  -REBECCA      User Key  (r) = Recorded By, (t) = Taken By, (c) = Cosigned By    Initials Name Provider Type    Karine Mckeon, PT Physical Therapist                      PT Assessment/Plan     Row Name 19 1301          PT Assessment    Assessment Comments  Pt required use of hands intermittently during balance and gait work.  Tested hip abduction in SL after pt c/o soreness/pain and demonstrated decreased stability on L in particular and found L glute med weakness.  Progressed HEP.   Monitored pt's response during therapy to avoid irritating his cervical symptoms.  -JA        PT Plan    PT Plan Comments  assess response to new ex's  -JA       User Key  (r) = Recorded By, (t) = Taken By, (c) = Cosigned By    Initials Name Provider Type    Karine Mckeon, PT Physical Therapist              Exercises     Row Name 01/02/19 1000             Subjective Comments    Subjective Comments  Pt reports no falls.  -JA         Subjective Pain    Able to rate subjective pain?  yes  -JA      Pre-Treatment Pain Level  0  -JA         Total Minutes    60932 - PT Therapeutic Exercise Minutes  20  -JA      88697 -  PT Neuromuscular Reeducation Minutes  25  -JA         Exercise 1    Exercise Name 1  Nustep  -JA      Time 1  5 minutes  -JA      Additional Comments  UE/LE L4  -JA         Exercise 2    Exercise Name 2  Rhomberg  -JA      Cueing 2  Demo  -JA      Sets 2  1  -JA      Reps 2  3  -JA      Time 2  30 seconds  -JA      Additional Comments  floor  -JA         Exercise 3    Exercise Name 3  Tandem  -JA      Cueing 3  Demo  -JA      Sets 3  1  -JA      Reps 3  3  -JA      Time 3  30 seconds  -JA      Additional Comments  eyes open and closed  -JA         Exercise 4    Exercise Name 4  SLS   -JA      Cueing 4  Demo  -JA      Sets 4  1  -JA      Reps 4  3  -JA      Time 4  10 seconds  -JA         Exercise 5    Exercise Name 5  Heel and toe raises  -JA      Cueing 5  Demo  -JA      Sets 5  2 (one set ea)  -JA      Reps 5  10  -JA         Exercise 6    Exercise Name 6  HS curls standing  -JA      Cueing 6  --  -JA      Sets 6  --  -JA      Reps 6  --  -JA         Exercise 7    Exercise Name 7  sidestepping with tband around knees  -JA      Cueing 7  Demo  -JA      Sets 7  4  -JA      Reps 7  10'  -JA      Additional Comments  RTB  -JA         Exercise 8    Exercise Name 8  Rows standing  -JA      Cueing 8  Demo  -JA      Sets 8  2  -JA      Reps 8  10  -JA      Additional Comments  RTB  -JA         Exercise 9     Exercise Name 9  SLS stair taps  -JA      Cueing 9  Demo  -JA      Reps 9  20  -JA         Exercise 10    Exercise Name 10  LAQ  -JA      Cueing 10  Demo  -JA      Sets 10  2  -JA      Reps 10  10  -JA      Time 10  5 seconds  -JA      Additional Comments  2#  -JA         Exercise 11    Exercise Name 11  Giant steps and Tandem gait  -JA      Sets 11  4 reps ea  -JA      Reps 11  10'   -JA         Exercise 12    Exercise Name 12  supine hip abd (windshield wiper)  -JA      Reps 12  5  -JA      Additional Comments  towel under heel  -JA         Exercise 13    Exercise Name 13  hip add w/ball  -JA      Reps 13  10  -JA      Additional Comments  isolate L then R   -JA         Exercise 14    Exercise Name 14  elongation stretch  -JA      Reps 14  3  -JA         Exercise 15    Exercise Name 15  Instructed in HEP for Sidestepping, rev shld rolls, scap ret and chin tuck  -JA        User Key  (r) = Recorded By, (t) = Taken By, (c) = Cosigned By    Initials Name Provider Type    Karine Mckeon, PT Physical Therapist                             Therapy Education  Education Details: Discussed the importance of strength and balance. Added hip strengthening to gym program.  Given: HEP, Symptoms/condition management, Pain management, Posture/body mechanics  Program: Reinforced, Progressed  How Provided: Verbal, Demonstration, Written  Provided to: Patient  Level of Understanding: Teach back education performed              Time Calculation:   Start Time: 1000  Stop Time: 1045  Time Calculation (min): 45 min  Therapy Suggested Charges     Code   Minutes Charges    73903 (CPT®) Hc Pt Neuromusc Re Education Ea 15 Min 25 2    93394 (CPT®) Hc Pt Ther Proc Ea 15 Min 20 1    07523 (CPT®) Hc Gait Training Ea 15 Min      28225 (CPT®) Hc Pt Therapeutic Act Ea 15 Min      08882 (CPT®) Hc Pt Manual Therapy Ea 15 Min      14980 (CPT®) Hc Pt Ther Massage- Per 15 Min      83506 (CPT®) Hc Pt Iontophoresis Ea 15 Min      40285 (CPT®) Hc  Pt Elec Stim Ea-Per 15 Min      52016 (CPT®) Hc Pt Ultrasound Ea 15 Min      36252 (CPT®) Hc Pt Self Care/Mgmt/Train Ea 15 Min      76386 (CPT®) Hc Pt Prosthetic (S) Train Initial Encounter, Each 15 Min      46747 (CPT®) Hc Orthotic(S) Mgmt/Train Initial Encounter, Each 15min      88780 (CPT®) Hc Pt Aquatic Therapy Ea 15 Min      41091 (CPT®) Hc Pt Orthotic(S)/Prosthetic(S) Encounter, Each 15 Min       (CPT®) Hc Pt Electrical Stim Unattended      Total  45 3        Therapy Charges for Today     Code Description Service Date Service Provider Modifiers Qty    24489739652 HC PT NEUROMUSC RE EDUCATION EA 15 MIN 1/2/2019 Karine Freitas, PT GP 2    02455158189 HC PT THER PROC EA 15 MIN 1/2/2019 Karine Freitas, PT GP 1                    Karine Freitas, PT  1/2/2019

## 2019-01-09 ENCOUNTER — HOSPITAL ENCOUNTER (OUTPATIENT)
Dept: PHYSICAL THERAPY | Facility: HOSPITAL | Age: 74
Setting detail: THERAPIES SERIES
Discharge: HOME | End: 2019-01-09

## 2019-01-09 DIAGNOSIS — R42 DIZZINESS: ICD-10-CM

## 2019-01-09 DIAGNOSIS — R29.6 RECURRENT FALLS: Primary | ICD-10-CM

## 2019-01-09 DIAGNOSIS — G44.86 CERVICOGENIC HEADACHE: ICD-10-CM

## 2019-01-09 PROCEDURE — 97110 THERAPEUTIC EXERCISES: CPT | Performed by: PHYSICAL THERAPIST

## 2019-01-09 PROCEDURE — 97112 NEUROMUSCULAR REEDUCATION: CPT | Performed by: PHYSICAL THERAPIST

## 2019-01-09 NOTE — THERAPY TREATMENT NOTE
Outpatient Physical Therapy Vestibular Progress Note  [facility]     Patient Name: Leroy Richardson  : 1945  MRN: [id number]  Today's Date: 2019      Visit Date: 2019    Visit Dx:     ICD-10-CM ICD-9-CM   1. Recurrent falls R29.6 V15.88   2. Dizziness R42 780.4   3. 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 He declined seated rest breaks on this visit.  His dizziness is well controlled.  -GR        PT Plan    PT Plan Comments  Continue POC.  -GR       User Key  (r) = Recorded By, (t) = Taken By, (c) = Cosigned By    Initials Name Provider Type    Mauri Mckay, PT Physical Therapist               Exercises     Row Name 19 1000             Subjective Comments    Subjective Comments  States he talked to his PCP who stated he was in agreement with Dr. Noel good and happy to sign off on PT in the future if needed.  He denies falls since last session and reports HEP compliance is helping with balance and neck pain. Does have some questions about the cervical exercises.  -GR         Subjective Pain    Able to rate subjective pain?  yes  -GR      Pre-Treatment Pain Level  0  -GR         Total Minutes    83118 - PT Therapeutic Exercise Minutes  21  -GR      54595 -  PT Neuromuscular Reeducation Minutes  24  -GR         Exercise 1    Exercise Name 1  Nustep  -GR      Time 1  5 minutes  -GR      Additional Comments  UE/LE L5  -GR          "Exercise 2    Exercise Name 2  Rhomberg  -GR      Cueing 2  Demo  -GR      Sets 2  1  -GR      Reps 2  3  -GR      Time 2  30 seconds  -GR      Additional Comments  airex  -GR         Exercise 3    Exercise Name 3  Semi tandem  -GR      Cueing 3  Demo  -GR      Sets 3  1  -GR      Reps 3  3  -GR      Time 3  30 seconds  -GR      Additional Comments  airex- eyes open, closed, perturbations  -GR         Exercise 7    Exercise Name 7  sidestepping with tband around knees  -GR      Cueing 7  Demo  -GR      Sets 7  6  -GR      Reps 7  10'  -GR      Additional Comments  RTB at knees  -GR         Exercise 8    Exercise Name 8  Rows standing  -GR      Cueing 8  Demo  -GR      Sets 8  2  -GR      Reps 8  10  -GR      Additional Comments  RTB  -GR         Exercise 9    Exercise Name 9  SLS BOSU taps  -GR      Cueing 9  Demo  -GR      Reps 9  20  -GR         Exercise 12    Exercise Name 12  h/l clam  -GR      Cueing 12  Demo  -GR      Reps 12  20  -GR      Additional Comments  RTB  -GR         Exercise 13    Exercise Name 13  h/l add squeeze ball  -GR      Cueing 13  Demo  -GR      Sets 13  2  -GR      Reps 13  10  -GR         Exercise 14    Exercise Name 14  UT stretch seated  -GR      Sets 14  1  -GR      Reps 14  3  -GR      Time 14  20 seconds  -GR         Exercise 15    Exercise Name 15  c/spine retraction seated  -GR      Cueing 15  Demo  -GR      Sets 15  1  -GR      Reps 15  10  -GR      Time 15  3 sec  -GR      Additional Comments  taught \"#1 rule\" for neutral spine  -GR         Exercise 16    Exercise Name 16  tandem beam walk  -GR      Reps 16  4 laps  -GR      Additional Comments  fingertips B  -GR        User Key  (r) = Recorded By, (t) = Taken By, (c) = Cosigned By    Initials Name Provider Type    GR Mauri Duran, PT Physical Therapist                      PT OP Goals     Row Name 01/09/19 1200          PT Short Term Goals    STG Date to Achieve  12/29/18  -GR     STG 1  Patient will be independent wtih " "initial HEP.  -GR     STG 1 Progress  Met  -GR     STG 2  Patient will deny falls.  -GR     STG 2 Progress  Met  -GR     STG 3  Patient will report use of stepping strategy for home safety.  -GR     STG 3 Progress  Ongoing  -GR        Long Term Goals    LTG Date to Achieve  01/28/19  -GR     LTG 1  Patient will score >/=90/120 on the modified CTSIB to indicate improved safety on varying surfaces.  -GR     LTG 1 Progress  Ongoing  -GR     LTG 2  Patient will be independent with adaptive techniques for community safety.  -GR     LTG 2 Progress  Ongoing  -GR     LTG 3  Patient will score </=10/100 on the dizziness handicap inventory to indicate improved perceived positional tolerance.  -GR     LTG 3 Progress  Ongoing  -GR       User Key  (r) = Recorded By, (t) = Taken By, (c) = Cosigned By    Initials Name Provider Type    Mauri Mckay, PT Physical Therapist          Therapy Education  Education Details: reviewed retraction and used \"#1 rule\" to find neutral (Hand on sternum)              Time Calculation:   Start Time: 1030  Stop Time: 1115  Time Calculation (min): 45 min  Total Timed Code Minutes- PT: 45 minute(s)   Therapy Suggested Charges     Code   Minutes Charges    19332 (CPT®) Hc Pt Neuromusc Re Education Ea 15 Min 24 2    30529 (CPT®) Hc Pt Ther Proc Ea 15 Min 21 1    77640 (CPT®) Hc Gait Training Ea 15 Min      99531 (CPT®) Hc Pt Therapeutic Act Ea 15 Min      91329 (CPT®) Hc Pt Manual Therapy Ea 15 Min      05773 (CPT®) Hc Pt Ther Massage- Per 15 Min      65819 (CPT®) Hc Pt Iontophoresis Ea 15 Min      14089 (CPT®) Hc Pt Elec Stim Ea-Per 15 Min      82293 (CPT®) Hc Pt Ultrasound Ea 15 Min      83225 (CPT®) Hc Pt Self Care/Mgmt/Train Ea 15 Min      64137 (CPT®) Hc Pt Prosthetic (S) Train Initial Encounter, Each 15 Min      50981 (CPT®) Hc Orthotic(S) Mgmt/Train Initial Encounter, Each 15min      07022 (CPT®) Hc Pt Aquatic Therapy Ea 15 Min      19522 (CPT®) Hc Pt Orthotic(S)/Prosthetic(S) Encounter, " Each 15 Min       (CPT®) Hc Pt Electrical Stim Unattended      Total  45 3        Therapy Charges for Today     Code Description Service Date Service Provider Modifiers Qty    48291084324 HC PT NEUROMUSC RE EDUCATION EA 15 MIN 1/9/2019 Mauri Duran, PT GP 2    31318478837 HC PT THER PROC EA 15 MIN 1/9/2019 Mauri Duran, PT GP 1                   Mauri Duran, PT  1/9/2019

## 2019-01-11 ENCOUNTER — HOSPITAL ENCOUNTER (OUTPATIENT)
Dept: PHYSICAL THERAPY | Facility: HOSPITAL | Age: 74
Setting detail: THERAPIES SERIES
Discharge: HOME | End: 2019-01-11

## 2019-01-11 DIAGNOSIS — G44.86 CERVICOGENIC HEADACHE: ICD-10-CM

## 2019-01-11 DIAGNOSIS — R29.6 RECURRENT FALLS: Primary | ICD-10-CM

## 2019-01-11 DIAGNOSIS — R42 DIZZINESS: ICD-10-CM

## 2019-01-11 PROCEDURE — 97112 NEUROMUSCULAR REEDUCATION: CPT | Performed by: PHYSICAL THERAPIST

## 2019-01-11 PROCEDURE — 97110 THERAPEUTIC EXERCISES: CPT | Performed by: PHYSICAL THERAPIST

## 2019-01-11 NOTE — THERAPY TREATMENT NOTE
Outpatient Physical Therapy Vestibular Treatment Note  [facility]     Patient Name: Leroy Richardson  : 1945  MRN: [id number]  Today's Date: 2019      Visit Date: 2019    Visit Dx:     ICD-10-CM ICD-9-CM   1. Recurrent falls R29.6 V15.88   2. Dizziness R42 780.4   3. 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 (CMS/HCC)                       PT Assessment/Plan     Row Name 19 1137          PT Assessment    Assessment Comments  Advanced with GTB for posterior chain strengthening met with appropriate fatigue. Less sway outside RENO with SL taps.  -GR        PT Plan    PT Plan Comments  Continue functional balance and strengthening.  -GR       User Key  (r) = Recorded By, (t) = Taken By, (c) = Cosigned By    Initials Name Provider Type    GR Mauri Duran, PT Physical Therapist               Exercises     Row Name 19 1100             Subjective Comments    Subjective Comments  Some days balance is better and then other times not, ie challenged up steps at [place].  -GR         Subjective Pain    Able to rate subjective pain?  yes  -GR      Pre-Treatment Pain Level  0  -GR         Total Minutes    48660 - PT Therapeutic Exercise Minutes  20  -GR      37267 -  PT Neuromuscular Reeducation Minutes  25  -GR         Exercise 1    Exercise Name 1  Nustep  -GR      Time 1  5 minutes  -GR      Additional Comments  UE/LE L5  -GR         Exercise 2    Exercise Name 2  Rhomberg  -GR      Cueing 2  Demo  -GR      Sets 2  1  -GR      Reps 2  3  -GR      Time 2  30 seconds  -GR      Additional Comments  airex- eyes closed, perturbations, head turns yaw   -GR         Exercise 3    Exercise Name 3  Semi tandem  -GR      Cueing 3  Demo  -GR      Sets 3  1  -GR      Reps 3  6  -GR      Time 3  30 seconds  -GR      Additional Comments  airex- eyes closed, perturbations, head turns yaw  -GR         Exercise 7    Exercise Name 7  sidestepping with tband around knees  -GR      Cueing 7  Demo  -GR      Sets 7  6  -GR      Reps 7  10'  -GR      Additional Comments  GTB at knees  -GR         Exercise 8    Exercise Name 8  Rows and extension standing  -GR      Cueing 8  Demo  -GR      Sets 8  2  -GR      Reps 8  10  -GR      Additional Comments  GTB; each  -GR         Exercise 9    Exercise Name 9  SLS BOSU taps  -GR      Cueing 9  Demo  -GR      Sets 9  1  -GR      Reps 9  4  -GR      Time 9  30 sec taps  -GR      Additional Comments  2 each LE  -GR         Exercise 10    Exercise Name 10  LAQ  -GR      Cueing 10  Demo  -GR      Sets 10  2  -GR      Reps 10  10  -GR      Time 10  5 seconds  -GR      Additional Comments  3#  -GR         Exercise 12    Exercise Name 12  h/l clam  -GR      Cueing 12  Demo  -GR      Reps 12  20  -GR      Additional Comments  GTB  -GR         Exercise 13    Exercise Name 13  h/l add squeeze ball  -GR      Cueing 13  Demo  -GR      Sets 13  2  -GR      Reps 13  10  -GR         Exercise 14    Exercise Name 14  UT stretch standing holding barre for OP  -GR      Sets 14  1  -GR      Reps 14  3  -GR      Time 14  20 seconds  -GR         Exercise 15    Exercise Name 15  c/spine retraction   -GR      Cueing 15  Demo  -GR      Sets 15  1  -GR      Reps 15  10  -GR      Time 15  3 sec  -GR      Additional Comments  supine - towel roll  -GR         Exercise 16    Exercise Name 16  tandem beam walk  -GR      Reps 16  6 laps  -GR      Additional Comments  fingertips  -GR        User Key  (r) = Recorded By, (t) = Taken By, (c) = Cosigned By    Initials Name Provider Type    GR Mauri Duran, PT Physical Therapist                      PT OP Goals     Row  Name 01/11/19 1100          PT Short Term Goals    STG Date to Achieve  12/29/18  -GR     STG 1  Patient will be independent wtih initial HEP.  -GR     STG 1 Progress  Met  -GR     STG 2  Patient will deny falls.  -GR     STG 2 Progress  Met  -GR     STG 3  Patient will report use of stepping strategy for home safety.  -GR     STG 3 Progress  Ongoing  -GR        Long Term Goals    LTG Date to Achieve  01/28/19  -GR     LTG 1  Patient will score >/=90/120 on the modified CTSIB to indicate improved safety on varying surfaces.  -GR     LTG 1 Progress  Ongoing  -GR     LTG 2  Patient will be independent with adaptive techniques for community safety.  -GR     LTG 2 Progress  Ongoing  -GR     LTG 3  Patient will score </=10/100 on the dizziness handicap inventory to indicate improved perceived positional tolerance.  -GR     LTG 3 Progress  Ongoing  -GR       User Key  (r) = Recorded By, (t) = Taken By, (c) = Cosigned By    Initials Name Provider Type    Mauri Mckay, PT Physical Therapist          Therapy Education  Education Details: supine vs sitting retraction              Time Calculation:   Start Time: 1059  Stop Time: 1144  Time Calculation (min): 45 min  Total Timed Code Minutes- PT: 45 minute(s)   Therapy Suggested Charges     Code   Minutes Charges    52545 (CPT®) Hc Pt Neuromusc Re Education Ea 15 Min 25 2    46298 (CPT®) Hc Pt Ther Proc Ea 15 Min 20 1    91043 (CPT®) Hc Gait Training Ea 15 Min      65658 (CPT®) Hc Pt Therapeutic Act Ea 15 Min      07531 (CPT®) Hc Pt Manual Therapy Ea 15 Min      24703 (CPT®) Hc Pt Ther Massage- Per 15 Min      70059 (CPT®) Hc Pt Iontophoresis Ea 15 Min      94411 (CPT®) Hc Pt Elec Stim Ea-Per 15 Min      76401 (CPT®) Hc Pt Ultrasound Ea 15 Min      36335 (CPT®) Hc Pt Self Care/Mgmt/Train Ea 15 Min      85754 (CPT®) Hc Pt Prosthetic (S) Train Initial Encounter, Each 15 Min      60676 (CPT®) Hc Orthotic(S) Mgmt/Train Initial Encounter, Each 15min      32420 (CPT®) Hc Pt  Aquatic Therapy Ea 15 Min      37075 (CPT®) Hc Pt Orthotic(S)/Prosthetic(S) Encounter, Each 15 Min       (CPT®) Hc Pt Electrical Stim Unattended      Total  45 3        Therapy Charges for Today     Code Description Service Date Service Provider Modifiers Qty    89809041745 HC PT NEUROMUSC RE EDUCATION EA 15 MIN 1/11/2019 Mauri Duran, PT GP 2    31636030194 HC PT THER PROC EA 15 MIN 1/11/2019 Mauri Duran, PT GP 1                   Mauri Duran, PT  1/11/2019

## 2019-01-16 ENCOUNTER — HOSPITAL ENCOUNTER (OUTPATIENT)
Dept: PHYSICAL THERAPY | Facility: HOSPITAL | Age: 74
Setting detail: THERAPIES SERIES
Discharge: HOME | End: 2019-01-16

## 2019-01-16 DIAGNOSIS — R29.6 RECURRENT FALLS: Primary | ICD-10-CM

## 2019-01-16 DIAGNOSIS — R42 DIZZINESS: ICD-10-CM

## 2019-01-16 DIAGNOSIS — G44.86 CERVICOGENIC HEADACHE: ICD-10-CM

## 2019-01-16 PROCEDURE — 97112 NEUROMUSCULAR REEDUCATION: CPT | Performed by: PHYSICAL THERAPIST

## 2019-01-16 NOTE — THERAPY RE-EVALUATION
Outpatient Physical Therapy Vestibular Re-Evaluation  [facility]     Patient Name: Leroy Richardson  : 1945  MRN: [id number]  Today's Date: 2019      Visit Date: 2019    Patient Active Problem List   Diagnosis   • Disorder of aorta (CMS/HCC)   • Hypertension   • Sleep apnea   • Aortic valve insufficiency   • ST segment depression   • Nonrheumatic aortic valve stenosis   • Paroxysmal atrial fibrillation (CMS/HCC)   • Cervicogenic headache   • Grade 1 follicular lymphoma of lymph nodes of multiple regions (CMS/HCC)        Past Medical History:   Diagnosis Date   • Anxiety    • Aortic insufficiency     Mild   • Atrial fibrillation (CMS/HCC)    • Depression    • Heart murmur    • Hypertension    • Lymphoma, non-Hodgkin's (CMS/HCC)    • Nonrheumatic aortic (valve) insufficiency    • Nonrheumatic aortic (valve) stenosis    • PAF (paroxysmal atrial fibrillation) (CMS/HCC)    • Polyarthralgia 1997   • Sleep apnea    • Stroke (CMS/HCC)     eye        Past Surgical History:   Procedure Laterality Date   • AXILLARY LYMPH NODE BIOPSY/EXCISION Left 1996   • CATARACT EXTRACTION     • HEMORRHOIDECTOMY  ,    • PROSTATE BIOPSY  2007         Visit Dx:     ICD-10-CM ICD-9-CM   1. Recurrent falls R29.6 V15.88   2. Dizziness R42 780.4   3. Cervicogenic headache R51 784.0                           Therapy Education  Education Details: option to extend PT at 1-2x/week x 4 weeks  Given: Symptoms/condition management  Program: Reinforced  How Provided: Verbal  Provided to: Patient  Level of Understanding: Teach back education performed      Exercises     Row Name 19 1100             Subjective Comments    Subjective Comments  Patient arrived at 1055 for 1030 appt; able to accomodate.  He reports his balance is improving and denies falls.  He is considering extending PT.  -GR         Subjective Pain    Able to rate subjective pain?  yes  -GR      Pre-Treatment Pain Level  0  -GR          Total Minutes    19288 -  PT Neuromuscular Reeducation Minutes  30  -GR         Exercise 1    Exercise Name 1  Nustep  -GR      Time 1  4 minutes  -GR      Additional Comments  UE/LE L5  -GR         Exercise 4    Exercise Name 4  Modified CTSIB  -GR      Additional Comments  115/120 blue airex utilized for part 3 and 4  -GR         Exercise 5    Exercise Name 5  Wide RENO gait: head turns yaw and pitch  -GR      Reps 5  25 ft x 6  -GR         Exercise 6    Exercise Name 6  Wide RENO gait: 4 lb med ball trunk rotation and up/down flexion  -GR      Reps 6  25 ft x 6  -GR         Exercise 7    Exercise Name 7  sidestepping with tband around knees completed with tech after session - not billed  -GR      Cueing 7  --  -GR      Sets 7  6  -GR      Reps 7  10'  -GR      Additional Comments  GTB at knees  -GR         Exercise 8    Exercise Name 8  Rows and extension standing completed with tech after session - not billed  -GR      Cueing 8  --  -GR      Sets 8  2  -GR      Reps 8  10  -GR      Additional Comments  GTB; each  -GR         Exercise 9    Exercise Name 9  Step and head turn  -GR      Sets 9  2  -GR      Reps 9  20  -GR      Additional Comments  alt LE  -GR         Exercise 10    Exercise Name 10  LAQ completed with tech after session - not billed  -GR      Cueing 10  --  -GR      Sets 10  2  -GR      Reps 10  10  -GR      Time 10  5 seconds  -GR      Additional Comments  3#  -GR         Exercise 12    Exercise Name 12  h/l clam completed with tech after session - not billed  -GR      Cueing 12  Demo  -GR      Reps 12  20  -GR      Additional Comments  GTB  -GR         Exercise 13    Exercise Name 13  h/l add squeeze ball completed with tech after session - not billed  -GR      Cueing 13  Demo  -GR      Sets 13  2  -GR      Reps 13  10  -GR        User Key  (r) = Recorded By, (t) = Taken By, (c) = Cosigned By    Initials Name Provider Type    GR Mauri Duran, PT Physical Therapist                      PT OP  Goals     Row Name 01/16/19 1100          PT Short Term Goals    STG Date to Achieve  12/29/18  -GR     STG 1  Patient will be independent wt initial HEP.  -GR     STG 1 Progress  Met  -GR     STG 2  Patient will deny falls.  -GR     STG 2 Progress  Met  -GR     STG 3  Patient will report use of stepping strategy for home safety.  -GR     STG 3 Progress  Ongoing  -GR        Long Term Goals    LTG Date to Achieve  01/28/19  -GR     LTG 1  Patient will score >/=90/120 on the modified CTSIB to indicate improved safety on varying surfaces.  -GR     LTG 1 Progress  Met  -GR     LTG 1 Progress Comments  115/120  -GR     LTG 2  Patient will be independent with adaptive techniques for community safety.  -GR     LTG 2 Progress  Ongoing  -GR     LTG 3  Patient will score </=10/100 on the dizziness handicap inventory to indicate improved perceived positional tolerance.  -GR     LTG 3 Progress  Ongoing  -GR     LTG 3 Progress Comments  12/100  -GR       User Key  (r) = Recorded By, (t) = Taken By, (c) = Cosigned By    Initials Name Provider Type    Mauri Mckay, PT Physical Therapist          PT Assessment/Plan     Row Name 01/16/19 1325          PT Assessment    Assessment Comments  Mr. Richardson has attended 6 sessions of skilled PT for strength and balance training. He demonstrates improvement on the modified CTSIB from 71/120 to 115/120, where 120/120 = no loss of balance. He is denying falls and reporting improved confidence. Per his progress and remaining room for improvement recommend continue PT at 1-2x/week for 3-4 weeks.  -GR        PT Plan    PT Plan Comments  Continue 1-2x/week for 3-4 weeks if patient is agreeable.  -       User Key  (r) = Recorded By, (t) = Taken By, (c) = Cosigned By    Initials Name Provider Type    Mauri Mckay, PT Physical Therapist           Outcome Measure Options: Other Outcome Measure, Dizziness Handicap Inventory  Other Outcome Measure Tool Used  Other Outcome Measure  Tool Comments: 115/120; 12/100      Time Calculation:   Start Time: 1055  Stop Time: 1125  Time Calculation (min): 30 min  Total Timed Code Minutes- PT: 30 minute(s)   Therapy Suggested Charges     Code   Minutes Charges    15659 (CPT®) Hc Pt Neuromusc Re Education Ea 15 Min 30 2    68566 (CPT®) Hc Pt Ther Proc Ea 15 Min      81567 (CPT®) Hc Gait Training Ea 15 Min      63292 (CPT®) Hc Pt Therapeutic Act Ea 15 Min      52709 (CPT®) Hc Pt Manual Therapy Ea 15 Min      36160 (CPT®) Hc Pt Ther Massage- Per 15 Min      68563 (CPT®) Hc Pt Iontophoresis Ea 15 Min      89072 (CPT®) Hc Pt Elec Stim Ea-Per 15 Min      10128 (CPT®) Hc Pt Ultrasound Ea 15 Min      22067 (CPT®) Hc Pt Self Care/Mgmt/Train Ea 15 Min      02748 (CPT®) Hc Pt Prosthetic (S) Train Initial Encounter, Each 15 Min      88151 (CPT®) Hc Orthotic(S) Mgmt/Train Initial Encounter, Each 15min      32786 (CPT®) Hc Pt Aquatic Therapy Ea 15 Min      11370 (CPT®) Hc Pt Orthotic(S)/Prosthetic(S) Encounter, Each 15 Min       (CPT®) Hc Pt Electrical Stim Unattended      Total  30 2        Therapy Charges for Today     Code Description Service Date Service Provider Modifiers Qty    53037758832 HC PT NEUROMUSC RE EDUCATION EA 15 MIN 1/16/2019 Mauri Duran, PT GP 2          PT G-Codes  Outcome Measure Options: Other Outcome Measure, Dizziness Handicap Inventory         Mauri Duran, PT  1/16/2019

## 2019-01-18 ENCOUNTER — HOSPITAL ENCOUNTER (OUTPATIENT)
Dept: PHYSICAL THERAPY | Facility: HOSPITAL | Age: 74
Setting detail: THERAPIES SERIES
Discharge: HOME | End: 2019-01-18

## 2019-01-18 DIAGNOSIS — R42 DIZZINESS: ICD-10-CM

## 2019-01-18 DIAGNOSIS — G44.86 CERVICOGENIC HEADACHE: ICD-10-CM

## 2019-01-18 DIAGNOSIS — R29.6 RECURRENT FALLS: Primary | ICD-10-CM

## 2019-01-18 PROCEDURE — 97110 THERAPEUTIC EXERCISES: CPT | Performed by: PHYSICAL THERAPIST

## 2019-01-18 PROCEDURE — 97112 NEUROMUSCULAR REEDUCATION: CPT | Performed by: PHYSICAL THERAPIST

## 2019-01-18 NOTE — THERAPY TREATMENT NOTE
Outpatient Physical Therapy Vestibular Treatment Note  [facility]     Patient Name: Leroy Richardson  : 1945  MRN: [id number]  Today's Date: 2019      Visit Date: 2019    Visit Dx:     ICD-10-CM ICD-9-CM   1. Recurrent falls R29.6 V15.88   2. Dizziness R42 780.4   3. 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 (CMS/HCC)                       PT Assessment/Plan     Row Name 19 1041          PT Assessment    Assessment Comments  Improved stability with trunk rotation challenges; requires extra time to accomplish extra coordination. Does report upper body fatigue with progression to tuff stuff resistance.  -GR        PT Plan    PT Plan Comments  Continue at 1x/week frequency thru February.  -GR       User Key  (r) = Recorded By, (t) = Taken By, (c) = Cosigned By    Initials Name Provider Type    Mauri Mckay, PT Physical Therapist               Exercises     Row Name 19 1000             Subjective Comments    Subjective Comments  Sore in his hips after last session. Would like to continue/extend at 1x/week frequency.  -GR         Subjective Pain    Able to rate subjective pain?  yes  -GR      Pre-Treatment Pain Level  0  -GR         Total Minutes    11708 - PT Therapeutic Exercise Minutes  21  -GR      89732 -  PT Neuromuscular Reeducation Minutes  25  -GR         Exercise 1    Exercise Name 1  Nustep  -GR      Time 1  5 minutes  -GR      Additional Comments  UE/LE 5  -GR         Exercise 5    Exercise Name 5  Wide RENO gait: head turns yaw and pitch  -GR      Reps 5  25 ft x 6  -GR         Exercise 6    Exercise Name 6  Wide RENO gait: 4 lb med  ball trunk rotation and up/down flexion  -GR      Reps 6  25 ft x 6  -GR         Exercise 8    Exercise Name 8  Rows and extension standing  -GR      Additional Comments  tuff stuff L2  -GR         Exercise 9    Exercise Name 9  Step and head turn  -GR      Sets 9  2  -GR      Reps 9  20  -GR      Additional Comments  2lb med ball  -GR         Exercise 11    Exercise Name 11  Trunk rotation - tuff stuff L2  -GR      Reps 11  10  -GR      Additional Comments  facing each direction  -GR         Exercise 12    Exercise Name 12  h/l clam  -GR      Cueing 12  Demo  -GR      Reps 12  20  -GR      Additional Comments  GTB  -GR         Exercise 13    Exercise Name 13  h/l add squeeze ball  -GR      Cueing 13  Demo  -GR      Sets 13  2  -GR      Reps 13  10  -GR         Exercise 14    Exercise Name 14  Tandem walk holding noodle  -GR      Reps 14  40 ft x 4  -GR         Exercise 15    Exercise Name 15  March in place holding tuff stuff L2 lateraly  -GR      Reps 15  20  -GR      Additional Comments  resistance each direction  -GR         Exercise 16    Exercise Name 16  tandem beam walk  -GR      Reps 16  6 laps  -GR      Additional Comments  fingertips  -GR         Exercise 17    Exercise Name 17  c/spine retraction  -GR      Cueing 17  Verbal  -GR      Sets 17  1  -GR      Reps 17  20  -GR      Time 17  5 sec  -GR         Exercise 18    Exercise Name 18  SL clam  -GR      Cueing 18  Demo  -GR      Sets 18  1  -GR      Reps 18  20  -GR      Additional Comments  RTB each  -GR        User Key  (r) = Recorded By, (t) = Taken By, (c) = Cosigned By    Initials Name Provider Type    GR Mauri Duran, PT Physical Therapist                      PT OP Goals     Row Name 01/18/19 1000          PT Short Term Goals    STG Date to Achieve  12/29/18  -GR     STG 1  Patient will be independent wtih initial HEP.  -GR     STG 1 Progress  Met  -GR     STG 2  Patient will deny falls.  -GR     STG 2 Progress  Met  -GR     STG 3  Patient  will report use of stepping strategy for home safety.  -GR     STG 3 Progress  Ongoing  -GR        Long Term Goals    LTG Date to Achieve  01/28/19  -GR     LTG 1  Patient will score >/=90/120 on the modified CTSIB to indicate improved safety on varying surfaces.  -GR     LTG 1 Progress  Met  -GR     LTG 2  Patient will be independent with adaptive techniques for community safety.  -GR     LTG 2 Progress  Ongoing  -GR     LTG 3  Patient will score </=10/100 on the dizziness handicap inventory to indicate improved perceived positional tolerance.  -GR     LTG 3 Progress  Ongoing  -GR       User Key  (r) = Recorded By, (t) = Taken By, (c) = Cosigned By    Initials Name Provider Type    Mauri Mckay, PT Physical Therapist          Therapy Education  Education Details: repetition with balance training              Time Calculation:   Start Time: 1000  Stop Time: 1046  Time Calculation (min): 46 min  Total Timed Code Minutes- PT: 46 minute(s)   Therapy Suggested Charges     Code   Minutes Charges    93832 (CPT®) Hc Pt Neuromusc Re Education Ea 15 Min 25 2    56730 (CPT®) Hc Pt Ther Proc Ea 15 Min 21 1    00179 (CPT®) Hc Gait Training Ea 15 Min      66371 (CPT®) Hc Pt Therapeutic Act Ea 15 Min      91358 (CPT®) Hc Pt Manual Therapy Ea 15 Min      34631 (CPT®) Hc Pt Ther Massage- Per 15 Min      66372 (CPT®) Hc Pt Iontophoresis Ea 15 Min      51020 (CPT®) Hc Pt Elec Stim Ea-Per 15 Min      06904 (CPT®) Hc Pt Ultrasound Ea 15 Min      60906 (CPT®) Hc Pt Self Care/Mgmt/Train Ea 15 Min      74278 (CPT®) Hc Pt Prosthetic (S) Train Initial Encounter, Each 15 Min      31349 (CPT®) Hc Orthotic(S) Mgmt/Train Initial Encounter, Each 15min      58559 (CPT®) Hc Pt Aquatic Therapy Ea 15 Min      69224 (CPT®) Hc Pt Orthotic(S)/Prosthetic(S) Encounter, Each 15 Min       (CPT®) Hc Pt Electrical Stim Unattended      Total  46 3        Therapy Charges for Today     Code Description Service Date Service Provider Modifiers Qty     [id number]  PT NEUROMUSC RE EDUCATION EA 15 MIN 1/18/2019 Mauri Duran, PT GP 2    [id number]  PT THER PROC EA 15 MIN 1/18/2019 Mauri Duran, PT GP 1                   Mauri LOPEZ. Renee, PT  1/18/2019

## 2019-01-23 ENCOUNTER — HOSPITAL ENCOUNTER (OUTPATIENT)
Dept: PHYSICAL THERAPY | Facility: HOSPITAL | Age: 74
Setting detail: THERAPIES SERIES
Discharge: HOME | End: 2019-01-23

## 2019-01-23 DIAGNOSIS — R42 DIZZINESS: ICD-10-CM

## 2019-01-23 DIAGNOSIS — R29.6 RECURRENT FALLS: Primary | ICD-10-CM

## 2019-01-23 DIAGNOSIS — G44.86 CERVICOGENIC HEADACHE: ICD-10-CM

## 2019-01-23 PROCEDURE — 97110 THERAPEUTIC EXERCISES: CPT | Performed by: PHYSICAL THERAPIST

## 2019-01-23 PROCEDURE — 97112 NEUROMUSCULAR REEDUCATION: CPT | Performed by: PHYSICAL THERAPIST

## 2019-01-23 NOTE — THERAPY TREATMENT NOTE
Outpatient Physical Therapy Vestibular Treatment Note  [facility]     Patient Name: Leroy Richardson  : 1945  MRN: [id number]  Today's Date: 2019      Visit Date: 2019    Visit Dx:     ICD-10-CM ICD-9-CM   1. Recurrent falls R29.6 V15.88   2. Dizziness R42 780.4   3. 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 (CMS/HCC)                       PT Assessment/Plan     Row Name 19 1300          PT Assessment    Assessment Comments  Patient is demonstrating stepping strategy independently during balance challenges and also verbalizes use of wide RENO gait with spouse when symptomatic. He is progressing from a strength and balance standpoint appropriately. Remains rigid in trunk rotation and arm flexion with gait.  -GR       User Key  (r) = Recorded By, (t) = Taken By, (c) = Cosigned By    Initials Name Provider Type    GR Mauri Duran, PT Physical Therapist               Exercises     Row Name 19 1000             Subjective Comments    Subjective Comments  Thinks both balance and neck pain continue to improve.  -GR         Subjective Pain    Able to rate subjective pain?  yes  -GR      Pre-Treatment Pain Level  0  -GR         Total Minutes    97018 - PT Therapeutic Exercise Minutes  15  -GR      60929 -  PT Neuromuscular Reeducation Minutes  30  -GR         Exercise 1    Exercise Name 1  Nustep  -GR      Time 1  5 minutes  -GR      Additional Comments  UE/LE 5  -GR         Exercise 3    Exercise Name 3  Semi tandem wide - 1 foot green foam, 1 foot blue foam  -GR      Cueing 3  Demo  -GR      Sets 3  1  -GR      Reps 3  4  -GR      Time 3  30 seconds   -GR      Additional Comments  eyes closed  -GR         Exercise 5    Exercise Name 5  Wide RENO gait: head turns yaw and pitch  -GR      Reps 5  25 ft x 6  -GR      Additional Comments  (yaw and pitch = head side to side then up and down)  -GR         Exercise 6    Exercise Name 6  Hurdles - fwd and side step over in // bars  -GR      Reps 6  6 laps each  -GR      Additional Comments  no hands  -GR         Exercise 7    Exercise Name 7  Fitter board - AP and ML static stance  -GR      Reps 7  3   -GR      Time 7  30 seconds  -GR      Additional Comments  intermittent hands to prevent falling  -GR         Exercise 8    Exercise Name 8  Rows and extension standing  -GR      Sets 8  2  -GR      Reps 8  10  -GR      Additional Comments  tuff stuff L2  -GR         Exercise 9    Exercise Name 9  Step and head turn  -GR      Sets 9  2  -GR      Reps 9  20  -GR      Additional Comments  2lb med ball  -GR         Exercise 10    Exercise Name 10  Step and trunk rotation ( head stable)  -GR      Sets 10  2  -GR      Reps 10  20  -GR      Additional Comments  2 lb med ball  -GR         Exercise 11    Exercise Name 11  Hip abduction standing  -GR      Cueing 11  Demo  -GR      Sets 11  2  -GR      Reps 11  10  -GR      Additional Comments  3# each  -GR        User Key  (r) = Recorded By, (t) = Taken By, (c) = Cosigned By    Initials Name Provider Type    GR Mauri Duran, PT Physical Therapist                      PT OP Goals     Row Name 01/23/19 1300          PT Short Term Goals    STG Date to Achieve  12/29/18  -GR     STG 1  Patient will be independent wtih initial HEP.  -GR     STG 1 Progress  Met  -GR     STG 2  Patient will deny falls.  -GR     STG 2 Progress  Met  -GR     STG 3  Patient will report use of stepping strategy for home safety.  -GR     STG 3 Progress  Met  -GR     STG 3 Progress Comments  demonstrates without cueing in clinic on 1/23  -GR        Long Term Goals    LTG Date to Achieve  01/28/19  -GR      LTG 1  Patient will score >/=90/120 on the modified CTSIB to indicate improved safety on varying surfaces.  -GR     LTG 1 Progress  Met  -GR     LTG 2  Patient will be independent with adaptive techniques for community safety.  -GR     LTG 2 Progress  Ongoing  -GR     LTG 3  Patient will score </=10/100 on the dizziness handicap inventory to indicate improved perceived positional tolerance.  -GR     LTG 3 Progress  Ongoing  -GR       User Key  (r) = Recorded By, (t) = Taken By, (c) = Cosigned By    Initials Name Provider Type    Mauri Mckay, PT Physical Therapist          Therapy Education  Education Details: plan to d/c to I HEP at end of scheduled visits mid February; ultimat goal 3-4 strength exercises and 3-4 balance exercises              Time Calculation:   Start Time: 1030  Stop Time: 1115  Time Calculation (min): 45 min  Total Timed Code Minutes- PT: 45 minute(s)   Therapy Suggested Charges     Code   Minutes Charges    46443 (CPT®) Hc Pt Neuromusc Re Education Ea 15 Min 30 2    07076 (CPT®) Hc Pt Ther Proc Ea 15 Min 15 1    80216 (CPT®) Hc Gait Training Ea 15 Min      61755 (CPT®) Hc Pt Therapeutic Act Ea 15 Min      07795 (CPT®) Hc Pt Manual Therapy Ea 15 Min      34423 (CPT®) Hc Pt Ther Massage- Per 15 Min      02695 (CPT®) Hc Pt Iontophoresis Ea 15 Min      55474 (CPT®) Hc Pt Elec Stim Ea-Per 15 Min      58730 (CPT®) Hc Pt Ultrasound Ea 15 Min      99325 (CPT®) Hc Pt Self Care/Mgmt/Train Ea 15 Min      59055 (CPT®) Hc Pt Prosthetic (S) Train Initial Encounter, Each 15 Min      78996 (CPT®) Hc Orthotic(S) Mgmt/Train Initial Encounter, Each 15min      79150 (CPT®) Hc Pt Aquatic Therapy Ea 15 Min      33498 (CPT®) Hc Pt Orthotic(S)/Prosthetic(S) Encounter, Each 15 Min       (CPT®) Hc Pt Electrical Stim Unattended      Total  45 3        Therapy Charges for Today     Code Description Service Date Service Provider Modifiers Qty    80921557321 HC PT NEUROMUSC RE EDUCATION EA 15 MIN 1/23/2019  Mauri Duran, PT GP 2    [id number]  PT THER PROC EA 15 MIN 1/23/2019 Mauri Duran, PT GP 1                   Mauri LOPEZ. Renee, PT  1/23/2019

## 2019-01-30 ENCOUNTER — HOSPITAL ENCOUNTER (OUTPATIENT)
Dept: PHYSICAL THERAPY | Facility: HOSPITAL | Age: 74
Setting detail: THERAPIES SERIES
Discharge: HOME | End: 2019-01-30

## 2019-01-30 DIAGNOSIS — R42 DIZZINESS: ICD-10-CM

## 2019-01-30 DIAGNOSIS — G44.86 CERVICOGENIC HEADACHE: ICD-10-CM

## 2019-01-30 DIAGNOSIS — R29.6 RECURRENT FALLS: Primary | ICD-10-CM

## 2019-01-30 PROCEDURE — 97110 THERAPEUTIC EXERCISES: CPT

## 2019-01-30 PROCEDURE — 97112 NEUROMUSCULAR REEDUCATION: CPT

## 2019-01-30 NOTE — THERAPY TREATMENT NOTE
Outpatient Physical Therapy Ortho Treatment Note  [facility]     Patient Name: Leroy Richardson  : 1945  MRN: [id number]  Today's Date: 2019      Visit Date: 2019    Visit Dx:    ICD-10-CM ICD-9-CM   1. Recurrent falls R29.6 V15.88   2. Dizziness R42 780.4   3. 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 (CMS/HCC)        Past Medical History:   Diagnosis Date   • Anxiety    • Aortic insufficiency     Mild   • Atrial fibrillation (CMS/HCC)    • Depression    • Heart murmur    • Hypertension    • Lymphoma, non-Hodgkin's (CMS/HCC)    • Nonrheumatic aortic (valve) insufficiency    • Nonrheumatic aortic (valve) stenosis    • PAF (paroxysmal atrial fibrillation) (CMS/HCC)    • Polyarthralgia 1997   • Sleep apnea    • Stroke (CMS/HCC)     eye        Past Surgical History:   Procedure Laterality Date   • AXILLARY LYMPH NODE BIOPSY/EXCISION Left 1996   • CATARACT EXTRACTION     • HEMORRHOIDECTOMY  ,    • PROSTATE BIOPSY  2007                       PT Assessment/Plan     Row Name 19 1300          PT Assessment    Assessment Comments  Pt demonstrates improving stability with some ex's however with more challenging ex's he did better on the subsequent tries but not initially.  He is most challenged by ambulation with head turns R and L and he is unable to go up or down steps without looking at feet.  -JA        PT Plan    PT Plan Comments  recommend stagger stance balance with head turns be on final HEP (standing with foot on ledge below sink for safety) cont to challenge pts stability with varied gait and balance ex's  -REBECCA       User Key  (r) =  Recorded By, (t) = Taken By, (c) = Cosigned By    Initials Name Provider Type    Karine Mckeon, PT Physical Therapist              Exercises     Row Name 01/30/19 1300             Subjective Comments    Subjective Comments  I think I've improved on the heel to toe (tandem) walking.  I still have trouble on the (Fitter rockerboard) board.  -JA         Subjective Pain    Able to rate subjective pain?  yes  -JA      Pre-Treatment Pain Level  0  -JA         Total Minutes    63350 - PT Therapeutic Exercise Minutes  15  -JA      87865 -  PT Neuromuscular Reeducation Minutes  30  -JA         Exercise 1    Exercise Name 1  Nustep  -JA      Time 1  5 minutes  -JA      Additional Comments  UE/LE 5 (intermittent use of UE)  -JA         Exercise 3    Exercise Name 3  Semi tandem wide - 1 foot green foam, 1 foot blue foam  -JA      Cueing 3  Demo  -JA      Sets 3  1  -JA      Reps 3  4  -JA      Time 3  30 seconds  -JA      Additional Comments  EC, SBA w/pt in //bars  -JA         Exercise 5    Exercise Name 5  Wide RENO gait: head turns yaw and pitch  -JA      Reps 5  25 ft x 6  -JA      Additional Comments  SBA close monitored (yaw and pitch = head side to side then up and down)  -JA         Exercise 6    Exercise Name 6  Hurdles - fwd and side step over in // bars  -JA      Reps 6  6 laps each  -JA      Additional Comments  no hands but occasionally touched bars  -JA         Exercise 7    Exercise Name 7  Fitter board - AP and ML static stance  -JA      Reps 7  3   -JA      Time 7  30 seconds  -JA      Additional Comments  very challenging; intermittent hands to prevent falling  -JA         Exercise 8    Exercise Name 8  Rows and extension standing  -JA      Sets 8  2  -JA      Reps 8  10  -JA      Additional Comments  tuff stuff L2  -JA         Exercise 9    Exercise Name 9  Step and head turn  -JA      Sets 9  2  -JA      Reps 9  20  -JA         Exercise 10    Exercise Name 10  Step and trunk rotation ( head stable)   -JA      Sets 10  2  -JA      Reps 10  20  -JA         Exercise 11    Exercise Name 11  Hip abduction standing  -REBECCA      Cueing 11  Demo  -JA      Sets 11  2  -JA      Reps 11  10  -JA      Additional Comments  3#  -JA         Exercise 12    Exercise Name 12  up/down 4 steps  -JA      Reps 12  4  -JA         Exercise 13    Exercise Name 13  stagger stance at steps  -REBECCA      Reps 13  4 (2 ea)  -JA      Time 13  30sec  -JA      Additional Comments  one foot on floor, one on first step  -REBECCA        User Key  (r) = Recorded By, (t) = Taken By, (c) = Cosigned By    Initials Name Provider Type    Karine Mckeon, PT Physical Therapist                             Therapy Education  Education Details: Discussed using ledge beneath sink to prop foot on to practice stagger stance at home.  Given: Fall prevention and home safety  Program: Progressed  How Provided: Verbal, Demonstration  Provided to: Patient  Level of Understanding: Teach back education performed              Time Calculation:   Start Time: 1315  Stop Time: 1400  Time Calculation (min): 45 min  Therapy Suggested Charges     Code   Minutes Charges    31556 (CPT®) Hc Pt Neuromusc Re Education Ea 15 Min 30 2    64514 (CPT®) Hc Pt Ther Proc Ea 15 Min 15 1    56000 (CPT®) Hc Gait Training Ea 15 Min      62584 (CPT®) Hc Pt Therapeutic Act Ea 15 Min      77395 (CPT®) Hc Pt Manual Therapy Ea 15 Min      50629 (CPT®) Hc Pt Ther Massage- Per 15 Min      79548 (CPT®) Hc Pt Iontophoresis Ea 15 Min      58588 (CPT®) Hc Pt Elec Stim Ea-Per 15 Min      50950 (CPT®) Hc Pt Ultrasound Ea 15 Min      70896 (CPT®) Hc Pt Self Care/Mgmt/Train Ea 15 Min      12755 (CPT®) Hc Pt Prosthetic (S) Train Initial Encounter, Each 15 Min      95613 (CPT®) Hc Orthotic(S) Mgmt/Train Initial Encounter, Each 15min      93491 (CPT®) Hc Pt Aquatic Therapy Ea 15 Min      21062 (CPT®) Hc Pt Orthotic(S)/Prosthetic(S) Encounter, Each 15 Min       (CPT®) Hc Pt Electrical Stim Unattended       Total  45 3        Therapy Charges for Today     Code Description Service Date Service Provider Modifiers Qty    [id number]  PT NEUROMUSC RE EDUCATION EA 15 MIN 1/30/2019 Karine Freitas, PT GP 2    58077244821 HC PT THER PROC EA 15 MIN 1/30/2019 Karine Freitas PT GP 1                    Karine Fretias, PT  1/30/2019

## 2019-02-05 ENCOUNTER — HOSPITAL ENCOUNTER (OUTPATIENT)
Dept: PHYSICAL THERAPY | Facility: HOSPITAL | Age: 74
Setting detail: THERAPIES SERIES
Discharge: HOME | End: 2019-02-05

## 2019-02-05 DIAGNOSIS — G44.86 CERVICOGENIC HEADACHE: ICD-10-CM

## 2019-02-05 DIAGNOSIS — R29.6 RECURRENT FALLS: Primary | ICD-10-CM

## 2019-02-05 DIAGNOSIS — R42 DIZZINESS: ICD-10-CM

## 2019-02-05 PROCEDURE — 97112 NEUROMUSCULAR REEDUCATION: CPT | Performed by: PHYSICAL THERAPIST

## 2019-02-05 NOTE — THERAPY TREATMENT NOTE
Outpatient Physical Therapy Vestibular Treatment Note  [facility]     Patient Name: Leroy Richardson  : 1945  MRN: [id number]  Today's Date: 2019      Visit Date: 2019    Visit Dx:     ICD-10-CM ICD-9-CM   1. Recurrent falls R29.6 V15.88   2. Dizziness R42 780.4   3. 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 (CMS/[facility])                       PT Assessment/Plan     Row Name 19 1231          PT Assessment    Assessment Comments  Patient with improved stability on compliant surfaces in stagger stance, remains challenged on rocker board. Able to complete obstacle step overs outside of // bars this visit.  -GR        PT Plan    PT Plan Comments  Continue x 2 visits.  -GR       User Key  (r) = Recorded By, (t) = Taken By, (c) = Cosigned By    Initials Name Provider Type    Mauri Mckay, PT Physical Therapist               Exercises     Row Name 19 1000             Subjective Comments    Subjective Comments  Getting a cold - seeing MD this afternoon. No new changes. Challenged with rocker board.  -GR         Subjective Pain    Able to rate subjective pain?  yes  -GR      Pre-Treatment Pain Level  0  -GR         Total Minutes    81260 -  PT Neuromuscular Reeducation Minutes  45  -GR         Exercise 1    Exercise Name 1  Nustep  -GR      Time 1  5 minutes  -GR         Exercise 3    Exercise Name 3  Semi tandem wide - 1 foot green foam, 1 foot blue foam  -GR      Cueing 3  Demo  -GR      Sets 3  1  -GR      Reps 3  4  -GR      Time 3  30 seconds  -GR      Additional Comments  eyes closed  -GR         Exercise 4    Exercise Name 4  Gait with trunk  rotation 2lb med ball  -GR      Reps 4  40 ft x 4 laps  -GR         Exercise 5    Exercise Name 5  Gait with head rotation (yaw)  -GR      Reps 5  40 ft x 4 laps  -GR         Exercise 6    Exercise Name 6  Hurdles - fwd and side step over in // bars  -GR      Reps 6  6 laps each  -GR      Additional Comments  wide spacing then narrow spacing - out of // bars no hands  -GR         Exercise 7    Exercise Name 7  Fitter board - AP and ML static stance  -GR      Reps 7  6  -GR      Time 7  30 seconds  -GR      Additional Comments  first 3 in neutral second set in stagger  -GR         Exercise 9    Exercise Name 9  Step and head turn  -GR      Sets 9  2  -GR      Reps 9  20  -GR      Additional Comments  2lb med ball  -GR         Exercise 10    Exercise Name 10  Step and trunk rotation ( head stable)  -GR      Sets 10  2  -GR      Reps 10  20  -GR      Additional Comments  2lb med ball  -GR         Exercise 11    Exercise Name 11  SLS green foam  -GR      Sets 11  1  -GR      Reps 11  10  -GR      Time 11  10 sec  -GR        User Key  (r) = Recorded By, (t) = Taken By, (c) = Cosigned By    Initials Name Provider Type    GR Mauri Duran, PT Physical Therapist                      PT OP Goals     Row Name 02/05/19 1200          PT Short Term Goals    STG Date to Achieve  12/29/18  -GR     STG 1  Patient will be independent wt initial HEP.  -GR     STG 1 Progress  Met  -GR     STG 2  Patient will deny falls.  -GR     STG 2 Progress  Met  -GR     STG 3  Patient will report use of stepping strategy for home safety.  -GR     STG 3 Progress  Met  -GR        Long Term Goals    LTG Date to Achieve  01/28/19  -GR     LTG 1  Patient will score >/=90/120 on the modified CTSIB to indicate improved safety on varying surfaces.  -GR     LTG 1 Progress  Met  -GR     LTG 2  Patient will be independent with adaptive techniques for community safety.  -GR     LTG 2 Progress  Ongoing  -GR     LTG 3  Patient will score </=10/100 on the  dizziness handicap inventory to indicate improved perceived positional tolerance.  -GR     LTG 3 Progress  Ongoing  -GR       User Key  (r) = Recorded By, (t) = Taken By, (c) = Cosigned By    Initials Name Provider Type    Mauri Mckay, PT Physical Therapist          Therapy Education  Education Details: trial SLS at home              Time Calculation:   Start Time: 1045  Stop Time: 1130  Time Calculation (min): 45 min  Total Timed Code Minutes- PT: 45 minute(s)   Therapy Suggested Charges     Code   Minutes Charges    43230 (CPT®) Hc Pt Neuromusc Re Education Ea 15 Min 45 3    32861 (CPT®) Hc Pt Ther Proc Ea 15 Min      07702 (CPT®) Hc Gait Training Ea 15 Min      82288 (CPT®) Hc Pt Therapeutic Act Ea 15 Min      24156 (CPT®) Hc Pt Manual Therapy Ea 15 Min      27477 (CPT®) Hc Pt Ther Massage- Per 15 Min      95123 (CPT®) Hc Pt Iontophoresis Ea 15 Min      86520 (CPT®) Hc Pt Elec Stim Ea-Per 15 Min      84315 (CPT®) Hc Pt Ultrasound Ea 15 Min      88902 (CPT®) Hc Pt Self Care/Mgmt/Train Ea 15 Min      65702 (CPT®) Hc Pt Prosthetic (S) Train Initial Encounter, Each 15 Min      25957 (CPT®) Hc Orthotic(S) Mgmt/Train Initial Encounter, Each 15min      98199 (CPT®) Hc Pt Aquatic Therapy Ea 15 Min      83351 (CPT®) Hc Pt Orthotic(S)/Prosthetic(S) Encounter, Each 15 Min       (CPT®) Hc Pt Electrical Stim Unattended      Total  45 3        Therapy Charges for Today     Code Description Service Date Service Provider Modifiers Qty    32013854728 HC PT NEUROMUSC RE EDUCATION EA 15 MIN 2/5/2019 Mauri Duran, PT GP 3                   Mauri Duran, PT  2/5/2019

## 2019-02-08 ENCOUNTER — APPOINTMENT (OUTPATIENT)
Dept: PHYSICAL THERAPY | Facility: HOSPITAL | Age: 74
End: 2019-02-08

## 2019-02-15 ENCOUNTER — HOSPITAL ENCOUNTER (OUTPATIENT)
Dept: PHYSICAL THERAPY | Facility: HOSPITAL | Age: 74
Setting detail: THERAPIES SERIES
Discharge: HOME | End: 2019-02-15

## 2019-02-15 DIAGNOSIS — R42 DIZZINESS: ICD-10-CM

## 2019-02-15 DIAGNOSIS — R29.6 RECURRENT FALLS: Primary | ICD-10-CM

## 2019-02-15 DIAGNOSIS — G44.86 CERVICOGENIC HEADACHE: ICD-10-CM

## 2019-02-15 PROCEDURE — 97112 NEUROMUSCULAR REEDUCATION: CPT | Performed by: PHYSICAL THERAPIST

## 2019-02-15 PROCEDURE — 97110 THERAPEUTIC EXERCISES: CPT | Performed by: PHYSICAL THERAPIST

## 2019-02-15 NOTE — THERAPY DISCHARGE NOTE
Outpatient Physical Therapy Vestibular Treatment Note/Discharge Summary  [facility]     Patient Name: Leroy Richardson  : 1945  MRN: [id number]  Today's Date: 2/15/2019      Visit Date: 02/15/2019    Visit Dx:     ICD-10-CM ICD-9-CM   1. Recurrent falls R29.6 V15.88   2. Dizziness R42 780.4   3. 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 (CMS/HCC)          PT Ortho     Row Name 02/15/19 1000       General ROM    GENERAL ROM COMMENTS  cervical rot L 64 R 70  -GR      User Key  (r) = Recorded By, (t) = Taken By, (c) = Cosigned By    Initials Name Provider Type    Mauri Mckay, PT Physical Therapist                    PT Assessment/Plan     Row Name 02/15/19 1227          PT Assessment    Assessment Comments  Mr Richardson attended 11 sessions of vestibular PT. He is now independent with static and dynamic adaptation techniques.  He score 120/120 on the modified CTSIB test and 10/100 on the dizziness handicap inventory (were 90 and 12 respectively). He is independent with progressive HEP and appropriate to d/c at this time. Thank you for this referral.  -GR        PT Plan    PT Plan Comments  d/c to I HEP.  -GR       User Key  (r) = Recorded By, (t) = Taken By, (c) = Cosigned By    Initials Name Provider Type    Mauri Mckay, PT Physical Therapist               Exercises     Row Name 02/15/19 1000             Subjective Comments    Subjective Comments  Cx last visit due to being sick. Feels that if its appropriate he would like to d/c to home exercises today.  -GR         Subjective Pain    Able to rate subjective pain?  yes  -GR      Pre-Treatment Pain  Level  0  -GR         Total Minutes    02144 - PT Therapeutic Exercise Minutes  15  -GR      35564 -  PT Neuromuscular Reeducation Minutes  35  -GR         Exercise 1    Exercise Name 1  RCB  -GR      Time 1  5 minutes  -GR         Exercise 2    Exercise Name 2  Seated and supine cervical retraction  -GR      Cueing 2  Demo  -GR      Sets 2  1  -GR      Reps 2  10  -GR      Time 2  5 seconds  -GR      Additional Comments  each position  -GR         Exercise 3    Exercise Name 3  Semi tandem wide - 1 foot green foam, 1 foot blue foam  -GR      Cueing 3  Demo  -GR      Sets 3  1  -GR      Reps 3  4  -GR      Time 3  30 seconds  -GR         Exercise 4    Exercise Name 4  Gait with trunk rotation 2lb med ball  -GR      Reps 4  40 ft x 4 laps  -GR         Exercise 5    Exercise Name 5  Gait with head rotation (yaw)  -GR      Reps 5  40 ft x 4 laps  -GR         Exercise 6    Exercise Name 6  Hurdles - fwd and side step over in // bars  -GR      Reps 6  6 laps each  -GR         Exercise 7    Exercise Name 7  Fitter board - AP and ML static stance  -GR      Reps 7  6  -GR      Time 7  30 seconds  -GR      Additional Comments  first 3 in neutral second set in stagger  -GR         Exercise 9    Exercise Name 9  Step and head turn  -GR      Sets 9  2  -GR      Reps 9  20  -GR      Additional Comments  2lb med ball  -GR         Exercise 10    Exercise Name 10  Step and trunk rotation ( head stable)  -GR      Sets 10  2  -GR      Reps 10  20  -GR      Additional Comments  2lb med ball  -GR         Exercise 11    Exercise Name 11  Hip abduction standing  -GR      Cueing 11  Demo  -GR      Sets 11  2  -GR      Reps 11  10  -GR      Time 11  --  -GR      Additional Comments  no weight  -GR        User Key  (r) = Recorded By, (t) = Taken By, (c) = Cosigned By    Initials Name Provider Type    GR Mauri Duran, PT Physical Therapist                        PT OP Goals     Row Name 02/15/19 1226 02/15/19 1200       PT Short Term  Goals    STG Date to Achieve  --  12/29/18  -GR    STG 1  --  Patient will be independent wtih initial HEP.  -GR    STG 1 Progress  --  Met  -GR    STG 2  --  Patient will deny falls.  -GR    STG 2 Progress  --  Met  -GR    STG 3  --  Patient will report use of stepping strategy for home safety.  -GR    STG 3 Progress  --  Met  -GR       Long Term Goals    LTG Date to Achieve  01/28/19  -GR  --    LTG 1  Patient will score >/=90/120 on the modified CTSIB to indicate improved safety on varying surfaces.  -GR  --    LTG 1 Progress  Met  -GR  --    LTG 2  Patient will be independent with adaptive techniques for community safety.  -GR  --    LTG 2 Progress  Met  -GR  --    LTG 3  Patient will score </=10/100 on the dizziness handicap inventory to indicate improved perceived positional tolerance.  -GR  --    LTG 3 Progress  Met  -GR  --    LTG 3 Progress Comments  10/100  -GR  --    Row Name 02/15/19 1000          PT Short Term Goals    STG Date to Achieve  12/29/18  -GR     STG 1  Patient will be independent wt initial HEP.  -GR     STG 1 Progress  Met  -GR     STG 2  Patient will deny falls.  -GR     STG 2 Progress  Met  -GR     STG 3  Patient will report use of stepping strategy for home safety.  -GR     STG 3 Progress  Met  -GR        Long Term Goals    LTG Date to Achieve  01/28/19  -GR     LTG 1  Patient will score >/=90/120 on the modified CTSIB to indicate improved safety on varying surfaces.  -GR     LTG 1 Progress  Met  -GR     LTG 2  Patient will be independent with adaptive techniques for community safety.  -GR     LTG 2 Progress  Ongoing  -GR     LTG 3  Patient will score </=10/100 on the dizziness handicap inventory to indicate improved perceived positional tolerance.  -GR     LTG 3 Progress  Ongoing  -GR       User Key  (r) = Recorded By, (t) = Taken By, (c) = Cosigned By    Initials Name Provider Type    GR Mauri Duran, PT Physical Therapist          Therapy Education  Education Details: finalized  HEP    Outcome Measure Options: Dizziness Handicap Inventory  Other Outcome Measure Tool Used  Other Outcome Measure Tool Comments: 10/100      Time Calculation:   Start Time: 1045  Stop Time: 1135  Time Calculation (min): 50 min   Therapy Suggested Charges     Code   Minutes Charges    27960 (CPT®) Hc Pt Neuromusc Re Education Ea 15 Min 35 2    26964 (CPT®) Hc Pt Ther Proc Ea 15 Min 15 1    46704 (CPT®) Hc Gait Training Ea 15 Min      18465 (CPT®) Hc Pt Therapeutic Act Ea 15 Min      20246 (CPT®) Hc Pt Manual Therapy Ea 15 Min      36466 (CPT®) Hc Pt Ther Massage- Per 15 Min      98827 (CPT®) Hc Pt Iontophoresis Ea 15 Min      26325 (CPT®) Hc Pt Elec Stim Ea-Per 15 Min      28593 (CPT®) Hc Pt Ultrasound Ea 15 Min      27253 (CPT®) Hc Pt Self Care/Mgmt/Train Ea 15 Min      59660 (CPT®) Hc Pt Prosthetic (S) Train Initial Encounter, Each 15 Min      37388 (CPT®) Hc Orthotic(S) Mgmt/Train Initial Encounter, Each 15min      72814 (CPT®) Hc Pt Aquatic Therapy Ea 15 Min      99893 (CPT®) Hc Pt Orthotic(S)/Prosthetic(S) Encounter, Each 15 Min       (CPT®) Hc Pt Electrical Stim Unattended      Total  50 3        Therapy Charges for Today     Code Description Service Date Service Provider Modifiers Qty    73114438007 HC PT NEUROMUSC RE EDUCATION EA 15 MIN 2/15/2019 Mauri Duran, PT GP 2    77998178161 HC PT THER PROC EA 15 MIN 2/15/2019 Mauri Duran, PT GP 1          PT G-Codes  Outcome Measure Options: Dizziness Handicap Inventory     OP PT Discharge Summary  Date of Discharge: 02/15/19  Reason for Discharge: All goals achieved  Outcomes Achieved: Able to achieve all goals within established timeline  Discharge Destination: Home with home program  Discharge Instructions/Additional Comments: d/c to I FAIZA Duran, PT  2/15/2019

## 2019-10-28 ENCOUNTER — OFFICE VISIT (OUTPATIENT)
Dept: ORTHOPEDIC SURGERY | Facility: CLINIC | Age: 74
End: 2019-10-28

## 2019-10-28 VITALS — WEIGHT: 165 LBS | HEIGHT: 71 IN | BODY MASS INDEX: 23.1 KG/M2

## 2019-10-28 DIAGNOSIS — IMO0002 BURSITIS/TENDONITIS, SHOULDER: ICD-10-CM

## 2019-10-28 DIAGNOSIS — R52 PAIN: Primary | ICD-10-CM

## 2019-10-28 DIAGNOSIS — S80.01XA CONTUSION OF RIGHT KNEE, INITIAL ENCOUNTER: ICD-10-CM

## 2019-10-28 PROCEDURE — 73562 X-RAY EXAM OF KNEE 3: CPT | Performed by: NURSE PRACTITIONER

## 2019-10-28 PROCEDURE — 99212 OFFICE O/P EST SF 10 MIN: CPT | Performed by: NURSE PRACTITIONER

## 2019-10-28 RX ORDER — FLUOXETINE 10 MG/1
10 CAPSULE ORAL DAILY
COMMUNITY

## 2019-10-28 RX ORDER — BENZONATATE 200 MG/1
CAPSULE ORAL
Refills: 0 | COMMUNITY
Start: 2019-09-23 | End: 2019-12-17

## 2019-10-28 RX ORDER — NEBIVOLOL 5 MG/1
TABLET ORAL
COMMUNITY
Start: 2017-08-08

## 2019-10-28 RX ORDER — AMOXICILLIN 500 MG/1
CAPSULE ORAL
Refills: 2 | COMMUNITY
Start: 2019-10-24 | End: 2019-12-17

## 2019-10-28 NOTE — PROGRESS NOTES
Patient Name: Leroy Richardson   YOB: 1945  Referring Primary Care Physician: Cristina Daniel MD  BMI: Body mass index is 23.01 kg/m².    Chief Complaint:    Chief Complaint   Patient presents with   • Right Knee - Follow-up, Pain        HPI: Pt twisted his knee Thursday kneeling to get a gum ball out of machine and fell and hurt right knee. No history of injury or trauma and he is able to ambulate and has no limitations.    Leroy Richardson is a 74 y.o. male who presents today for evaluation of   Chief Complaint   Patient presents with   • Right Knee - Follow-up, Pain       This problem is new to this examiner.     Subjective   Medications:   Home Medications:  Current Outpatient Medications on File Prior to Visit   Medication Sig   • acetaminophen-codeine (TYLENOL #3) 300-30 MG per tablet Take 1 tablet by mouth Every 6 (Six) Hours As Needed. for pain   • amLODIPine (NORVASC) 2.5 MG tablet Take 2.5 mg by mouth Daily.   • amoxicillin (AMOXIL) 500 MG capsule TAKE 2 CAPSULES BY MOUTH NOW, THEN 1 EVERY 8 HOURS UNTIL COMPLETE   • aspirin (ASPIRIN ADULT LOW STRENGTH) 81 MG EC tablet Take  by mouth daily.   • benzonatate (TESSALON) 200 MG capsule TAKE 1 CAPSULE BY MOUTH 3 (THREE) TIMES DAILY AS NEEDED FOR COUGH FOR UP TO 10 DAYS.   • clonazePAM (KlonoPIN) 0.5 MG tablet Take 0.5 mg by mouth. Once everyother day   • cyanocobalamin (VITAMIN B-12) 500 MCG tablet Take  by mouth.   • finasteride (PROSCAR) 5 MG tablet Take 5 mg by mouth daily.   • fluconazole (DIFLUCAN) 200 MG tablet TAKE ONE TABLET BY MOUTH ONCE PER WEEK UNTIL NAILS GROW OUT   • FLUoxetine (PROzac) 10 MG capsule Take 10 mg by mouth Daily.   • nebivolol (BYSTOLIC) 5 MG tablet Take 1 tablet by mouth Daily.   • nebivolol (BYSTOLIC) 5 MG tablet TAKE 1 TABLET BY MOUTH EVERY DAY   • Omega-3 Fatty Acids (FISH OIL) 1200 MG capsule capsule Take  by mouth.   • sertraline (ZOLOFT) 100 MG tablet Take 100 mg by mouth Daily.     No current  facility-administered medications on file prior to visit.      Current Medications:  Scheduled Meds:  Continuous Infusions:  No current facility-administered medications for this visit.   PRN Meds:.    I have reviewed the patient's medical history in detail and updated the computerized patient record.  Review and summarization of old records includes:    Past Medical History:   Diagnosis Date   • Anxiety    • Aortic insufficiency     Mild   • Atrial fibrillation (CMS/HCC)    • Depression    • Heart murmur    • Hypertension    • Lymphoma, non-Hodgkin's (CMS/HCC)    • Nonrheumatic aortic (valve) insufficiency    • Nonrheumatic aortic (valve) stenosis    • PAF (paroxysmal atrial fibrillation) (CMS/[facility])    • Polyarthralgia 06/1997   • Sleep apnea    • Stroke (CMS/[facility])     eye        Past Surgical History:   Procedure Laterality Date   • AXILLARY LYMPH NODE BIOPSY/EXCISION Left 12/1996   • CATARACT EXTRACTION     • HEMORRHOIDECTOMY  1976, 1977   • PROSTATE BIOPSY  03/2007        Social History     Occupational History   • Occupation:      Employer: RETIRED   Tobacco Use   • Smoking status: Never Smoker   • Smokeless tobacco: Never Used   Substance and Sexual Activity   • Alcohol use: Yes     Comment: Occasional beer   • Drug use: Not on file   • Sexual activity: Defer      Social History     Social History Narrative   • Not on file        Family History   Problem Relation Age of Onset   • Heart failure Mother    • Heart failure Father    • Lymphoma Maternal Aunt    • Heart disease Other    • Hypertension Other    • Cancer Other         non-Hodgkin   • Lymphoma Other         Non-Hodgin lymphoma       ROS: 14 point review of systems was performed and all other systems were reviewed and are negative except for documented findings in HPI and today's encounter.     Allergies: No Known Allergies  Constitutional:  Denies fever, shaking or chills   Eyes:  Denies change in visual acuity   HENT:  Denies nasal congestion  "or sore throat   Respiratory:  Denies cough or shortness of breath   Cardiovascular:  Denies chest pain or severe LE edema   GI:  Denies abdominal pain, nausea, vomiting, bloody stools or diarrhea   Musculoskeletal:  Numbness, tingling, pain, or loss of motor function only as noted above in history of present illness.  : Denies painful urination or hematuria  Integument:  Denies rash, lesion or ulceration   Neurologic:  Denies headache or focal weakness  Endocrine:  Denies lymphadenopathy  Psych:  Denies confusion or change in mental status   Hem:  Denies active bleeding    OBJECTIVE:  Physical Exam:   Ht 180.3 cm (71\")   Wt 74.8 kg (165 lb)   BMI 23.01 kg/m²     General Appearance:    Alert, cooperative, in no acute distress                  Eyes: conjunctiva clear  ENT: external ears and nose atraumatic  CV: no peripheral edema  Resp: normal respiratory effort  Skin: no rashes or wounds; normal turgor  Psych: mood and affect appropriate  Lymph: no nodes appreciated  Neuro: gross sensation intact  Vascular:  Palpable peripheral pulse in noted extremity  Musculoskeletal Extremities: right knee has no effusion or swelling, skin is warm, dry and intact has good PMS and great range of motion. Pt has slight pain to posterior aspect of right knee.     Radiology: 3 views right knee no acute fracture and xrays done for acute pain and no readily available views for comparison.    Assessment:     ICD-10-CM ICD-9-CM   1. Pain R52 780.96   2. Contusion of right knee, initial encounter S80.01XA 924.11   3. Bursitis/tendonitis, shoulder M71.9 726.10    M67.919         Procedures     If pt has locking or catching can get a MRI - discussed with pt  Plan: Biomechanics of pertinent body area discussed.  Risks, benefits, alternatives, comparisons, and complications of accepted medicines, injections, recommendations, surgical procedures, and therapies explained and education provided in laymen's terms. Natural history and expected " course of this patient's diagnosis discussed along with evaluation of therapies. Questions answered. When appropriate I also discussed proper use of cane, walker, trekking poles.   EXERCISES:  Advice on benefits of, and types of regular/moderate exercise including biomechanical forces involved as it pertains to this complaint.  RICE: Rest, ice, compression, and elevation therapy, Cryotherapy/brachy therapy, and or OTC linaments as indicated with instructions.       10/28/2019    Much of this encounter note is an electronic transcription/translation of spoken language to printed text. The electronic translation of spoken language may permit erroneous, or at times, nonsensical words or phrases to be inadvertently transcribed; Although I have reviewed the note for such errors, some may still exist

## 2019-10-28 NOTE — PATIENT INSTRUCTIONS
RICE for Routine Care of Injuries  Many injuries can be cared for using rest, ice, compression, and elevation (RICE therapy). Using RICE therapy can help to lessen pain and swelling. It can help your body to heal.  Rest  Reduce your normal activities and avoid using the injured part of your body. You can go back to your normal activities when you feel okay and your doctor says it is okay.  Ice  Do not put ice on your bare skin.  · Put ice in a plastic bag.  · Place a towel between your skin and the bag.  · Leave the ice on for 20 minutes, 2-3 times a day.  Do this for as long as told by your doctor.  Compression  Compression means putting pressure on the injured area. This can be done with an elastic bandage. If an elastic bandage has been applied:  · Remove and reapply the bandage every 3-4 hours or as told by your doctor.  · Make sure the bandage is not wrapped too tight. Wrap the bandage more loosely if part of your body beyond the bandage is blue, swollen, cold, painful, or loses feeling (numb).  · See your doctor if the bandage seems to make your problems worse.  Elevation  Elevation means keeping the injured area raised. Raise the injured area above your heart or the center of your chest if you can.  WHEN SHOULD I GET HELP?  You should get help if:  · You keep having pain and swelling.  · Your symptoms get worse.  WHEN SHOULD I GET HELP RIGHT AWAY?  You should get help right away if:  · You have sudden bad pain at or below the area of your injury.  · You have redness or more swelling around your injury.  · You have tingling or numbness at or below the injury that does not go away when you take off the bandage.     This information is not intended to replace advice given to you by your health care provider. Make sure you discuss any questions you have with your health care provider.     Document Released: 06/05/2009 Document Revised: 09/07/2016 Document Reviewed: 11/25/2015  [employer] Interactive Patient  Education ©2017 [employer]

## 2019-12-17 ENCOUNTER — LAB (OUTPATIENT)
Dept: LAB | Facility: HOSPITAL | Age: 74
End: 2019-12-17

## 2019-12-17 ENCOUNTER — OFFICE VISIT (OUTPATIENT)
Dept: ONCOLOGY | Facility: CLINIC | Age: 74
End: 2019-12-17

## 2019-12-17 VITALS
HEIGHT: 70 IN | TEMPERATURE: 97.4 F | DIASTOLIC BLOOD PRESSURE: 82 MMHG | RESPIRATION RATE: 14 BRPM | OXYGEN SATURATION: 97 % | HEART RATE: 66 BPM | BODY MASS INDEX: 25.01 KG/M2 | SYSTOLIC BLOOD PRESSURE: 134 MMHG | WEIGHT: 174.7 LBS

## 2019-12-17 DIAGNOSIS — I77.9 DISORDER OF AORTA (HCC): Primary | ICD-10-CM

## 2019-12-17 DIAGNOSIS — C82.08 GRADE 1 FOLLICULAR LYMPHOMA OF LYMPH NODES OF MULTIPLE REGIONS (HCC): Primary | ICD-10-CM

## 2019-12-17 LAB
ALBUMIN SERPL-MCNC: 4.4 G/DL (ref 3.5–5.2)
ALBUMIN/GLOB SERPL: 1.5 G/DL (ref 1.1–2.4)
ALP SERPL-CCNC: 66 U/L (ref 38–116)
ALT SERPL W P-5'-P-CCNC: 11 U/L (ref 0–41)
ANION GAP SERPL CALCULATED.3IONS-SCNC: 10.8 MMOL/L (ref 5–15)
AST SERPL-CCNC: 18 U/L (ref 0–40)
BASOPHILS # BLD AUTO: 0.05 10*3/MM3 (ref 0–0.2)
BASOPHILS NFR BLD AUTO: 0.7 % (ref 0–1.5)
BILIRUB SERPL-MCNC: 0.5 MG/DL (ref 0.2–1.2)
BUN BLD-MCNC: 19 MG/DL (ref 6–20)
BUN/CREAT SERPL: 19.8 (ref 7.3–30)
CALCIUM SPEC-SCNC: 9.7 MG/DL (ref 8.5–10.2)
CHLORIDE SERPL-SCNC: 103 MMOL/L (ref 98–107)
CO2 SERPL-SCNC: 27.2 MMOL/L (ref 22–29)
CREAT BLD-MCNC: 0.96 MG/DL (ref 0.7–1.3)
DEPRECATED RDW RBC AUTO: 44.5 FL (ref 37–54)
EOSINOPHIL # BLD AUTO: 0.38 10*3/MM3 (ref 0–0.4)
EOSINOPHIL NFR BLD AUTO: 5.6 % (ref 0.3–6.2)
ERYTHROCYTE [DISTWIDTH] IN BLOOD BY AUTOMATED COUNT: 13.9 % (ref 12.3–15.4)
GFR SERPL CREATININE-BSD FRML MDRD: 77 ML/MIN/1.73
GLOBULIN UR ELPH-MCNC: 2.9 GM/DL (ref 1.8–3.5)
GLUCOSE BLD-MCNC: 87 MG/DL (ref 74–124)
HCT VFR BLD AUTO: 43 % (ref 37.5–51)
HGB BLD-MCNC: 14.6 G/DL (ref 13–17.7)
IMM GRANULOCYTES # BLD AUTO: 0.03 10*3/MM3 (ref 0–0.05)
IMM GRANULOCYTES NFR BLD AUTO: 0.4 % (ref 0–0.5)
LYMPHOCYTES # BLD AUTO: 1.53 10*3/MM3 (ref 0.7–3.1)
LYMPHOCYTES NFR BLD AUTO: 22.4 % (ref 19.6–45.3)
MCH RBC QN AUTO: 30.1 PG (ref 26.6–33)
MCHC RBC AUTO-ENTMCNC: 34 G/DL (ref 31.5–35.7)
MCV RBC AUTO: 88.7 FL (ref 79–97)
MONOCYTES # BLD AUTO: 0.39 10*3/MM3 (ref 0.1–0.9)
MONOCYTES NFR BLD AUTO: 5.7 % (ref 5–12)
NEUTROPHILS # BLD AUTO: 4.46 10*3/MM3 (ref 1.7–7)
NEUTROPHILS NFR BLD AUTO: 65.2 % (ref 42.7–76)
NRBC BLD AUTO-RTO: 0 /100 WBC (ref 0–0.2)
PLATELET # BLD AUTO: 178 10*3/MM3 (ref 140–450)
PMV BLD AUTO: 8.6 FL (ref 6–12)
POTASSIUM BLD-SCNC: 5.2 MMOL/L (ref 3.5–4.7)
PROT SERPL-MCNC: 7.3 G/DL (ref 6.3–8)
RBC # BLD AUTO: 4.85 10*6/MM3 (ref 4.14–5.8)
SODIUM BLD-SCNC: 141 MMOL/L (ref 134–145)
WBC NRBC COR # BLD: 6.84 10*3/MM3 (ref 3.4–10.8)

## 2019-12-17 PROCEDURE — 85025 COMPLETE CBC W/AUTO DIFF WBC: CPT

## 2019-12-17 PROCEDURE — 80053 COMPREHEN METABOLIC PANEL: CPT

## 2019-12-17 PROCEDURE — 99214 OFFICE O/P EST MOD 30 MIN: CPT | Performed by: INTERNAL MEDICINE

## 2019-12-17 PROCEDURE — 36415 COLL VENOUS BLD VENIPUNCTURE: CPT

## 2019-12-17 RX ORDER — MIRTAZAPINE 15 MG/1
15 TABLET, FILM COATED ORAL DAILY
COMMUNITY

## 2019-12-17 NOTE — PROGRESS NOTES
REASONS FOR FOLLOWUP:     1.;    Remote history of non-Hodgkin lymphoma, in remission for more than 18 years. He received Rituxan at the time of his original diagnosis. No recurrence since then. As per 10/08/2015, the patient remains asymptomatic in regard to lymphoma with no fevers, chi  lls, night sweats, pruritus, changes in weight, performance status, peripheral adenopathy or rashes in the skin. Normal clinical examination. The patient will remain in observation.       HISTORY OF PRESENT ILLNESSThis patient returns today to the office for followup in company of his wife stating that his depression is dramatically better. He is getting out of the house and he is enjoying the company of his granddaughter who is now 2 years old, extremely sharp, cute little girl that is making his life dramatically better. He has had a good appetite, stable weight, normal bowel function. No symptoms that would indicate any recurrence of lymphoma, specifically no fevers, chills, night sweats, pruritus, peripheral adenopathy or pain. He is going to have a CT scan of the chest tomorrow in order to assess his thoracic aortic aneurysm and then he will see his cardiac thoracic surgeon. He has no symptoms pertinent to this.                    PAST MEDICAL HISTORY:  The patient has had the common childhood illnesses and hemorrhoidectomy in 1976 and 1977.  In December of 1996 he had left axillary lymphadenopathy completely excise (4 cm).   Pathologically this was nodular, well differentiated, lymphocytic lymphoma ([id number]) [facility], flow cytometry showed a monoclonal population, CD20 and CD 10 positive, CD 5 negative, consistent with follicular center cell lymphoma.  No therapy was re  commended as she was LESLIE on careful staging workup. This included a negative bone marrow. Chest x-ray in 1998 showed a granuloma in the left upper lobe with calcified nodes in the left hilum.  He is PPD negative by history.          In  June of 1997, he was admitted to [facility] by Dr. Thomason and seen in consultation by Dr. Tiwari with acute polyarthralgia and he was briefly treated with steroids.          In August of 1999, he had evidence of recurrent disease with mediastinal, axillary and mesenteric lymphadenopath  y.  Right axillary lymph node biopsy by Dr. Fernandez ([facility] #[id number]) demonstrated PDL nodular lymphoma. The flow cytometry confirmed the fact that he was CD20 positive.          A trial of Leukeran was initiated in August of 1999 without response documented in October.  Therapy with Rituxan initiated on 10/14/99 (LYM-5).  Subsequent scans have shown slow, steady response to Rituxan.     Most recent scans were in January 2001 showing further slight interval decrease in size of the lymph nodes.         Scans in July of 2002 showed no evidence of lymphadenopathy.  Repeat scans in July of 2003 are LESLIE.        Surveillance scans in December 2004 and again in July 2005 showed no evidence of disease.        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  PSA elevation 12/06 requiring probably reevaluation in 2007 by Dr. Jefry Carballo.          Dr. Carballo performed prostate biopsy in March 2007 with no evidence o  f malignancy.  Also in 2007 Mr. Richardson underwent carotid Doppler scans showing no significant carotid stenosis and an echocardiogram showing some calcification of the aortic valve with mild aortic insufficiency.  He continues to followup with [facility].        MRI of brain in July 2009 failed to document any major abnormalities.          CT scans of chest, abdomen, and pelvis failed to document any progression of his lymphoma as per August 2009.           On 08/29/13 the patient states that he has been seen by Dr. Cox, his cardiologist, in regard his heart murmur and this has not changed overall and no changes to medications was advised.           He has been seen by his urologist and because he has a minor rise in his PSA to 5.1 the patient was advised to hav  e a new prostate biopsy and part of the material will be sent for new testing including DNA analysis that would include or exclude once and for all, being the third biopsy on his prostate, that he has or does not have prostate cancer.         HEMATOLOGIC/ONCOLOGIC HISTORY:  History from previous dates can be found in the separate document.         MEDICATIONS:  The current medication list was reviewed with the patient and updated in the EMR this date per the Medical Assistant.  Medication dosages and frequencies were confirmed to be accurate.        SOCIAL HISTORY: He is  and has a daughter. He is an .  He does not smoke or drink, an occasional beer.         FAMILY HISTORY: His father  at the age of 83, mother at the age of 72; both of heart failure.  He has two brothers and a sister. An aunt was treated for lymphoma.         REVIEW OF SYSTEMS:       General: no fever, no chills, no fatigue,no weight changes, no lack of appetite.  Eyes: no epiphora, xerophthalmia,conjunctivitis, pain, glaucoma, blurred vision, blindness, secretion, photophobia, proptosis, diplopia.  Ears: no otorrhea, tinnitus, otorrhagia, deafness, pain, vertigo.  Nose: no rhinorrhea, no epistaxis, no alteration in perception of odors, no sinuses pressure.  Mouth: no alteration in gums or teeth,  No ulcers, no difficulty with mastication or deglut ion, no odynophagia.  Neck: no masses or pain, no thyroid alterations, no pain in muscles or arteries, no carotid odynia, no crepitation.  Respiratory: no cough, no sputum production,no dyspnea,no trepopnea, no pleuritic pain,no hemoptysis.  Heart: no syncope, no irregularity, no palpitations, no angina,no orthopnea,no paroxysmal nocturnal dyspnea.  Vascular Venous: no tenderness,no edema,no palpable cords,no postphlebitic syndrome, no skin changes no  "ulcerations.  Vascular Arterial: no distal ischemia, noclaudication, no gangrene, no neuropathic ischemic pain, no skin ulcers, no paleness no cyanosis.  GI: no dysphagia, no odynophagia, no regurgitation, no heartburn,no indigestion,no nausea,no vomiting,no hematemesis ,no melena,no jaundice,no distention, no obstipation,no enterorrhagia,no proctalgia,no anal  lesions, no changes in bowel habits.  : no frequency, no hesitancy, no hematuria, no discharge,no  pain.  Musculoskeletal: no muscle or tendon pain or inflammation,no  joint pain, no edema, no functional limitation,no fasciculations, no mass.  Neurologic: no headache, no seizures, noalterations on Craneal nerves, no motor deficit, no sensory deficit, normal coordination, no alteration in memory,normal orientation, calculation,normal writting, verbal and written language.  Skin: no rashes,no pruritus no localized lesions.  Psychiatric: no anxiety, no depression,no agitation, no delusions, proper insight.          VITAL SIGNS:  Vitals:    19 1444   BP: 134/82   Pulse: 66   Resp: 14   Temp: 97.4 °F (36.3 °C)   TempSrc: Oral   SpO2: 97%   Weight: 79.2 kg (174 lb 11.2 oz)   Height: 179 cm (70.47\")          PAIN:  0 out of 10      ECO         PHYSICAL EXAMINATION:   GENERAL:  Well-developed, well-nourished  Patient  in no acute distress.   SKIN:  Warm, dry ,NO rashes,NO purpura ,NO petechiae.  HEENT:  Pupils were equal and reactive to light and accomodation, conjunctivas non injected, no pterigion, normal extraocular movements, normal visual acuity.   Mouth mucosa was moist, no exudates in oropharynx, normal gum line, normal roof of the mouth and pillars, normal papillations of the tongue.  NECK:  Supple with good range of motion; no thyromegaly or masses, no JVD or bruits, no cervical adenopathies.No carotid arteries pain, no carotid abnormal pulsation , NO arterial dance.  LYMPHATICS:  No cervical, NO supraclavicular, NO axillary,NO epitrochlear , NO " inguinal adenopathy.  CHEST:  Normal excursion of both holland thoraces, normal voice fremitus, no subcutaneous emphysema, normal axillas, no rashes or acanthosis nigricans. Lungs clear to percussion and auscultation, normal breath sounds bilaterally, no wheezing,NO crackles NO ronchi, NO stridor, NO rubs.  CARDIAC AND VASCULAR:  normal rate and regular rhythm, without murmurs,NO rubs NO S3 NO S4 right or left . Normal femoral, popliteal, pedis, brachial and carotid pulses.  ABDOMEN:  Soft, nontender with no organomegaly or masses, no ascites, no collateral circulation,no distention,no Joppa sign, no abdominal pain, no inguinal hernias,no umbilical hernia, no abdominal bruits. Normal bowel sounds.  GENITAL: Not  Performed.  EXTREMITIES  AND SPINE:  No clubbing, cyanosis or edema, no deformities or pain .No kyphosis, scoliosis, deformities or pain in spine, ribs or pelvic bone.  NEUROLOGICAL:  Patient was awake, alert, oriented to time, person and place.              LABORATORY DATA:  Visible to patient:  No (Not Released) Dx:  Disorder of aorta (CMS/HCC)   Component  Ref Range & Units 14:26   WBC  3.40 - 10.80 10*3/mm3 6.84    RBC  4.14 - 5.80 10*6/mm3 4.85    Hemoglobin  13.0 - 17.7 g/dL 14.6    Hematocrit  37.5 - 51.0 % 43.0    MCV  79.0 - 97.0 fL 88.7    MCH  26.6 - 33.0 pg 30.1    MCHC  31.5 - 35.7 g/dL 34.0    RDW  12.3 - 15.4 % 13.9    RDW-SD  37.0 - 54.0 fl 44.5    MPV  6.0 - 12.0 fL 8.6    Platelets  140 - 450 10*3/mm3 178    Neutrophil %  42.7 - 76.0 % 65.2    Lymphocyte %  19.6 - 45.3 % 22.4    Monocyte %  5.0 - 12.0 % 5.7    Eosinophil %  0.3 - 6.2 % 5.6    Basophil %  0.0 - 1.5 % 0.7    Immature Grans %  0.0 - 0.5 % 0.4    Neutrophils, Absolute  1.70 - 7.00 10*3/mm3 4.46                      ASSESSMENT/PLAN:  1. This patient has history of follicular lymphoma that was treated with Rituxan 20 years ago. Since he achieved remission, he has not required any other intervention and today he has no symptoms or  radiological findings or alterations in the laboratory parameters that would suggest any lymphoma recurrence. Again this is 20 years after the original diagnosis.   2. Psychiatric illness. The patient had major depression requiring aggressive psychiatric therapy at the [facility]. Since the previous visit, he has substantially improved. He is now more social and he is able to leave the house without the fears. He has no obsessive ideas anymore and he feels safer in different environments.   3. The patient has had frequent falls. He has fracture of the right clavicle and fracture of the left forearm after 2 of these events and he feels extremely unsafe walking around. His neurological examination is not very conclusive in regard to what the deficit is. I wonder if he has visual field perception abnormalities given the fact that he is only working with the right eye given the vascular event he had while taking Viagra, losing vision completely in his left eye and he never recovered. Therefore that raises the question if the patient needs to have some proper assessment by physical therapy for this and maybe some prevention of falls would be appropriate and also teaching him how to fall in the long run could have substantial benefit.     The patient was further reviewed on 12/17/2019. His psychiatric illness has had a dramatic bounce back to almost close to normality. He is still under treatment for this but he feels a different person. He has become more functional, more active. He is working back again, his wife around, and he is doing dramatically better physically and emotionally. He is going to visit his granddaughter in [place] frequently and he enjoys that very much. He is working as an usher at the baseball [employer] and he loves to do that now days. Therefore I think he has the control of his life back. He has nothing to document recurrence of lymphoma. White count, hemoglobin and platelets are  normal. He will proceed with his CT scan of the chest and discuss further with his cardiothoracic surgeon. From my point of view if he needs to have any work done, it is perfectly fine.     I will review him back in a year with a CBC and a CMP.

## 2020-02-03 ENCOUNTER — TELEPHONE (OUTPATIENT)
Dept: ONCOLOGY | Facility: CLINIC | Age: 75
End: 2020-02-03

## 2020-02-03 DIAGNOSIS — C82.08 GRADE 1 FOLLICULAR LYMPHOMA OF LYMPH NODES OF MULTIPLE REGIONS (HCC): Primary | ICD-10-CM

## 2020-02-03 NOTE — TELEPHONE ENCOUNTER
Patient had biopsy done by  and results were sent over. In the media tab of chart. Asking if  has reviewed it and what does he need to do from here. Msg sent to  for him to look over report if he hadnt already and let triage or pt know what he would like done going forward. Pt v/u that we were getting msg sent to MD.

## 2020-02-03 NOTE — TELEPHONE ENCOUNTER
Patient requesting a call back to explain what a Pet Scan is and the details of the procedure.    Patients Phone: [phone]

## 2020-02-07 ENCOUNTER — HOSPITAL ENCOUNTER (OUTPATIENT)
Dept: PET IMAGING | Facility: HOSPITAL | Age: 75
Discharge: HOME | End: 2020-02-07

## 2020-02-07 ENCOUNTER — HOSPITAL ENCOUNTER (OUTPATIENT)
Dept: PET IMAGING | Facility: HOSPITAL | Age: 75
Discharge: HOME | End: 2020-02-07
Admitting: INTERNAL MEDICINE

## 2020-02-07 DIAGNOSIS — C82.08 GRADE 1 FOLLICULAR LYMPHOMA OF LYMPH NODES OF MULTIPLE REGIONS (HCC): ICD-10-CM

## 2020-02-07 LAB — GLUCOSE BLDC GLUCOMTR-MCNC: 91 MG/DL (ref 70–130)

## 2020-02-07 PROCEDURE — A9552 F18 FDG: HCPCS | Performed by: INTERNAL MEDICINE

## 2020-02-07 PROCEDURE — 0 FLUDEOXYGLUCOSE F18 SOLUTION: Performed by: INTERNAL MEDICINE

## 2020-02-07 PROCEDURE — 78815 PET IMAGE W/CT SKULL-THIGH: CPT

## 2020-02-07 PROCEDURE — 82962 GLUCOSE BLOOD TEST: CPT

## 2020-02-07 RX ADMIN — FLUDEOXYGLUCOSE F18 1 DOSE: 300 INJECTION INTRAVENOUS at 08:59

## 2020-02-11 ENCOUNTER — LAB (OUTPATIENT)
Dept: LAB | Facility: HOSPITAL | Age: 75
End: 2020-02-11

## 2020-02-11 ENCOUNTER — OFFICE VISIT (OUTPATIENT)
Dept: ONCOLOGY | Facility: CLINIC | Age: 75
End: 2020-02-11

## 2020-02-11 VITALS
WEIGHT: 175.8 LBS | HEART RATE: 65 BPM | HEIGHT: 70 IN | RESPIRATION RATE: 12 BRPM | TEMPERATURE: 98.1 F | BODY MASS INDEX: 25.17 KG/M2 | DIASTOLIC BLOOD PRESSURE: 84 MMHG | OXYGEN SATURATION: 98 % | SYSTOLIC BLOOD PRESSURE: 145 MMHG

## 2020-02-11 DIAGNOSIS — C82.08 GRADE 1 FOLLICULAR LYMPHOMA OF LYMPH NODES OF MULTIPLE REGIONS (HCC): Primary | ICD-10-CM

## 2020-02-11 DIAGNOSIS — C82.08 GRADE 1 FOLLICULAR LYMPHOMA OF LYMPH NODES OF MULTIPLE REGIONS (HCC): ICD-10-CM

## 2020-02-11 LAB
ALBUMIN SERPL-MCNC: 4.7 G/DL (ref 3.5–5.2)
ALBUMIN/GLOB SERPL: 1.5 G/DL (ref 1.1–2.4)
ALP SERPL-CCNC: 74 U/L (ref 38–116)
ALT SERPL W P-5'-P-CCNC: 14 U/L (ref 0–41)
ANION GAP SERPL CALCULATED.3IONS-SCNC: 12.4 MMOL/L (ref 5–15)
AST SERPL-CCNC: 17 U/L (ref 0–40)
BASOPHILS # BLD AUTO: 0.03 10*3/MM3 (ref 0–0.2)
BASOPHILS NFR BLD AUTO: 0.4 % (ref 0–1.5)
BILIRUB SERPL-MCNC: 0.5 MG/DL (ref 0.2–1.2)
BUN BLD-MCNC: 21 MG/DL (ref 6–20)
BUN/CREAT SERPL: 23.9 (ref 7.3–30)
CALCIUM SPEC-SCNC: 9.8 MG/DL (ref 8.5–10.2)
CHLORIDE SERPL-SCNC: 102 MMOL/L (ref 98–107)
CO2 SERPL-SCNC: 26.6 MMOL/L (ref 22–29)
CREAT BLD-MCNC: 0.88 MG/DL (ref 0.7–1.3)
DEPRECATED RDW RBC AUTO: 42.8 FL (ref 37–54)
EOSINOPHIL # BLD AUTO: 0.21 10*3/MM3 (ref 0–0.4)
EOSINOPHIL NFR BLD AUTO: 2.7 % (ref 0.3–6.2)
ERYTHROCYTE [DISTWIDTH] IN BLOOD BY AUTOMATED COUNT: 13.2 % (ref 12.3–15.4)
GFR SERPL CREATININE-BSD FRML MDRD: 85 ML/MIN/1.73
GLOBULIN UR ELPH-MCNC: 3.1 GM/DL (ref 1.8–3.5)
GLUCOSE BLD-MCNC: 105 MG/DL (ref 74–124)
HCT VFR BLD AUTO: 45.6 % (ref 37.5–51)
HGB BLD-MCNC: 15.4 G/DL (ref 13–17.7)
IMM GRANULOCYTES # BLD AUTO: 0.05 10*3/MM3 (ref 0–0.05)
IMM GRANULOCYTES NFR BLD AUTO: 0.6 % (ref 0–0.5)
LDH SERPL-CCNC: 159 U/L (ref 99–259)
LYMPHOCYTES # BLD AUTO: 1.43 10*3/MM3 (ref 0.7–3.1)
LYMPHOCYTES NFR BLD AUTO: 18.5 % (ref 19.6–45.3)
MCH RBC QN AUTO: 30.6 PG (ref 26.6–33)
MCHC RBC AUTO-ENTMCNC: 33.8 G/DL (ref 31.5–35.7)
MCV RBC AUTO: 90.7 FL (ref 79–97)
MONOCYTES # BLD AUTO: 0.48 10*3/MM3 (ref 0.1–0.9)
MONOCYTES NFR BLD AUTO: 6.2 % (ref 5–12)
NEUTROPHILS # BLD AUTO: 5.53 10*3/MM3 (ref 1.7–7)
NEUTROPHILS NFR BLD AUTO: 71.6 % (ref 42.7–76)
NRBC BLD AUTO-RTO: 0 /100 WBC (ref 0–0.2)
PLATELET # BLD AUTO: 163 10*3/MM3 (ref 140–450)
PMV BLD AUTO: 8.3 FL (ref 6–12)
POTASSIUM BLD-SCNC: 4.8 MMOL/L (ref 3.5–4.7)
PROT SERPL-MCNC: 7.8 G/DL (ref 6.3–8)
RBC # BLD AUTO: 5.03 10*6/MM3 (ref 4.14–5.8)
SODIUM BLD-SCNC: 141 MMOL/L (ref 134–145)
WBC NRBC COR # BLD: 7.73 10*3/MM3 (ref 3.4–10.8)

## 2020-02-11 PROCEDURE — 99215 OFFICE O/P EST HI 40 MIN: CPT | Performed by: INTERNAL MEDICINE

## 2020-02-11 PROCEDURE — 36415 COLL VENOUS BLD VENIPUNCTURE: CPT

## 2020-02-11 PROCEDURE — 83615 LACTATE (LD) (LDH) ENZYME: CPT

## 2020-02-11 PROCEDURE — 85025 COMPLETE CBC W/AUTO DIFF WBC: CPT

## 2020-02-11 PROCEDURE — 80053 COMPREHEN METABOLIC PANEL: CPT

## 2020-02-11 RX ORDER — FAMOTIDINE 10 MG/ML
20 INJECTION, SOLUTION INTRAVENOUS AS NEEDED
Status: CANCELLED | OUTPATIENT
Start: 2020-03-12

## 2020-02-11 RX ORDER — SODIUM CHLORIDE 9 MG/ML
250 INJECTION, SOLUTION INTRAVENOUS ONCE
Status: CANCELLED | OUTPATIENT
Start: 2020-02-27

## 2020-02-11 RX ORDER — ACETAMINOPHEN 325 MG/1
650 TABLET ORAL ONCE
Status: CANCELLED | OUTPATIENT
Start: 2020-02-27

## 2020-02-11 RX ORDER — SODIUM CHLORIDE 9 MG/ML
250 INJECTION, SOLUTION INTRAVENOUS ONCE
Status: CANCELLED | OUTPATIENT
Start: 2020-03-05

## 2020-02-11 RX ORDER — DIPHENHYDRAMINE HYDROCHLORIDE 50 MG/ML
50 INJECTION INTRAMUSCULAR; INTRAVENOUS AS NEEDED
Status: CANCELLED | OUTPATIENT
Start: 2020-03-05

## 2020-02-11 RX ORDER — SODIUM CHLORIDE 9 MG/ML
250 INJECTION, SOLUTION INTRAVENOUS ONCE
Status: CANCELLED | OUTPATIENT
Start: 2020-03-12

## 2020-02-11 RX ORDER — SODIUM CHLORIDE 9 MG/ML
250 INJECTION, SOLUTION INTRAVENOUS ONCE
Status: CANCELLED | OUTPATIENT
Start: 2020-02-20

## 2020-02-11 RX ORDER — MEPERIDINE HYDROCHLORIDE 50 MG/ML
25 INJECTION INTRAMUSCULAR; INTRAVENOUS; SUBCUTANEOUS
Status: CANCELLED | OUTPATIENT
Start: 2020-02-20

## 2020-02-11 RX ORDER — ACETAMINOPHEN 325 MG/1
650 TABLET ORAL ONCE
Status: CANCELLED | OUTPATIENT
Start: 2020-03-12

## 2020-02-11 RX ORDER — MEPERIDINE HYDROCHLORIDE 50 MG/ML
25 INJECTION INTRAMUSCULAR; INTRAVENOUS; SUBCUTANEOUS
Status: CANCELLED | OUTPATIENT
Start: 2020-03-12

## 2020-02-11 RX ORDER — ACETAMINOPHEN 325 MG/1
650 TABLET ORAL ONCE
Status: CANCELLED | OUTPATIENT
Start: 2020-03-05

## 2020-02-11 RX ORDER — FAMOTIDINE 10 MG/ML
20 INJECTION, SOLUTION INTRAVENOUS AS NEEDED
Status: CANCELLED | OUTPATIENT
Start: 2020-02-27

## 2020-02-11 RX ORDER — DIPHENHYDRAMINE HYDROCHLORIDE 50 MG/ML
50 INJECTION INTRAMUSCULAR; INTRAVENOUS AS NEEDED
Status: CANCELLED | OUTPATIENT
Start: 2020-03-12

## 2020-02-11 RX ORDER — FAMOTIDINE 10 MG/ML
20 INJECTION, SOLUTION INTRAVENOUS AS NEEDED
Status: CANCELLED | OUTPATIENT
Start: 2020-03-05

## 2020-02-11 RX ORDER — ACETAMINOPHEN 325 MG/1
650 TABLET ORAL ONCE
Status: CANCELLED | OUTPATIENT
Start: 2020-02-20

## 2020-02-11 RX ORDER — MEPERIDINE HYDROCHLORIDE 50 MG/ML
25 INJECTION INTRAMUSCULAR; INTRAVENOUS; SUBCUTANEOUS
Status: CANCELLED | OUTPATIENT
Start: 2020-03-05

## 2020-02-11 RX ORDER — MEPERIDINE HYDROCHLORIDE 50 MG/ML
25 INJECTION INTRAMUSCULAR; INTRAVENOUS; SUBCUTANEOUS
Status: CANCELLED | OUTPATIENT
Start: 2020-02-27

## 2020-02-11 RX ORDER — DIPHENHYDRAMINE HYDROCHLORIDE 50 MG/ML
50 INJECTION INTRAMUSCULAR; INTRAVENOUS AS NEEDED
Status: CANCELLED | OUTPATIENT
Start: 2020-02-20

## 2020-02-11 RX ORDER — DIPHENHYDRAMINE HYDROCHLORIDE 50 MG/ML
50 INJECTION INTRAMUSCULAR; INTRAVENOUS AS NEEDED
Status: CANCELLED | OUTPATIENT
Start: 2020-02-27

## 2020-02-11 RX ORDER — FAMOTIDINE 10 MG/ML
20 INJECTION, SOLUTION INTRAVENOUS AS NEEDED
Status: CANCELLED | OUTPATIENT
Start: 2020-02-20

## 2020-02-11 NOTE — PROGRESS NOTES
REASONS FOR FOLLOWUP:        Remote history of non-Hodgkin lymphoma, in remission for more than 18 years. He received Rituxan at the time of his original diagnosis.     HISTORY OF PRESENT ILLNESSThis patient returns today to the office for followup. I have had a telephone conversation with Dr. Jansen a few days ago. Actually the patient was seen by [facility] to follow up on this thoracic aortic aneurysm. A CT scan of the chest was done that documented right axillary adenopathy. Dr. Jansen was consulted in regard to a biopsy. I decided after I talked with him to pursue a CT-guided biopsy and this was done at [facility]. The pathology has returned to be positive for a follicular lymphoma, grade 1. The patient in the interim had a PET scan requested by me and he is here to review the PET scan and make a decision how to proceed.     Physically the patient feels terrific. His appetite is good. His weight is stable. His bowel function is normal. Urination is normal. Some frequency to urinate given his enlarged prostate. He has not had any adenopathies at any level. No fever, no chills, no sweats, no pruritus. No alterations in weight or general sense of wellbeing. 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 His psychiatric illness is much better as well to the point that he is taking minimal amount of psychiatric medication for depression. The patient is very worried about his son-in-law who has not been able to find a job.                Past Medical History:   Diagnosis Date   • Anxiety    • Aortic insufficiency     Mild   • Atrial fibrillation (CMS/HCC)    • Depression    • Heart murmur    • Hypertension    • Lymphoma, non-Hodgkin's (CMS/HCC)    • Nonrheumatic aortic (valve) insufficiency    • Nonrheumatic aortic (valve) stenosis    • PAF  (paroxysmal atrial fibrillation) (CMS/HCC)    • Polyarthralgia 06/1997   • Sleep apnea    • Stroke (CMS/HCC)     eye         PAST ONCOLOGIC HISTORY:  The patient has had the common childhood illnesses and hemorrhoidectomy in 1976 and 1977.  In December of 1996 he had left axillary lymphadenopathy completely excise (4 cm).   Pathologically this was nodular, well differentiated, lymphocytic lymphoma ([id number]) [facility], flow cytometry showed a monoclonal population, CD20 and CD 10 positive, CD 5 negative, consistent with follicular center cell lymphoma.     This included a negative bone marrow. Chest x-ray in 1998 showed a granuloma in the left upper lobe with calcified nodes in the left hilum.  He is PPD negative by history.          In June of 1997, he was admitted to [facility] by Dr. Thomason and seen in consultation by Dr. Tiwari with acute polyarthralgia and he was briefly treated with steroids.          In August of 1999, he had evidence of recurrent disease with mediastinal, axillary and mesenteric lymphadenopath  y.  Right axillary lymph node biopsy by Dr. Fernandez ([facility] #[id number]) demonstrated PDL nodular lymphoma. The flow cytometry confirmed the fact that he was CD20 positive.          A trial of Leukeran was initiated in August of 1999 without response documented in October.  Therapy with Rituxan initiated on 10/14/99 (LYM-5).  Subsequent scans have shown slow, steady response to Rituxan.     Most recent scans were in January 2001 showing further slight interval decrease in size of the lymph nodes.         Scans in July of 2002 showed no evidence of lymphadenopathy.  Repeat scans in July of 2003 are LESLIE.        Surveillance scans in December 2004 and again in July 2005 showed no evidence of disease.        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  PSA elevation 12/06 requiring probably reevaluation in 2007 by Dr. Jefry Carballo.           Dr. Carballo performed prostate biopsy in March 2007 with no evidence o  f malignancy.  Also in 2007 Mr. Richardson underwent carotid Doppler scans showing no significant carotid stenosis and an echocardiogram showing some calcification of the aortic valve with mild aortic insufficiency.  He continues to followup with [facility].        MRI of brain in July 2009 failed to document any major abnormalities.          CT scans of chest, abdomen, and pelvis failed to document any progression of his lymphoma as per August 2009.           On 08/29/13 the patient states that he has been seen by Dr. Cox, his cardiologist, in regard his heart murmur and this has not changed overall and no changes to medications was advised.          He has been seen by his urologist and because he has a minor rise in his PSA to 5.1 the patient was advised to hav  e a new prostate biopsy and part of the material will be sent for new testing including DNA analysis that would include or exclude once and for all, being the third biopsy on his prostate, that he has or does not have prostate cancer.         Social History     Socioeconomic History   • Marital status:      Spouse name: Yue   • Number of children: 1   • Years of education: Not on file   • Highest education level: Not on file   Occupational History   • Occupation:      Employer: RETIRED   Tobacco Use   • Smoking status: Never Smoker   • Smokeless tobacco: Never Used   Substance and Sexual Activity   • Alcohol use: Yes     Comment: Occasional beer   • Sexual activity: Defer     Family History   Problem Relation Age of Onset   • Heart failure Mother    • Heart failure Father    • Lymphoma Maternal Aunt    • Heart disease Other    • Hypertension Other    • Cancer Other         non-Hodgkin   • Lymphoma Other         Non-Hodgin lymphoma       REVIEW OF SYSTEMS:           General: no fever, no chills, no fatigue,no weight changes, no lack of  appetite.  Eyes: no epiphora, xerophthalmia,conjunctivitis, pain, glaucoma, blurred vision, blindness, secretion, photophobia, proptosis, diplopia.  Ears: no otorrhea, tinnitus, otorrhagia, deafness, pain, vertigo.  Nose: no rhinorrhea, no epistaxis, no alteration in perception of odors, no sinuses pressure.  Mouth: no alteration in gums or teeth,  No ulcers, no difficulty with mastication or deglut ion, no odynophagia.  Neck: no masses or pain, no thyroid alterations, no pain in muscles or arteries, no carotid odynia, no crepitation.  Respiratory: no cough, no sputum production,no dyspnea,no trepopnea, no pleuritic pain,no hemoptysis.  Heart: no syncope, no irregularity, no palpitations, no angina,no orthopnea,no paroxysmal nocturnal dyspnea.  Vascular Venous: no tenderness,no edema,no palpable cords,no postphlebitic syndrome, no skin changes no ulcerations.  Vascular Arterial: no distal ischemia, noclaudication, no gangrene, no neuropathic ischemic pain, no skin ulcers, no paleness no cyanosis.  GI: no dysphagia, no odynophagia, no regurgitation, no heartburn,no indigestion,no nausea,no vomiting,no hematemesis ,no melena,no jaundice,no distention, no obstipation,no enterorrhagia,no proctalgia,no anal  lesions, no changes in bowel habits.  : no frequency, no hesitancy, no hematuria, no discharge,no  pain.  Musculoskeletal: no muscle or tendon pain or inflammation,no  joint pain, no edema, no functional limitation,no fasciculations, no mass.  Neurologic: no headache, no seizures, noalterations on Craneal nerves, no motor deficit, no sensory deficit, normal coordination, no alteration in memory,normal orientation, calculation,normal writting, verbal and written language.  Skin: no rashes,no pruritus no localized lesions.  Psychiatric: no anxiety, no depression,no agitation, no delusions, proper insight.          VITAL SIGNS:  Vitals:    02/11/20 1128   BP: 145/84   Pulse: 65   Resp: 12   Temp: 98.1 °F (36.7 °C)  "  TempSrc: Oral   SpO2: 98%   Weight: 79.7 kg (175 lb 12.8 oz)   Height: 179 cm (70.47\")          PAIN:  0 out of 10      ECO         PHYSICAL EXAMINATION:       GENERAL:  Well-developed, well-nourished  Patient  in no acute distress.   SKIN:  Warm, dry ,NO rashes,NO purpura ,NO petechiae.  HEENT:  Pupils were equal and reactive to light and accomodation, conjunctivas non injected, no pterigion, normal extraocular movements, normal visual acuity.   Mouth mucosa was moist, no exudates in oropharynx, normal gum line, normal roof of the mouth and pillars, normal papillations of the tongue.  NECK:  Supple with good range of motion; no thyromegaly or masses, no JVD or bruits, no cervical adenopathies.No carotid arteries pain, no carotid abnormal pulsation , NO arterial dance.  LYMPHATICS:  No cervical, NO supraclavicular, NO axillary,NO epitrochlear , NO inguinal adenopathy.  CHEST:  Normal excursion of both holland thoraces, normal voice fremitus, no subcutaneous emphysema, normal axillas, no rashes or acanthosis nigricans. Lungs clear to percussion and auscultation, normal breath sounds bilaterally, no wheezing,NO crackles NO ronchi, NO stridor, NO rubs.  CARDIAC AND VASCULAR:  normal rate and regular rhythm, without murmurs,NO rubs NO S3 NO S4 right or left . Normal femoral, popliteal, pedis, brachial and carotid pulses.  ABDOMEN:  Soft, nontender with no organomegaly or masses, no ascites, no collateral circulation,no distention,no Chay sign, no abdominal pain, no inguinal hernias,no umbilical hernia, no abdominal bruits. Normal bowel sounds.  GENITAL: Not  Performed.  EXTREMITIES  AND SPINE:  No clubbing, cyanosis or edema, no deformities or pain .No kyphosis, scoliosis, deformities or pain in spine, ribs or pelvic bone.  NEUROLOGICAL:  Patient was awake, alert, oriented to time, person and place.        LABORATORY DATA:  CBC Auto Differential   Order: [id number] - Part of Panel Order [id number]   Status:  Final " "result   Visible to patient:  No (Not Released)   Dx:  Grade 1 follicular lymphoma of lymph ...   Specimen Information: Blood        Component  Ref Range & Units 11:10   WBC  3.40 - 10.80 10*3/mm3 7.73    RBC  4.14 - 5.80 10*6/mm3 5.03    Hemoglobin  13.0 - 17.7 g/dL 15.4    Hematocrit  37.5 - 51.0 % 45.6    MCV  79.0 - 97.0 fL 90.7    MCH  26.6 - 33.0 pg 30.6    MCHC  31.5 - 35.7 g/dL 33.8    RDW  12.3 - 15.4 % 13.2    RDW-SD  37.0 - 54.0 fl 42.8    MPV  6.0 - 12.0 fL 8.3    Platelets  140 - 450 10*3/mm3 163    Neutrophil %  42.7 - 76.0 % 71.6    Lymphocyte %  19.6 - 45.3 % 18.5Low             FOX FABIANJesusita  YOB: 1945  MRN: [id number]  Accession#: [id number]  Collected: 1/30/2020  Received: 1/30/2020    [facility] [place] [id number]      DIAGNOSIS:    SPECIMEN DESIGNATED \"RIGHT AXILLARY LYMPH NODE\" WITH INVOLVEMENT BY FOLLICULAR       LYMPHOMA, GRADE 1/2.      PATHOLOGIST COMMENT:    This morphology and immunohistochemical staining are in agreement with the  submitted flow cytometric studies ([id number]).    FISH testing for BCL-2, BCL-6 and MYC that follows [id number].    Reviewed for QA: [facility]    **Electronically Signed Out on 1/31/2020 **        TAMICA SANCHEZ MD   aw/1/31/2020      Interpretation performed at:  [place] [address] [place] [address]    CLINICAL HISTORY:  CLINICAL HISTORY AND SPECIAL REQUESTS: RIGHT AXILLARY LYMPH NODE, REMOTE HX OF  LYMPHOMA OF RIGHT AXILLA, 20+ YRS    SPECIMEN SOURCE:  RIGHT AXILLA    GROSS DESCRIPTION:  Received in formalin and in RPMI labeled \"right axillary lymph node biopsy\". The  specimen in formalin consists of two pink-tan elongated soft tissue cores 0.2  and 1.1 cm in length averaging 0.1 cm in diameter, submitted in toto as A1. The  specimen in RPMI consists of three white-tan elongated " "soft tissue cores ranging  from 1.2 to 2.1 cm in length and averaging 0.1 cm in diameter. The specimen in  RPMI is given to the flow lab for additional studies.      cs/1/30/2020  FLORES Brar   MICROSCOPIC DESCRIPTION:  The microscopical examination of the specimen designated \"right axillary lymph  node\" core biopsy reveals one adequate core of apparent lymph node. The normal  architecture of this lymph node has been replaced by a malignant lymphoma, which  at low power has a distinctly follicular growth pattern. High power examination  of the neoplastic nodules reveal them to consist of a mixture of centrocytes and  centroblasts with >5 centroblasts in the high power field, but <15.    Using appropriate positive and negative controls, block A1 is stained for the  presence of immunoreactive CD3, PAX-5, BCL-1, BCL-2, BCL-6, CD10, CD21 and Ki-67  via an automated immunohistochemical technique utilizing appropriate epitope  enhancement. Examination of the immunohistochemical staining reveals a malignant  lymphoma to stain positive with PAX-5, BCL-2, BCL-6 and CD10 with up to 30% of  lymphoma cells staining positive for Ki-67, but is negative for CD3 and BCL-1.  No lymphoid cells stain positively for BCL-1. The lymphoma is negative for CD21;  however, there is an expanded and disruptive dendritic cell network as evidenced  by staining with CD21 confirming a follicular growth pattern.     All internal and external controls react appropriately.        Ryan Maynard M.D. - Medical Director      F-18 FDG PET FROM SKULL BASE TO MID THIGH WITH PET/CT FUSION     HISTORY: 74-year-old male with recurrent lymphoma. Restaging.     TECHNIQUE: Radiation dose reduction techniques were utilized, including  automated exposure control and exposure modulation based on body size.   Blood glucose level at time of injection was 91 mg/dL.  6.7 mCi of F-18  FDG were injected and PET was performed from skull base to mid thigh. " CT  was obtained for localization and attenuation correction. Time at  injection 8:49 AM. PET start time 10:17 AM. Compared with an outside  facility chest CT from 12/18/2019 as well as a chest CT from 02/13/2018.     FINDINGS: There is a hypermetabolic 2.0 x 1.4 cm right axillary node  with a maximal SUV of 6.2. There is a very small ill-defined  hypermetabolic node along the proximal aspect of the right lower lobe  bronchovascular bundle with a maximal SUV of 4.7. There is no  hypermetabolic lymphadenopathy within the supraclavicular regions or  neck. The spleen appears enlarged and has a similar intensity of  metabolic activity as the liver. There is no hypermetabolic  lymphadenopathy within the abdomen or pelvis.     IMPRESSION:  1. There is a hypermetabolic 2.0 x 1.4 cm right axillary node and a very  small hypermetabolic node along the right lower lobe bronchovascular  bundle.  2. Splenomegaly.  3. Incidentally noted, the prostate is significantly enlarged measuring  approximately 6.5 cm transversely. There is a 1.5 cm stone within the  urinary bladder.     This report was finalized on 2/10/2020 12:16 PM by Dr. Nicki Marquez M.D.               ASSESSMENT/PLAN:  1. This patient has history of follicular lymphoma that was treated with Rituxan 20 years ago. The patient has been watched close to 20 years after 4 infusions of Rituxan under Lymphoma 5 clinical trial protocol and recently he had a CT scan of his thoracic aortic aneurysm that documented a right axillary lymph node. This was followed up by a biopsy that documents a follicular lymphoma, grade 1 that was BCL-1 negative. This triggered a PET scan that I have reviewed with the patient and wife today. The lymph node visible in the PET scan is not palpable on clinical grounds. There is a minimal uptake in a lymph node in the right hilar area and there is minimal splenomegaly but the spleen has no different SUV activity than the liver has. Other than that,  there is no other activity at any level in his body.     His white count, hemoglobin and platelets are normal. His chemistry profile is normal. His white count differential is normal. He has no B symptoms.     In other words, I think this patient has a recurrent follicular lymphoma that was originally treated 20 years ago. On clinical grounds, I do not feel any palpable lymph nodes at any level and the PET scan talks on its own. I cannot tell if the spleen enlargement is suggestion of lymphoma. Very likely it is the case even though has no notable SUV activity.     RECOMMENDATIONS: I do believe that the best treatment in this patient will be to proceed with Rituxan infusion. This medicine kept him in remission for 20 years and maybe this can do the same thing for this circumstance. He will be scheduled to initiate treatment next week. He will be tested for hepatitis B in preparation for that and we will do today an LDH as well as serum protein immunoelectrophoresis.     The patient recalled himself his Rituxan infusion in regard to the need for a long infusion during the 1st day, subsequently faster infusions.     The patient will be seen by me when he goes through his 3rd infusion.     Two months after the completion of the Rituxan infusion, he will have a repeat PET scan. Again, his physical examination is not usable for staging or for assessment in regard to clinical status. The only way will be around PET scan.     I discussed all these facts with his wife and he was ready to proceed.     In regard to his psychiatric illness, depression, he is doing very well from this point of view even though he is very anxious about the lack of jobs for his son-in-law. Hopefully something good will happen in this arena very soon.

## 2020-02-12 LAB
ALBUMIN SERPL-MCNC: 4.2 G/DL (ref 2.9–4.4)
ALBUMIN/GLOB SERPL: 1.4 {RATIO} (ref 0.7–1.7)
ALPHA1 GLOB FLD ELPH-MCNC: 0.2 G/DL (ref 0–0.4)
ALPHA2 GLOB SERPL ELPH-MCNC: 0.7 G/DL (ref 0.4–1)
B-GLOBULIN SERPL ELPH-MCNC: 1.1 G/DL (ref 0.7–1.3)
GAMMA GLOB SERPL ELPH-MCNC: 1.3 G/DL (ref 0.4–1.8)
GLOBULIN SER CALC-MCNC: 3.2 G/DL (ref 2.2–3.9)
IGA SERPL-MCNC: 296 MG/DL (ref 61–437)
IGG SERPL-MCNC: 1312 MG/DL (ref 700–1600)
IGM SERPL-MCNC: 43 MG/DL (ref 15–143)
INTERPRETATION SERPL IEP-IMP: NORMAL
KAPPA LC SERPL-MCNC: 19.1 MG/L (ref 3.3–19.4)
KAPPA LC/LAMBDA SER: 1.34 {RATIO} (ref 0.26–1.65)
LAMBDA LC FREE SERPL-MCNC: 14.3 MG/L (ref 5.7–26.3)
Lab: NORMAL
M-SPIKE: NORMAL G/DL
PROT SERPL-MCNC: 7.4 G/DL (ref 6–8.5)

## 2020-02-20 ENCOUNTER — INFUSION (OUTPATIENT)
Dept: ONCOLOGY | Facility: HOSPITAL | Age: 75
End: 2020-02-20

## 2020-02-20 VITALS
HEART RATE: 73 BPM | WEIGHT: 178 LBS | OXYGEN SATURATION: 96 % | TEMPERATURE: 98.6 F | DIASTOLIC BLOOD PRESSURE: 70 MMHG | BODY MASS INDEX: 25.2 KG/M2 | SYSTOLIC BLOOD PRESSURE: 116 MMHG

## 2020-02-20 DIAGNOSIS — C82.08 GRADE 1 FOLLICULAR LYMPHOMA OF LYMPH NODES OF MULTIPLE REGIONS (HCC): Primary | ICD-10-CM

## 2020-02-20 LAB
ALBUMIN SERPL-MCNC: 4.5 G/DL (ref 3.5–5.2)
ALBUMIN/GLOB SERPL: 1.6 G/DL (ref 1.1–2.4)
ALP SERPL-CCNC: 71 U/L (ref 38–116)
ALT SERPL W P-5'-P-CCNC: 14 U/L (ref 0–41)
ANION GAP SERPL CALCULATED.3IONS-SCNC: 13.6 MMOL/L (ref 5–15)
AST SERPL-CCNC: 15 U/L (ref 0–40)
BASOPHILS # BLD AUTO: 0.03 10*3/MM3 (ref 0–0.2)
BASOPHILS NFR BLD AUTO: 0.5 % (ref 0–1.5)
BILIRUB SERPL-MCNC: 0.5 MG/DL (ref 0.2–1.2)
BUN BLD-MCNC: 18 MG/DL (ref 6–20)
BUN/CREAT SERPL: 20.5 (ref 7.3–30)
CALCIUM SPEC-SCNC: 9.6 MG/DL (ref 8.5–10.2)
CHLORIDE SERPL-SCNC: 101 MMOL/L (ref 98–107)
CO2 SERPL-SCNC: 24.4 MMOL/L (ref 22–29)
CREAT BLD-MCNC: 0.88 MG/DL (ref 0.7–1.3)
DEPRECATED RDW RBC AUTO: 42.7 FL (ref 37–54)
EOSINOPHIL # BLD AUTO: 0.18 10*3/MM3 (ref 0–0.4)
EOSINOPHIL NFR BLD AUTO: 2.9 % (ref 0.3–6.2)
ERYTHROCYTE [DISTWIDTH] IN BLOOD BY AUTOMATED COUNT: 13.1 % (ref 12.3–15.4)
GFR SERPL CREATININE-BSD FRML MDRD: 85 ML/MIN/1.73
GLOBULIN UR ELPH-MCNC: 2.9 GM/DL (ref 1.8–3.5)
GLUCOSE BLD-MCNC: 97 MG/DL (ref 74–124)
HBV SURFACE AB SER RIA-ACNC: NORMAL
HBV SURFACE AG SERPL QL IA: NORMAL
HCT VFR BLD AUTO: 43.4 % (ref 37.5–51)
HGB BLD-MCNC: 14.7 G/DL (ref 13–17.7)
HOLD SPECIMEN: NORMAL
IMM GRANULOCYTES # BLD AUTO: 0.05 10*3/MM3 (ref 0–0.05)
IMM GRANULOCYTES NFR BLD AUTO: 0.8 % (ref 0–0.5)
LYMPHOCYTES # BLD AUTO: 1.2 10*3/MM3 (ref 0.7–3.1)
LYMPHOCYTES NFR BLD AUTO: 19.3 % (ref 19.6–45.3)
MCH RBC QN AUTO: 30.2 PG (ref 26.6–33)
MCHC RBC AUTO-ENTMCNC: 33.9 G/DL (ref 31.5–35.7)
MCV RBC AUTO: 89.3 FL (ref 79–97)
MONOCYTES # BLD AUTO: 0.39 10*3/MM3 (ref 0.1–0.9)
MONOCYTES NFR BLD AUTO: 6.3 % (ref 5–12)
NEUTROPHILS # BLD AUTO: 4.38 10*3/MM3 (ref 1.7–7)
NEUTROPHILS NFR BLD AUTO: 70.2 % (ref 42.7–76)
NRBC BLD AUTO-RTO: 0 /100 WBC (ref 0–0.2)
PLATELET # BLD AUTO: 157 10*3/MM3 (ref 140–450)
PMV BLD AUTO: 8.6 FL (ref 6–12)
POTASSIUM BLD-SCNC: 4.5 MMOL/L (ref 3.5–4.7)
PROT SERPL-MCNC: 7.4 G/DL (ref 6.3–8)
RBC # BLD AUTO: 4.86 10*6/MM3 (ref 4.14–5.8)
SODIUM BLD-SCNC: 139 MMOL/L (ref 134–145)
WBC NRBC COR # BLD: 6.23 10*3/MM3 (ref 3.4–10.8)

## 2020-02-20 PROCEDURE — 87340 HEPATITIS B SURFACE AG IA: CPT | Performed by: INTERNAL MEDICINE

## 2020-02-20 PROCEDURE — 25010000002 RITUXIMAB 10 MG/ML SOLUTION 50 ML VIAL: Performed by: INTERNAL MEDICINE

## 2020-02-20 PROCEDURE — 96415 CHEMO IV INFUSION ADDL HR: CPT

## 2020-02-20 PROCEDURE — 85025 COMPLETE CBC W/AUTO DIFF WBC: CPT

## 2020-02-20 PROCEDURE — 86706 HEP B SURFACE ANTIBODY: CPT | Performed by: INTERNAL MEDICINE

## 2020-02-20 PROCEDURE — 25010000002 DIPHENHYDRAMINE PER 50 MG: Performed by: INTERNAL MEDICINE

## 2020-02-20 PROCEDURE — 96375 TX/PRO/DX INJ NEW DRUG ADDON: CPT

## 2020-02-20 PROCEDURE — 96413 CHEMO IV INFUSION 1 HR: CPT

## 2020-02-20 PROCEDURE — 25010000002 RITUXIMAB 10 MG/ML SOLUTION 10 ML VIAL: Performed by: INTERNAL MEDICINE

## 2020-02-20 PROCEDURE — 80053 COMPREHEN METABOLIC PANEL: CPT

## 2020-02-20 RX ORDER — SODIUM CHLORIDE 9 MG/ML
250 INJECTION, SOLUTION INTRAVENOUS ONCE
Status: COMPLETED | OUTPATIENT
Start: 2020-02-20 | End: 2020-02-20

## 2020-02-20 RX ORDER — FAMOTIDINE 10 MG/ML
20 INJECTION, SOLUTION INTRAVENOUS ONCE
Status: COMPLETED | OUTPATIENT
Start: 2020-02-20 | End: 2020-02-20

## 2020-02-20 RX ORDER — ACETAMINOPHEN 325 MG/1
650 TABLET ORAL ONCE
Status: COMPLETED | OUTPATIENT
Start: 2020-02-20 | End: 2020-02-20

## 2020-02-20 RX ADMIN — ACETAMINOPHEN 650 MG: 325 TABLET ORAL at 09:07

## 2020-02-20 RX ADMIN — SODIUM CHLORIDE 250 ML: 9 INJECTION, SOLUTION INTRAVENOUS at 09:07

## 2020-02-20 RX ADMIN — FAMOTIDINE 20 MG: 10 INJECTION INTRAVENOUS at 09:08

## 2020-02-20 RX ADMIN — RITUXIMAB 750 MG: 10 INJECTION, SOLUTION INTRAVENOUS at 09:58

## 2020-02-20 RX ADMIN — DIPHENHYDRAMINE HYDROCHLORIDE 50 MG: 50 INJECTION INTRAMUSCULAR; INTRAVENOUS at 09:09

## 2020-02-21 LAB — HBV CORE AB SER DONR QL IA: NEGATIVE

## 2020-02-27 ENCOUNTER — INFUSION (OUTPATIENT)
Dept: ONCOLOGY | Facility: HOSPITAL | Age: 75
End: 2020-02-27

## 2020-02-27 VITALS
HEART RATE: 59 BPM | OXYGEN SATURATION: 96 % | BODY MASS INDEX: 24.77 KG/M2 | WEIGHT: 175 LBS | DIASTOLIC BLOOD PRESSURE: 71 MMHG | SYSTOLIC BLOOD PRESSURE: 107 MMHG | TEMPERATURE: 98.1 F

## 2020-02-27 DIAGNOSIS — C82.08 GRADE 1 FOLLICULAR LYMPHOMA OF LYMPH NODES OF MULTIPLE REGIONS (HCC): Primary | ICD-10-CM

## 2020-02-27 LAB
ALBUMIN SERPL-MCNC: 4.5 G/DL (ref 3.5–5.2)
ALBUMIN/GLOB SERPL: 1.5 G/DL (ref 1.1–2.4)
ALP SERPL-CCNC: 68 U/L (ref 38–116)
ALT SERPL W P-5'-P-CCNC: 12 U/L (ref 0–41)
ANION GAP SERPL CALCULATED.3IONS-SCNC: 15.6 MMOL/L (ref 5–15)
AST SERPL-CCNC: 17 U/L (ref 0–40)
BASOPHILS # BLD AUTO: 0.02 10*3/MM3 (ref 0–0.2)
BASOPHILS NFR BLD AUTO: 0.4 % (ref 0–1.5)
BILIRUB SERPL-MCNC: 0.6 MG/DL (ref 0.2–1.2)
BUN BLD-MCNC: 20 MG/DL (ref 6–20)
BUN/CREAT SERPL: 24.1 (ref 7.3–30)
CALCIUM SPEC-SCNC: 9.4 MG/DL (ref 8.5–10.2)
CHLORIDE SERPL-SCNC: 101 MMOL/L (ref 98–107)
CO2 SERPL-SCNC: 22.4 MMOL/L (ref 22–29)
CREAT BLD-MCNC: 0.83 MG/DL (ref 0.7–1.3)
DEPRECATED RDW RBC AUTO: 43.4 FL (ref 37–54)
EOSINOPHIL # BLD AUTO: 0.2 10*3/MM3 (ref 0–0.4)
EOSINOPHIL NFR BLD AUTO: 3.6 % (ref 0.3–6.2)
ERYTHROCYTE [DISTWIDTH] IN BLOOD BY AUTOMATED COUNT: 13.2 % (ref 12.3–15.4)
GFR SERPL CREATININE-BSD FRML MDRD: 91 ML/MIN/1.73
GLOBULIN UR ELPH-MCNC: 3 GM/DL (ref 1.8–3.5)
GLUCOSE BLD-MCNC: 97 MG/DL (ref 74–124)
HCT VFR BLD AUTO: 42.7 % (ref 37.5–51)
HGB BLD-MCNC: 14.5 G/DL (ref 13–17.7)
IMM GRANULOCYTES # BLD AUTO: 0.03 10*3/MM3 (ref 0–0.05)
IMM GRANULOCYTES NFR BLD AUTO: 0.5 % (ref 0–0.5)
LYMPHOCYTES # BLD AUTO: 1.2 10*3/MM3 (ref 0.7–3.1)
LYMPHOCYTES NFR BLD AUTO: 21.8 % (ref 19.6–45.3)
MCH RBC QN AUTO: 30.2 PG (ref 26.6–33)
MCHC RBC AUTO-ENTMCNC: 34 G/DL (ref 31.5–35.7)
MCV RBC AUTO: 89 FL (ref 79–97)
MONOCYTES # BLD AUTO: 0.42 10*3/MM3 (ref 0.1–0.9)
MONOCYTES NFR BLD AUTO: 7.6 % (ref 5–12)
NEUTROPHILS # BLD AUTO: 3.63 10*3/MM3 (ref 1.7–7)
NEUTROPHILS NFR BLD AUTO: 66.1 % (ref 42.7–76)
NRBC BLD AUTO-RTO: 0 /100 WBC (ref 0–0.2)
PLATELET # BLD AUTO: 161 10*3/MM3 (ref 140–450)
PMV BLD AUTO: 8.8 FL (ref 6–12)
POTASSIUM BLD-SCNC: 4.5 MMOL/L (ref 3.5–4.7)
PROT SERPL-MCNC: 7.5 G/DL (ref 6.3–8)
RBC # BLD AUTO: 4.8 10*6/MM3 (ref 4.14–5.8)
SODIUM BLD-SCNC: 139 MMOL/L (ref 134–145)
WBC NRBC COR # BLD: 5.5 10*3/MM3 (ref 3.4–10.8)

## 2020-02-27 PROCEDURE — 96375 TX/PRO/DX INJ NEW DRUG ADDON: CPT

## 2020-02-27 PROCEDURE — 25010000002 RITUXIMAB 10 MG/ML SOLUTION 50 ML VIAL: Performed by: NURSE PRACTITIONER

## 2020-02-27 PROCEDURE — 96415 CHEMO IV INFUSION ADDL HR: CPT

## 2020-02-27 PROCEDURE — 85025 COMPLETE CBC W/AUTO DIFF WBC: CPT

## 2020-02-27 PROCEDURE — 25010000002 DIPHENHYDRAMINE PER 50 MG: Performed by: INTERNAL MEDICINE

## 2020-02-27 PROCEDURE — 80053 COMPREHEN METABOLIC PANEL: CPT

## 2020-02-27 PROCEDURE — 96413 CHEMO IV INFUSION 1 HR: CPT

## 2020-02-27 PROCEDURE — 36415 COLL VENOUS BLD VENIPUNCTURE: CPT

## 2020-02-27 PROCEDURE — 25010000002 RITUXIMAB 10 MG/ML SOLUTION 10 ML VIAL: Performed by: NURSE PRACTITIONER

## 2020-02-27 RX ORDER — ACETAMINOPHEN 325 MG/1
650 TABLET ORAL ONCE
Status: COMPLETED | OUTPATIENT
Start: 2020-02-27 | End: 2020-02-27

## 2020-02-27 RX ORDER — FAMOTIDINE 10 MG/ML
20 INJECTION, SOLUTION INTRAVENOUS ONCE
Status: COMPLETED | OUTPATIENT
Start: 2020-02-27 | End: 2020-02-27

## 2020-02-27 RX ORDER — SODIUM CHLORIDE 9 MG/ML
250 INJECTION, SOLUTION INTRAVENOUS ONCE
Status: COMPLETED | OUTPATIENT
Start: 2020-02-27 | End: 2020-02-27

## 2020-02-27 RX ADMIN — FAMOTIDINE 20 MG: 10 INJECTION INTRAVENOUS at 08:53

## 2020-02-27 RX ADMIN — SODIUM CHLORIDE 250 ML: 9 INJECTION, SOLUTION INTRAVENOUS at 08:52

## 2020-02-27 RX ADMIN — ACETAMINOPHEN 650 MG: 325 TABLET ORAL at 08:52

## 2020-02-27 RX ADMIN — RITUXIMAB 750 MG: 10 INJECTION, SOLUTION INTRAVENOUS at 09:43

## 2020-02-27 RX ADMIN — DIPHENHYDRAMINE HYDROCHLORIDE 25 MG: 50 INJECTION, SOLUTION INTRAMUSCULAR; INTRAVENOUS at 08:54

## 2020-03-03 ENCOUNTER — TELEPHONE (OUTPATIENT)
Dept: ONCOLOGY | Facility: CLINIC | Age: 75
End: 2020-03-03

## 2020-03-03 ENCOUNTER — TELEPHONE (OUTPATIENT)
Dept: ONCOLOGY | Facility: HOSPITAL | Age: 75
End: 2020-03-03

## 2020-03-03 RX ORDER — PREDNISONE 20 MG/1
20 TABLET ORAL DAILY
Qty: 5 TABLET | Refills: 0 | Status: SHIPPED | OUTPATIENT
Start: 2020-03-03 | End: 2020-05-14

## 2020-03-03 NOTE — TELEPHONE ENCOUNTER
"Spoke to patient's wife and has question about prednisone dose. Prescription was called in earlier today to take 20 mg a day.  Patient states states that bottle says to take 2 a day. Asked wife what mg the tabs are, they are 10 mg. Explained that patient is to take 2 a day to make 20 mg dose.   Wife v/u.  Wife also states that patient has a fever of 101.5 and is having a hard time getting up and standing. She states that he is having \"a bad day.\" states that patient had his 2nd dose of Rituxan last week.. Instructed wife that she should take  to the ER. Wife v/u.     "

## 2020-03-03 NOTE — TELEPHONE ENCOUNTER
PT CALLING C/O HAVING PAIN STARTED SUN. PT THINKS R/T RITUXAN INFUSION. PT REC'D C1 D8 RITUXAN 2/27. PAIN STARTED SUN IN HIS THUMBS. NOW IN SHOULDERS, BACK AND LEGS AND RATES PAIN AT 10 ON SCALE. PT DESCRIBES PAIN AS ACHING THROBBING. STATES THAT HE IS THINKING ABOUT GOING TO ER IT IS SO BAD. HAS TAKEN IBU AND REC'D NO RELIEF. PT HAS APPT HERE THURS TO SEE MD. WILL D/W DR. DUMAS AND GO FROM THERE. PT V/U.     PER DR. DUMAS OK TO SEND RX IN FOR PREDNISONE 20MG DAILY X 5 D. HE WILL SEE PT THURS AS SCHEDULED. RX E-SCRIBED. PT'S WIFE MADE AWARE AND SHE V/U. TOLD HER TO HAVE PT TAKE DOSE TODAY AND TAKE W/ FOOD. SHE V/U.

## 2020-03-05 ENCOUNTER — OFFICE VISIT (OUTPATIENT)
Dept: ONCOLOGY | Facility: CLINIC | Age: 75
End: 2020-03-05

## 2020-03-05 ENCOUNTER — INFUSION (OUTPATIENT)
Dept: ONCOLOGY | Facility: HOSPITAL | Age: 75
End: 2020-03-05

## 2020-03-05 ENCOUNTER — LAB (OUTPATIENT)
Dept: LAB | Facility: HOSPITAL | Age: 75
End: 2020-03-05

## 2020-03-05 VITALS
BODY MASS INDEX: 25.43 KG/M2 | OXYGEN SATURATION: 99 % | DIASTOLIC BLOOD PRESSURE: 63 MMHG | TEMPERATURE: 97.3 F | SYSTOLIC BLOOD PRESSURE: 103 MMHG | HEART RATE: 122 BPM | WEIGHT: 177.6 LBS | HEIGHT: 70 IN

## 2020-03-05 DIAGNOSIS — C82.08 GRADE 1 FOLLICULAR LYMPHOMA OF LYMPH NODES OF MULTIPLE REGIONS (HCC): Primary | ICD-10-CM

## 2020-03-05 DIAGNOSIS — T80.69XA SERUM SICKNESS DUE TO DRUG, INITIAL ENCOUNTER: ICD-10-CM

## 2020-03-05 DIAGNOSIS — C82.08 GRADE 1 FOLLICULAR LYMPHOMA OF LYMPH NODES OF MULTIPLE REGIONS (HCC): ICD-10-CM

## 2020-03-05 DIAGNOSIS — R31.9 HEMATURIA, UNSPECIFIED TYPE: Primary | ICD-10-CM

## 2020-03-05 LAB
ALBUMIN SERPL-MCNC: 3.8 G/DL (ref 3.5–5.2)
ALBUMIN/GLOB SERPL: 1 G/DL (ref 1.1–2.4)
ALP SERPL-CCNC: 56 U/L (ref 38–116)
ALT SERPL W P-5'-P-CCNC: 11 U/L (ref 0–41)
ANION GAP SERPL CALCULATED.3IONS-SCNC: 13.5 MMOL/L (ref 5–15)
AST SERPL-CCNC: 20 U/L (ref 0–40)
BACTERIA UR QL AUTO: ABNORMAL /HPF
BASOPHILS # BLD AUTO: 0.01 10*3/MM3 (ref 0–0.2)
BASOPHILS NFR BLD AUTO: 0.1 % (ref 0–1.5)
BILIRUB SERPL-MCNC: 0.5 MG/DL (ref 0.2–1.2)
BILIRUB UR QL STRIP: ABNORMAL
BUN BLD-MCNC: 31 MG/DL (ref 6–20)
BUN/CREAT SERPL: 33.3 (ref 7.3–30)
CALCIUM SPEC-SCNC: 9.1 MG/DL (ref 8.5–10.2)
CHLORIDE SERPL-SCNC: 102 MMOL/L (ref 98–107)
CHROMATIN AB SERPL-ACNC: 20.3 IU/ML (ref 0–14)
CK SERPL-CCNC: 41 U/L (ref 20–200)
CLARITY UR: CLEAR
CO2 SERPL-SCNC: 23.5 MMOL/L (ref 22–29)
COLOR UR: ABNORMAL
CREAT BLD-MCNC: 0.93 MG/DL (ref 0.7–1.3)
CRP SERPL-MCNC: 13.49 MG/DL (ref 0–0.5)
DEPRECATED RDW RBC AUTO: 40 FL (ref 37–54)
EOSINOPHIL # BLD AUTO: 0.02 10*3/MM3 (ref 0–0.4)
EOSINOPHIL NFR BLD AUTO: 0.2 % (ref 0.3–6.2)
ERYTHROCYTE [DISTWIDTH] IN BLOOD BY AUTOMATED COUNT: 13.1 % (ref 12.3–15.4)
ERYTHROCYTE [SEDIMENTATION RATE] IN BLOOD: 67 MM/HR (ref 0–20)
GFR SERPL CREATININE-BSD FRML MDRD: 80 ML/MIN/1.73
GLOBULIN UR ELPH-MCNC: 3.7 GM/DL (ref 1.8–3.5)
GLUCOSE BLD-MCNC: 114 MG/DL (ref 74–124)
GLUCOSE UR STRIP-MCNC: NEGATIVE MG/DL
HCT VFR BLD AUTO: 41.1 % (ref 37.5–51)
HGB BLD-MCNC: 14.4 G/DL (ref 13–17.7)
HGB UR QL STRIP.AUTO: ABNORMAL
HYALINE CASTS UR QL AUTO: ABNORMAL /LPF
IMM GRANULOCYTES # BLD AUTO: 0.1 10*3/MM3 (ref 0–0.05)
IMM GRANULOCYTES NFR BLD AUTO: 0.9 % (ref 0–0.5)
KETONES UR QL STRIP: NEGATIVE
LEUKOCYTE ESTERASE UR QL STRIP.AUTO: ABNORMAL
LYMPHOCYTES # BLD AUTO: 1.32 10*3/MM3 (ref 0.7–3.1)
LYMPHOCYTES NFR BLD AUTO: 11.4 % (ref 19.6–45.3)
MCH RBC QN AUTO: 29.9 PG (ref 26.6–33)
MCHC RBC AUTO-ENTMCNC: 35 G/DL (ref 31.5–35.7)
MCV RBC AUTO: 85.3 FL (ref 79–97)
MONOCYTES # BLD AUTO: 0.71 10*3/MM3 (ref 0.1–0.9)
MONOCYTES NFR BLD AUTO: 6.1 % (ref 5–12)
MUCOUS THREADS URNS QL MICRO: ABNORMAL /HPF
NEUTROPHILS # BLD AUTO: 9.46 10*3/MM3 (ref 1.7–7)
NEUTROPHILS NFR BLD AUTO: 81.3 % (ref 42.7–76)
NITRITE UR QL STRIP: NEGATIVE
NRBC BLD AUTO-RTO: 0 /100 WBC (ref 0–0.2)
PH UR STRIP.AUTO: 6 [PH] (ref 4.5–8)
PLATELET # BLD AUTO: 142 10*3/MM3 (ref 140–450)
PMV BLD AUTO: 9.3 FL (ref 6–12)
POTASSIUM BLD-SCNC: 3.8 MMOL/L (ref 3.5–4.7)
PROT SERPL-MCNC: 7.5 G/DL (ref 6.3–8)
PROT UR QL STRIP: ABNORMAL
RBC # BLD AUTO: 4.82 10*6/MM3 (ref 4.14–5.8)
RBC # UR: ABNORMAL /HPF
REF LAB TEST METHOD: ABNORMAL
SODIUM BLD-SCNC: 139 MMOL/L (ref 134–145)
SP GR UR STRIP: 1.02 (ref 1–1.03)
SQUAMOUS #/AREA URNS HPF: ABNORMAL /HPF
UROBILINOGEN UR QL STRIP: ABNORMAL
WBC NRBC COR # BLD: 11.62 10*3/MM3 (ref 3.4–10.8)
WBC UR QL AUTO: ABNORMAL /HPF

## 2020-03-05 PROCEDURE — 96361 HYDRATE IV INFUSION ADD-ON: CPT

## 2020-03-05 PROCEDURE — 85652 RBC SED RATE AUTOMATED: CPT | Performed by: INTERNAL MEDICINE

## 2020-03-05 PROCEDURE — 82550 ASSAY OF CK (CPK): CPT | Performed by: INTERNAL MEDICINE

## 2020-03-05 PROCEDURE — 87086 URINE CULTURE/COLONY COUNT: CPT | Performed by: INTERNAL MEDICINE

## 2020-03-05 PROCEDURE — 85025 COMPLETE CBC W/AUTO DIFF WBC: CPT

## 2020-03-05 PROCEDURE — 80053 COMPREHEN METABOLIC PANEL: CPT

## 2020-03-05 PROCEDURE — 81001 URINALYSIS AUTO W/SCOPE: CPT

## 2020-03-05 PROCEDURE — 99215 OFFICE O/P EST HI 40 MIN: CPT | Performed by: INTERNAL MEDICINE

## 2020-03-05 PROCEDURE — 86140 C-REACTIVE PROTEIN: CPT | Performed by: INTERNAL MEDICINE

## 2020-03-05 PROCEDURE — 86431 RHEUMATOID FACTOR QUANT: CPT | Performed by: INTERNAL MEDICINE

## 2020-03-05 PROCEDURE — 36415 COLL VENOUS BLD VENIPUNCTURE: CPT

## 2020-03-05 PROCEDURE — 96374 THER/PROPH/DIAG INJ IV PUSH: CPT

## 2020-03-05 PROCEDURE — 25010000002 METHYLPREDNISOLONE PER 125 MG: Performed by: INTERNAL MEDICINE

## 2020-03-05 RX ORDER — SODIUM CHLORIDE 9 MG/ML
500 INJECTION, SOLUTION INTRAVENOUS ONCE
Status: COMPLETED | OUTPATIENT
Start: 2020-03-05 | End: 2020-03-05

## 2020-03-05 RX ORDER — METHYLPREDNISOLONE SODIUM SUCCINATE 125 MG/2ML
60 INJECTION, POWDER, LYOPHILIZED, FOR SOLUTION INTRAMUSCULAR; INTRAVENOUS ONCE
Status: COMPLETED | OUTPATIENT
Start: 2020-03-05 | End: 2020-03-05

## 2020-03-05 RX ADMIN — SODIUM CHLORIDE 500 ML: 900 INJECTION, SOLUTION INTRAVENOUS at 08:53

## 2020-03-05 RX ADMIN — METHYLPREDNISOLONE SODIUM SUCCINATE 60 MG: 125 INJECTION, POWDER, FOR SOLUTION INTRAMUSCULAR; INTRAVENOUS at 09:12

## 2020-03-05 NOTE — PROGRESS NOTES
REASONS FOR FOLLOWUP:        Remote history of non-Hodgkin lymphoma, in remission for more than 18 years. He received Rituxan at the time of his original diagnosis.     HISTORY OF PRESENT ILLNESSThis patient returns today to the office for followup after he called the office this Tuesday 3 days ago stating that he has had since the Rituxan administration last week and developing 3 days after that severe pain in the joints in his hands, in the wrist, in the shoulders and in his knees to the point that he could not get out of the bed for a couple of days. Thinking that the patient could have serum sickness or something of similar nature associated with Rituxan use the patient was given prednisone. He has taken a dose on Tuesday, a dose on Wednesday and a dose this morning and the symptoms are dramatically better. The patient states that inflammation and the pain in the joints is very much gone, his mobility has improved, he had developed a petechial rash in his lower extremities.     The patient has not had any other skin lesions on any level. He had low grade temperature for a couple of days with nocturnal sweats and since the prednisone initiation the symptoms have finally resolved. He has not had any headache, no blurred vision, no sores in the mouth, no sore throat, no rhinorrhea, no cough, no shortness of breath. His appetite has remained fine, no nausea, vomiting, diarrhea, abdominal pain. His urine became a little bit darker than usual, he is thirsty and he is probably not drinking enough. He has not had any swelling. He has not had any other neurological symptomatology.                     Past Medical History:   Diagnosis Date   • Anxiety    • Aortic insufficiency     Mild   • Atrial fibrillation (CMS/HCC)    • Depression    • Heart murmur    • Hypertension    • Lymphoma, non-Hodgkin's (CMS/HCC)    • Nonrheumatic aortic (valve) insufficiency    • Nonrheumatic aortic (valve) stenosis    • PAF  (paroxysmal atrial fibrillation) (CMS/HCC)    • Polyarthralgia 06/1997   • Sleep apnea    • Stroke (CMS/HCC)     eye         PAST ONCOLOGIC HISTORY:  The patient has had the common childhood illnesses and hemorrhoidectomy in 1976 and 1977.  In December of 1996 he had left axillary lymphadenopathy completely excise (4 cm).   Pathologically this was nodular, well differentiated, lymphocytic lymphoma ([id number]) [facility], flow cytometry showed a monoclonal population, CD20 and CD 10 positive, CD 5 negative, consistent with follicular center cell lymphoma.     This included a negative bone marrow. Chest x-ray in 1998 showed a granuloma in the left upper lobe with calcified nodes in the left hilum.  He is PPD negative by history.          In June of 1997, he was admitted to [facility] by Dr. Thomason and seen in consultation by Dr. Tiwari with acute polyarthralgia and he was briefly treated with steroids.          In August of 1999, he had evidence of recurrent disease with mediastinal, axillary and mesenteric lymphadenopath  y.  Right axillary lymph node biopsy by Dr. Fernandez ([facility] #[id number]) demonstrated PDL nodular lymphoma. The flow cytometry confirmed the fact that he was CD20 positive.          A trial of Leukeran was initiated in August of 1999 without response documented in October.  Therapy with Rituxan initiated on 10/14/99 (LYM-5).  Subsequent scans have shown slow, steady response to Rituxan.     Most recent scans were in January 2001 showing further slight interval decrease in size of the lymph nodes.         Scans in July of 2002 showed no evidence of lymphadenopathy.  Repeat scans in July of 2003 are LESLIE.        Surveillance scans in December 2004 and again in July 2005 showed no evidence of disease.        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  PSA elevation 12/06 requiring probably reevaluation in 2007 by Dr. Jefry Carballo.           Dr. Carballo performed prostate biopsy in March 2007 with no evidence o  f malignancy.  Also in 2007 Mr. Richardson underwent carotid Doppler scans showing no significant carotid stenosis and an echocardiogram showing some calcification of the aortic valve with mild aortic insufficiency.  He continues to followup with [facility].        MRI of brain in July 2009 failed to document any major abnormalities.          CT scans of chest, abdomen, and pelvis failed to document any progression of his lymphoma as per August 2009.           On 08/29/13 the patient states that he has been seen by Dr. Cox, his cardiologist, in regard his heart murmur and this has not changed overall and no changes to medications was advised.          He has been seen by his urologist and because he has a minor rise in his PSA to 5.1 the patient was advised to hav  e a new prostate biopsy and part of the material will be sent for new testing including DNA analysis that would include or exclude once and for all, being the third biopsy on his prostate, that he has or does not have prostate cancer.         Social History     Socioeconomic History   • Marital status:      Spouse name: Yue   • Number of children: 1   • Years of education: Not on file   • Highest education level: Not on file   Occupational History   • Occupation:      Employer: RETIRED   Tobacco Use   • Smoking status: Never Smoker   • Smokeless tobacco: Never Used   Substance and Sexual Activity   • Alcohol use: Yes     Comment: Occasional beer   • Sexual activity: Defer     Family History   Problem Relation Age of Onset   • Heart failure Mother    • Heart failure Father    • Lymphoma Maternal Aunt    • Heart disease Other    • Hypertension Other    • Cancer Other         non-Hodgkin   • Lymphoma Other         Non-Hodgin lymphoma       REVIEW OF SYSTEMS:               General: STATED fever,  chills,  fatigue,no weight changes, no lack of  appetite.  Eyes: no epiphora, xerophthalmia,conjunctivitis, pain, glaucoma, blurred vision, blindness, secretion, photophobia, proptosis, diplopia.  Ears: no otorrhea, tinnitus, otorrhagia, deafness, pain, vertigo.  Nose: no rhinorrhea, no epistaxis, no alteration in perception of odors, no sinuses pressure.  Mouth: no alteration in gums or teeth,  No ulcers, no difficulty with mastication or deglut ion, no odynophagia.  Neck: no masses or pain, no thyroid alterations, no pain in muscles or arteries, no carotid odynia, no crepitation.  Respiratory: no cough, no sputum production,no dyspnea,no trepopnea, no pleuritic pain,no hemoptysis.  Heart: no syncope, no irregularity, no palpitations, no angina,no orthopnea,no paroxysmal nocturnal dyspnea.  Vascular Venous: no tenderness,no edema,no palpable cords,no postphlebitic syndrome, no skin changes no ulcerations.  Vascular Arterial: no distal ischemia, noclaudication, no gangrene, no neuropathic ischemic pain, no skin ulcers, no paleness no cyanosis.  GI: no dysphagia, no odynophagia, no regurgitation, no heartburn,no indigestion,no nausea,no vomiting,no hematemesis ,no melena,no jaundice,no distention, no obstipation,no enterorrhagia,no proctalgia,no anal  lesions, no changes in bowel habits.  : no frequency, no hesitancy, no hematuria, no discharge,no  pain.  Musculoskeletal: stated muscle or tendon pain or inflammation,  joint pain,  edema,  functional limitation,no fasciculations, no mass.  Neurologic: no headache, no seizures, noalterations on Craneal nerves, no motor deficit, no sensory deficit, normal coordination, no alteration in memory,normal orientation, calculation,normal writting, verbal and written language.  Skin: STATED rash,no pruritus no localized lesions.  Psychiatric:  anxiety, no depression,no agitation, no delusions, proper insight.        VITAL SIGNS:  Vitals:    03/05/20 0752   BP: 103/63   Pulse: (!) 122   Temp: 97.3 °F (36.3 °C)   TempSrc:  "Oral   SpO2: 99%   Weight: 80.6 kg (177 lb 9.6 oz)   Height: 179 cm (70.47\")          PAIN:  0 out of 10      ECO         PHYSICAL EXAMINATION:           GENERAL:  Well-developed, well-nourished  Patient  in no acute distress.   SKIN:  Warm, dry ,NO rashes,NO purpura ,LE petechiae.SEE PICTURES, NOT PALPABLE  HEENT:  Pupils were equal and reactive to light and accomodation, conjunctivas non injected, no pterigion, normal extraocular movements, normal visual acuity.   Mouth mucosa was moist, no exudates in oropharynx, normal gum line, normal roof of the mouth and pillars, normal papillations of the tongue.  NECK:  Supple with good range of motion; no thyromegaly or masses, no JVD or bruits, no cervical adenopathies.No carotid arteries pain, no carotid abnormal pulsation , NO arterial dance.  LYMPHATICS:  No cervical, NO supraclavicular, NO axillary,NO epitrochlear , NO inguinal adenopathy.  CHEST:  Normal excursion of both holland thoraces, normal voice fremitus, no subcutaneous emphysema, normal axillas, no rashes or acanthosis nigricans. Lungs clear to percussion and auscultation, normal breath sounds bilaterally, no wheezing,NO crackles NO ronchi, NO stridor, NO rubs.  CARDIAC AND VASCULAR:  normal rate and IRregular rhythm, AFIB CVR with murmur SYSTOLIC GRADE 3 AORTIC AREA,NO rubs NO S3 NO S4 right or left . Normal femoral, popliteal, pedis, brachial and carotid pulses.  ABDOMEN:  Soft, nontender with no organomegaly or masses, no ascites, no collateral circulation,no distention,no Chay sign, no abdominal pain, no inguinal hernias,no umbilical hernia, no abdominal bruits. Normal bowel sounds.  GENITAL: Not  Performed.  EXTREMITIES  AND SPINE:  No clubbing, cyanosis or edema, no deformities or pain .No kyphosis, scoliosis, deformities or pain in spine, ribs or pelvic bone.A careful examination of the shoulders, elbows, wrists, MPs, PIPs, DIPs, knees, hips, spine, ankles disclosed no areas of tenderness, " inflammation or alteration in joint mobility. No muscle pain, no tendon pain.       NEUROLOGICAL:  Patient was awake, alert, oriented to time, person and place.        LABORATORY DATA: Auto Differential   Order: [id number] - Part of Panel Order [id number]   Status:  Final result   Visible to patient:  No (Not Released)   Dx:  Grade 1 follicular lymphoma of lymph ...   Specimen Information: Blood        Component  Ref Range & Units 07:32   WBC  3.40 - 10.80 10*3/mm3 11.62High     RBC  4.14 - 5.80 10*6/mm3 4.82    Hemoglobin  13.0 - 17.7 g/dL 14.4    Hematocrit  37.5 - 51.0 % 41.1    MCV  79.0 - 97.0 fL 85.3    MCH  26.6 - 33.0 pg 29.9    MCHC  31.5 - 35.7 g/dL 35.0    RDW  12.3 - 15.4 % 13.1    RDW-SD  37.0 - 54.0 fl 40.0    MPV  6.0 - 12.0 fL 9.3    Platelets  140 - 450 10*3/mm3 142    Neutrophil %  42.7 - 76.0 % 81.3High     Lymphocyte %  19.6 - 45.3 % 11.4Low     Monocyte %  5.0 - 12.0 % 6.1    Eosinophil %  0.3 - 6.2 % 0.2Low     Basophil %  0.0 - 1.5 % 0.1    Immature Grans %  0.0 - 0.5 % 0.9High     Neutrophils, Absolute  1.70 - 7.00 10*3/mm3 9.46High     Lymphocytes, Absolute  0.70 - 3.10 10*3/mm3 1.32    Monocytes, Absolute  0.10 - 0.90 10*3/mm3 0.71    Eosinophils, Absolute  0.00 - 0.40 10*3/mm3 0.02    Basophils, Absolute  0.00 - 0.20 10*3/mm3 0.01    Immature Grans, Absolute  0.00 - 0.05 10*3/mm3 0.10High                   ASSESSMENT/PLAN:  1. This patient has history of follicular lymphoma that was treated with Rituxan 20 years ago. The patient has been watched close to 20 years after 4 infusions of Rituxan under Lymphoma 5 clinical trial protocol and recently he had a CT scan of his thoracic aortic aneurysm that documented a right axillary lymph node. This was followed up by a biopsy that documents a follicular lymphoma, grade 1 that was BCL-1 negative. This triggered a PET scan that I have reviewed with the patient and wife today. The lymph node visible in the PET scan is not palpable on clinical grounds.  There is a minimal uptake in a lymph node in the right hilar area and there is minimal splenomegaly but the spleen has no different SUV activity than the liver has. Other than that, there is no other activity at any level in his body.     His white count, hemoglobin and platelets are normal. His chemistry profile is normal. His white count differential is normal. He has no B symptoms.     In other words, I think this patient has a recurrent follicular lymphoma that was originally treated 20 years ago. On clinical grounds, I do not feel any palpable lymph nodes at any level and the PET scan talks on its own. I cannot tell if the spleen enlargement is suggestion of lymphoma. Very likely it is the case even though has no notable SUV activity.       Since the previous visit the patient has undergone 2 Rituxan infusions last one given to him last Thursday. On Sunday the patient woke up with joint pain in his hands, elbows, shoulders, knees and impossibility to get out of the bed with local inflammation, low grade fever and he had developed nocturnal sweats. He called the office on Tuesday stating the symptoms, we gave him right away 20 mg of prednisone that he has been taking since then. The symptoms have subsided almost completely still having nocturnal sweats last night.     The patient and wife recall an event before his diagnosis of lymphoma more than 20 years ago that was similar, the patient was admitted to the hospital, had extensive assessment, IV steroids were given, resolved in a question of 3 days never having any other episode again.    My belief is that this patient probably has developed serum sickness associated with the Rituxan infusion. Also in Rituxan literature there is suggestion of polyangiitis and polyarthritis associated with conditions with Rituxan administration. I have given Rituxan to patients for more than 20 years, I never have seen neither any of my partners seen any situation like this  before. Therefore now the patient is better with prednisone but obviously the question is how to proceed.     My suggestion to the patient and wife today is as follows:  1. He will receive 500 cc of saline IV today.  2. We will obtain laboratory parameters that will include a sedimentation rate, a CRP, CMP, urinalysis and we will do a rheumatoid factor as well as CPK.   3. The patient will receive Solu-Medrol 60 mg IV one time.  4. The patient will continue taking his prednisone until this coming Sunday.  5. We will discontinue the Rituxan at this time.  6. I will review him back in a couple of weeks with a CBC and CMP, a repeat sedimentation rate and CRP at that time.  7. The patient is in atrial fibrillation, he has had paroxysmal atrial fibrillation before. He has ventricular response that is controlled today. Hopefully with decrease of the stressors from his issues pertinent to this diagnosis the issue will be taken care of. I will review him before he goes home today and make a statement in regard maybe the need to see Willie Cox MD, his Cardiologist at some point next week.     The patient is aware of all of this and the wife as well.       ADDENDUM: The patient received his Solu-Medrol 60 mg IV, 500 cc of saline, he had very good urinary output, a urinalysis showed 2+ protein with no sediment and the rest of the laboratory parameters including a chemistry were fine. The BUN was elevated probably effect of dehydration due perspiration and fever and effect or prednisone.     The heart rate remains at 86 still in A-fib and we will review him back in a couple of weeks.     The patient will have repeated CBC, CMP, sedimentation rate, CRP and urinalysis in a couple of weeks.     The patient will complete his prednisone on Saturday and hopefully all of these symptoms will be subsiding.

## 2020-03-05 NOTE — PROGRESS NOTES
No rituxan today per Dr. Cohen.  Additional labs ordered.  NS 500ml ordered as well as solu-medrol 60mg IV.  Pt and wife v/u.

## 2020-03-06 LAB
ALBUMIN SERPL-MCNC: 3.2 G/DL (ref 2.9–4.4)
ALBUMIN/GLOB SERPL: 0.9 {RATIO} (ref 0.7–1.7)
ALPHA1 GLOB FLD ELPH-MCNC: 0.6 G/DL (ref 0–0.4)
ALPHA2 GLOB SERPL ELPH-MCNC: 1 G/DL (ref 0.4–1)
B-GLOBULIN SERPL ELPH-MCNC: 0.8 G/DL (ref 0.7–1.3)
BACTERIA SPEC AEROBE CULT: NO GROWTH
GAMMA GLOB SERPL ELPH-MCNC: 1.3 G/DL (ref 0.4–1.8)
GLOBULIN SER CALC-MCNC: 3.7 G/DL (ref 2.2–3.9)
IGA SERPL-MCNC: 312 MG/DL (ref 61–437)
IGG SERPL-MCNC: 1310 MG/DL (ref 700–1600)
IGM SERPL-MCNC: 51 MG/DL (ref 15–143)
INTERPRETATION SERPL IEP-IMP: ABNORMAL
KAPPA LC SERPL-MCNC: 29.4 MG/L (ref 3.3–19.4)
KAPPA LC/LAMBDA SER: 1.56 {RATIO} (ref 0.26–1.65)
LAMBDA LC FREE SERPL-MCNC: 18.9 MG/L (ref 5.7–26.3)
Lab: ABNORMAL
M-SPIKE: ABNORMAL G/DL
PROT SERPL-MCNC: 6.9 G/DL (ref 6–8.5)

## 2020-03-12 ENCOUNTER — APPOINTMENT (OUTPATIENT)
Dept: ONCOLOGY | Facility: HOSPITAL | Age: 75
End: 2020-03-12

## 2020-03-18 ENCOUNTER — TELEPHONE (OUTPATIENT)
Dept: ONCOLOGY | Facility: CLINIC | Age: 75
End: 2020-03-18

## 2020-03-18 NOTE — TELEPHONE ENCOUNTER
On phone with the patient, he is doing fine at that time. He has had complete resolution of his joint pain and fatigue. He has not had any fevers or chills. His appetite is normal. I will postpone the visit for this Friday. We will proceed with a PET scan in 7 weeks to what happened to the lymph node in the right axilla and then review him back in 8 weeks. He agrees to proceed with that.  was informed about this.

## 2020-03-19 DIAGNOSIS — C82.08 GRADE 1 FOLLICULAR LYMPHOMA OF LYMPH NODES OF MULTIPLE REGIONS (HCC): Primary | ICD-10-CM

## 2020-03-20 ENCOUNTER — APPOINTMENT (OUTPATIENT)
Dept: LAB | Facility: HOSPITAL | Age: 75
End: 2020-03-20

## 2020-05-07 ENCOUNTER — HOSPITAL ENCOUNTER (OUTPATIENT)
Dept: PET IMAGING | Facility: HOSPITAL | Age: 75
Discharge: HOME | End: 2020-05-07
Admitting: INTERNAL MEDICINE

## 2020-05-07 ENCOUNTER — HOSPITAL ENCOUNTER (OUTPATIENT)
Dept: PET IMAGING | Facility: HOSPITAL | Age: 75
Discharge: HOME | End: 2020-05-07

## 2020-05-07 DIAGNOSIS — C82.08 GRADE 1 FOLLICULAR LYMPHOMA OF LYMPH NODES OF MULTIPLE REGIONS (HCC): ICD-10-CM

## 2020-05-07 LAB — GLUCOSE BLDC GLUCOMTR-MCNC: 95 MG/DL (ref 70–130)

## 2020-05-07 PROCEDURE — 78815 PET IMAGE W/CT SKULL-THIGH: CPT

## 2020-05-07 PROCEDURE — A9552 F18 FDG: HCPCS | Performed by: INTERNAL MEDICINE

## 2020-05-07 PROCEDURE — 0 FLUDEOXYGLUCOSE F18 SOLUTION: Performed by: INTERNAL MEDICINE

## 2020-05-07 PROCEDURE — 82962 GLUCOSE BLOOD TEST: CPT

## 2020-05-07 RX ADMIN — FLUDEOXYGLUCOSE F18 1 DOSE: 300 INJECTION INTRAVENOUS at 08:55

## 2020-05-14 ENCOUNTER — OFFICE VISIT (OUTPATIENT)
Dept: ONCOLOGY | Facility: CLINIC | Age: 75
End: 2020-05-14

## 2020-05-14 ENCOUNTER — APPOINTMENT (OUTPATIENT)
Dept: LAB | Facility: HOSPITAL | Age: 75
End: 2020-05-14

## 2020-05-14 DIAGNOSIS — T80.69XD SERUM SICKNESS DUE TO DRUG, SUBSEQUENT ENCOUNTER: ICD-10-CM

## 2020-05-14 DIAGNOSIS — C82.08 GRADE 1 FOLLICULAR LYMPHOMA OF LYMPH NODES OF MULTIPLE REGIONS (HCC): Primary | ICD-10-CM

## 2020-05-14 PROCEDURE — 99441 PR PHYS/QHP TELEPHONE EVALUATION 5-10 MIN: CPT | Performed by: INTERNAL MEDICINE

## 2020-05-14 RX ORDER — FLUCONAZOLE 200 MG/1
TABLET ORAL
COMMUNITY
Start: 2020-04-22 | End: 2020-12-18

## 2020-05-14 NOTE — PROGRESS NOTES
REASONS FOR FOLLOWUP:        Remote history of non-Hodgkin lymphoma, in remission for more than 18 years. He received Rituxan at the time of his original diagnosis.     HISTORY OF PRESENT ILLNESS  I HAVE CALLED THIS PATIENT ON THE PHONE TODAY AND VERBALLY HAS CONSENTED ABOUT TELEMEDICINE VISIT     The patient has had complete resolution of the joint pain and fever that he had after the last infusion of Rituxan that in my opinion was a typical case of fever associated with the infusion. Also he had serum sickness that was a typical situation. He has not required any other dose of prednisone.    Physically he feels well. His appetite and weight are stable. His bowel function and urination are normal. He has not had any cardiovascular or respiratory issues and he has not had any infections.     He denies any fever, chills, night sweats, pruritus or fatigue.     He has not had any evidence of adenopathy in the right axilla and he has not developed any lymphedema.    He and his wife are taking every single precaution possible to minimize coronavirus infection.     He has not had any other bouts of depression and actually he is surprised that he is feeling so well in this area in spite of all of the situations that we are having to live now with the pandemia.                   Past Medical History:   Diagnosis Date   • Anxiety    • Aortic insufficiency     Mild   • Atrial fibrillation (CMS/HCC)    • Depression    • Heart murmur    • Hypertension    • Lymphoma, non-Hodgkin's (CMS/HCC)    • Nonrheumatic aortic (valve) insufficiency    • Nonrheumatic aortic (valve) stenosis    • PAF (paroxysmal atrial fibrillation) (CMS/HCC)    • Polyarthralgia 06/1997   • Sleep apnea    • Stroke (CMS/HCC)     eye         PAST ONCOLOGIC HISTORY:  The patient has had the common childhood illnesses and hemorrhoidectomy in 1976 and 1977.  In December of 1996 he had left axillary lymphadenopathy completely excise (4 cm).    Pathologically this was nodular, well differentiated, lymphocytic lymphoma ([id number]) [facility], flow cytometry showed a monoclonal population, CD20 and CD 10 positive, CD 5 negative, consistent with follicular center cell lymphoma.     This included a negative bone marrow. Chest x-ray in 1998 showed a granuloma in the left upper lobe with calcified nodes in the left hilum.  He is PPD negative by history.          In June of 1997, he was admitted to [facility] by Dr. Thomason and seen in consultation by Dr. Tiwari with acute polyarthralgia and he was briefly treated with steroids.          In August of 1999, he had evidence of recurrent disease with mediastinal, axillary and mesenteric lymphadenopath  y.  Right axillary lymph node biopsy by Dr. Fernandez ([facility] #[id number]) demonstrated PDL nodular lymphoma. The flow cytometry confirmed the fact that he was CD20 positive.          A trial of Leukeran was initiated in August of 1999 without response documented in October.  Therapy with Rituxan initiated on 10/14/99 (LYM-5).  Subsequent scans have shown slow, steady response to Rituxan.     Most recent scans were in January 2001 showing further slight interval decrease in size of the lymph nodes.         Scans in July of 2002 showed no evidence of lymphadenopathy.  Repeat scans in July of 2003 are LESLIE.        Surveillance scans in December 2004 and again in July 2005 showed no evidence of disease.        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  PSA elevation 12/06 requiring probably reevaluation in 2007 by Dr. Jefry Carballo.          Dr. Carballo performed prostate biopsy in March 2007 with no evidence o  f malignancy.  Also in 2007 Mr. Richardson underwent carotid Doppler scans showing no significant carotid stenosis and an echocardiogram showing some calcification of the aortic valve with mild aortic insufficiency.  He continues to followup with [facility].        MRI of brain in July 2009 failed to document any major abnormalities.          CT scans of chest, abdomen, and pelvis failed to document any progression of his lymphoma as per August 2009.           On 08/29/13 the patient states that he has been seen by Dr. Cox, his cardiologist, in regard his heart murmur and this has not changed overall and no changes to medications was advised.          He has been seen by his urologist and because he has a minor rise in his PSA to 5.1 the patient was advised to hav  e a new prostate biopsy and part of the material will be sent for new testing including DNA analysis that would include or exclude once and for all, being the third biopsy on his prostate, that he has or does not have prostate cancer.         Social History     Socioeconomic History   • Marital status:      Spouse name: Yue   • Number of children: 1   • Years of education: Not on file   • Highest education level: Not on file   Occupational History   • Occupation:      Employer: RETIRED   Tobacco Use   • Smoking status: Never Smoker   • Smokeless tobacco: Never Used   Substance and Sexual Activity   • Alcohol use: Yes     Comment: Occasional beer   • Sexual activity: Defer     Family History   Problem Relation Age of Onset   • Heart failure Mother    • Heart failure Father    • Lymphoma Maternal Aunt    • Heart disease Other    • Hypertension Other    • Cancer Other         non-Hodgkin   • Lymphoma Other         Non-Hodgin lymphoma       REVIEW OF SYSTEMS:                 General: no fever, no chills, no fatigue,no weight changes, no lack of appetite.  Eyes: no epiphora, xerophthalmia,conjunctivitis, pain, glaucoma, blurred vision, blindness, secretion, photophobia, proptosis, diplopia.  Ears: no otorrhea, tinnitus, otorrhagia, deafness, pain, vertigo.  Nose: no rhinorrhea, no epistaxis, no alteration in perception of odors, no sinuses pressure.  Mouth: no  alteration in gums or teeth,  No ulcers, no difficulty with mastication or deglut ion, no odynophagia.  Neck: no masses or pain, no thyroid alterations, no pain in muscles or arteries, no carotid odynia, no crepitation.  Respiratory: no cough, no sputum production,no dyspnea,no trepopnea, no pleuritic pain,no hemoptysis.  Heart: no syncope, no irregularity, no palpitations, no angina,no orthopnea,no paroxysmal nocturnal dyspnea.  Vascular Venous: no tenderness,no edema,no palpable cords,no postphlebitic syndrome, no skin changes no ulcerations.  Vascular Arterial: no distal ischemia, noclaudication, no gangrene, no neuropathic ischemic pain, no skin ulcers, no paleness no cyanosis.  GI: no dysphagia, no odynophagia, no regurgitation, no heartburn,no indigestion,no nausea,no vomiting,no hematemesis ,no melena,no jaundice,no distention, no obstipation,no enterorrhagia,no proctalgia,no anal  lesions, no changes in bowel habits.  : no frequency, no hesitancy, no hematuria, no discharge,no  pain.  Musculoskeletal: no muscle or tendon pain or inflammation,no  joint pain, no edema, no functional limitation,no fasciculations, no mass.  Neurologic: no headache, no seizures, noalterations on Craneal nerves, no motor deficit, no sensory deficit, normal coordination, no alteration in memory,normal orientation, calculation,normal writting, verbal and written language.  Skin: no rashes,no pruritus no localized lesions.  Psychiatric: no anxiety, no depression,no agitation, no delusions, proper insight.      VITAL SIGNS:  There were no vitals filed for this visit.       PAIN:  0 out of 10      ECO         PHYSICAL EXAMINATION: NORMAL ACTIVITIES OF DAILY LIVING                LABORATORY DATA: F-18 FDG PET FROM SKULL BASE TO MID THIGH WITH PET/CT FUSION     HISTORY: 74-year-old male with grade 1 follicular lymphoma. Restaging.     TECHNIQUE: Radiation dose reduction techniques were utilized, including  automated exposure  control and exposure modulation based on body size.   Blood glucose level at time of injection was 95 mg/dL.  A 6.7 mCi of  F-18 FDG were injected and PET was performed from skull base to mid  thigh. Right antecubital CT was obtained for localization and  attenuation correction. Time at injection 8:55 AM. PET start time 10:18  AM. Compared with PET/CT from 02/07/2020.     FINDINGS: There is marginal decrease in size of the 1.8 x 1.4 cm right  axillary node, previously 2.0 x 1.4 cm. The current maximal SUV is 4.3  and was previously 6.2. There is no significant change in the very small  ill-defined node along the proximal aspect of the right lower lobe  bronchovascular bundle and the maximal SUV is 5.0 and was previously  4.7. There is a new less intense focus of hypermetabolic activity  superiorly at the right hilum with a maximal SUV of 3.9. There is no  suspicious hypermetabolic activity within the neck. There is no  suspicious hypermetabolic activity within the abdomen or pelvis. Mild  splenomegaly is stable and splenic activity is less intense than that of  the liver.     IMPRESSION:  1. There is marginal decrease in size of the 1.8 x 1.4 cm right axillary  node and the node is slightly less intensely hypermetabolic than  previously. There is no significant change in a tiny hypermetabolic node  along the right lower lobe bronchovascular bundle, but there is a new  tiny mildly hypermetabolic node at the right hilum.  2. There is no hypermetabolic lymphadenopathy within the neck, abdomen,  or pelvis. Mild splenomegaly appears stable.     This report was finalized on 5/8/2020 1:48 PM by Dr. Nicki Marquez M.D.               ASSESSMENT/PLAN:  1. This patient has history of follicular lymphoma that was treated with Rituxan 20 years ago. The patient has been watched close to 20 years after 4 infusions of Rituxan under Lymphoma 5 clinical trial protocol and recently he had a CT scan of his thoracic aortic aneurysm that  documented a right axillary lymph node. This was followed up by a biopsy that documents a follicular lymphoma, grade 1 that was BCL-1 negative. This triggered a PET scan that I have reviewed with the patient and wife today. The lymph node visible in the PET scan is not palpable on clinical grounds. There is a minimal uptake in a lymph node in the right hilar area and there is minimal splenomegaly but the spleen has no different SUV activity than the liver has. Other than that, there is no other activity at any level in his body.     His white count, hemoglobin and platelets are normal. His chemistry profile is normal. His white count differential is normal. He has no B symptoms.     In other words, I think this patient has a recurrent follicular lymphoma that was originally treated 20 years ago. On clinical grounds, I do not feel any palpable lymph nodes at any level and the PET scan talks on its own. I cannot tell if the spleen enlargement is suggestion of lymphoma. Very likely it is the case even though has no notable SUV activity.       Since the previous visit the patient has undergone 2 Rituxan infusions last one given to him last Thursday. On Sunday the patient woke up with joint pain in his hands, elbows, shoulders, knees and impossibility to get out of the bed with local inflammation, low grade fever and he had developed nocturnal sweats. He called the office on Tuesday stating the symptoms, we gave him right away 20 mg of prednisone that he has been taking since then. The symptoms have subsided almost completely still having nocturnal sweats last night.     The patient and wife recall an event before his diagnosis of lymphoma more than 20 years ago that was similar, the patient was admitted to the hospital, had extensive assessment, IV steroids were given, resolved in a question of 3 days never having any other episode again.    My belief is that this patient probably has developed serum sickness associated  with the Rituxan infusion. Also in Rituxan literature there is suggestion of polyangiitis and polyarthritis associated with conditions with Rituxan administration. I have given Rituxan to patients for more than 20 years, I never have seen neither any of my partners seen any situation like this before. Therefore now the patient is better with prednisone but obviously the question is how to proceed.     I personally reviewed the PET scan stated above and compared this with the previous PET scan in 02/2020.     The patient on clinical grounds after telephone conversation with him today has no symptoms that will indicate any lymphoma recurrence.    The PET scan shows decrease in the size of the lymph node in the right axilla and also decrease in the SUV activity. There is a new area of minimal SUV activity in the right hilar area. The rest of his PET scan is very much negative or normal.    Given the present circumstances I do believe that I would like to review the patient in the office in 3-4 weeks, do clinical analysis and then define if we could pursue some other intervention. There are multiple other possibilities that the patient could do in regard lymphoma including consideration to use venetoclax, consideration to use Imbruvica. I will be less inclined for Imbruvica given his valvular heart disease.    I discussed this with the patient in detail.    Total duration of the telephone conversation with the patient 7 minutes 30 seconds.         I DISCUSSED WITH PATIENT IN DETAIL FORMS TO DECREASE CHANCES OF CORONAVIRUS INFECTION INCLUDING ISOLATION, PROPER HAND HIGIENE, AVOID PUBLIC PLACES  WITH CROWDS, FOLLOW  CDC RECOMENDATIONS, AND KEEP PERSONAL AND SOCIAL RESPONSIBILITY, WARE A MASK IN PUBLIC PLACES.  PATIENT IS AWARE THIS INFECTION COULD HAVE SEVERE CONSEQUENCES TO PERSONAL HEALTH AND FAMILY RAMIFICATIONS OF THIS.

## 2020-06-19 ENCOUNTER — TELEPHONE (OUTPATIENT)
Dept: ONCOLOGY | Facility: CLINIC | Age: 75
End: 2020-06-19

## 2020-06-19 NOTE — TELEPHONE ENCOUNTER
"Patient has appointment on 6/23.  He would like to have his wife attend his appointment with him.  He said that she has always come to his appointments and he needs her there as a \"second set of ears\".      [phone]  "

## 2020-06-23 ENCOUNTER — LAB (OUTPATIENT)
Dept: LAB | Facility: HOSPITAL | Age: 75
End: 2020-06-23

## 2020-06-23 ENCOUNTER — TELEPHONE (OUTPATIENT)
Dept: ONCOLOGY | Facility: CLINIC | Age: 75
End: 2020-06-23

## 2020-06-23 ENCOUNTER — OFFICE VISIT (OUTPATIENT)
Dept: ONCOLOGY | Facility: CLINIC | Age: 75
End: 2020-06-23

## 2020-06-23 VITALS
HEIGHT: 70 IN | BODY MASS INDEX: 25.04 KG/M2 | WEIGHT: 174.9 LBS | HEART RATE: 77 BPM | DIASTOLIC BLOOD PRESSURE: 81 MMHG | SYSTOLIC BLOOD PRESSURE: 153 MMHG | RESPIRATION RATE: 18 BRPM | OXYGEN SATURATION: 98 % | TEMPERATURE: 98 F

## 2020-06-23 DIAGNOSIS — T80.69XD SERUM SICKNESS DUE TO DRUG, SUBSEQUENT ENCOUNTER: ICD-10-CM

## 2020-06-23 DIAGNOSIS — C82.08 GRADE 1 FOLLICULAR LYMPHOMA OF LYMPH NODES OF MULTIPLE REGIONS (HCC): Primary | ICD-10-CM

## 2020-06-23 DIAGNOSIS — C82.08 GRADE 1 FOLLICULAR LYMPHOMA OF LYMPH NODES OF MULTIPLE REGIONS (HCC): ICD-10-CM

## 2020-06-23 LAB
ALBUMIN SERPL-MCNC: 4.6 G/DL (ref 3.5–5.2)
ALBUMIN/GLOB SERPL: 1.6 G/DL (ref 1.1–2.4)
ALP SERPL-CCNC: 68 U/L (ref 38–116)
ALT SERPL W P-5'-P-CCNC: 10 U/L (ref 0–41)
ANION GAP SERPL CALCULATED.3IONS-SCNC: 12.4 MMOL/L (ref 5–15)
AST SERPL-CCNC: 16 U/L (ref 0–40)
BASOPHILS # BLD AUTO: 0.03 10*3/MM3 (ref 0–0.2)
BASOPHILS NFR BLD AUTO: 0.4 % (ref 0–1.5)
BILIRUB SERPL-MCNC: 0.6 MG/DL (ref 0.2–1.2)
BUN BLD-MCNC: 16 MG/DL (ref 6–20)
BUN/CREAT SERPL: 17 (ref 7.3–30)
CALCIUM SPEC-SCNC: 9.5 MG/DL (ref 8.5–10.2)
CHLORIDE SERPL-SCNC: 103 MMOL/L (ref 98–107)
CO2 SERPL-SCNC: 24.6 MMOL/L (ref 22–29)
CREAT BLD-MCNC: 0.94 MG/DL (ref 0.7–1.3)
DEPRECATED RDW RBC AUTO: 44.8 FL (ref 37–54)
EOSINOPHIL # BLD AUTO: 0.14 10*3/MM3 (ref 0–0.4)
EOSINOPHIL NFR BLD AUTO: 2 % (ref 0.3–6.2)
ERYTHROCYTE [DISTWIDTH] IN BLOOD BY AUTOMATED COUNT: 13.9 % (ref 12.3–15.4)
GFR SERPL CREATININE-BSD FRML MDRD: 79 ML/MIN/1.73
GLOBULIN UR ELPH-MCNC: 2.9 GM/DL (ref 1.8–3.5)
GLUCOSE BLD-MCNC: 107 MG/DL (ref 74–124)
HCT VFR BLD AUTO: 43.6 % (ref 37.5–51)
HGB BLD-MCNC: 14.7 G/DL (ref 13–17.7)
IMM GRANULOCYTES # BLD AUTO: 0.04 10*3/MM3 (ref 0–0.05)
IMM GRANULOCYTES NFR BLD AUTO: 0.6 % (ref 0–0.5)
LDH SERPL-CCNC: 154 U/L (ref 99–259)
LYMPHOCYTES # BLD AUTO: 1.47 10*3/MM3 (ref 0.7–3.1)
LYMPHOCYTES NFR BLD AUTO: 20.9 % (ref 19.6–45.3)
MCH RBC QN AUTO: 30 PG (ref 26.6–33)
MCHC RBC AUTO-ENTMCNC: 33.7 G/DL (ref 31.5–35.7)
MCV RBC AUTO: 89 FL (ref 79–97)
MONOCYTES # BLD AUTO: 0.36 10*3/MM3 (ref 0.1–0.9)
MONOCYTES NFR BLD AUTO: 5.1 % (ref 5–12)
NEUTROPHILS # BLD AUTO: 5 10*3/MM3 (ref 1.7–7)
NEUTROPHILS NFR BLD AUTO: 71 % (ref 42.7–76)
NRBC BLD AUTO-RTO: 0 /100 WBC (ref 0–0.2)
PLATELET # BLD AUTO: 181 10*3/MM3 (ref 140–450)
PMV BLD AUTO: 8.5 FL (ref 6–12)
POTASSIUM BLD-SCNC: 4.6 MMOL/L (ref 3.5–4.7)
PROT SERPL-MCNC: 7.5 G/DL (ref 6.3–8)
RBC # BLD AUTO: 4.9 10*6/MM3 (ref 4.14–5.8)
SODIUM BLD-SCNC: 140 MMOL/L (ref 134–145)
WBC NRBC COR # BLD: 7.04 10*3/MM3 (ref 3.4–10.8)

## 2020-06-23 PROCEDURE — 83615 LACTATE (LD) (LDH) ENZYME: CPT

## 2020-06-23 PROCEDURE — 36415 COLL VENOUS BLD VENIPUNCTURE: CPT

## 2020-06-23 PROCEDURE — 99214 OFFICE O/P EST MOD 30 MIN: CPT | Performed by: INTERNAL MEDICINE

## 2020-06-23 PROCEDURE — 80053 COMPREHEN METABOLIC PANEL: CPT

## 2020-06-23 PROCEDURE — 85025 COMPLETE CBC W/AUTO DIFF WBC: CPT

## 2020-06-23 NOTE — PROGRESS NOTES
REASONS FOR FOLLOWUP:        Remote history of non-Hodgkin lymphoma, in remission for more than 18 years. He received Rituxan at the time of his original diagnosis.     HISTORY OF PRESENT ILLNESS    PATIENT WAS CALLED THE DAY BEFORE BY THE OFFICE TO ASK FOR SYMPTOMS THAT COULD BE CONSISTENT WITH CORONAVIRUS INFECTION, AND BEING NEGATIVE WAS SCHEDULED TO BE SEEN IN THE OFFICE TODAY. SIMILAR QUESTIONING TODAY INCLUDING, CHILLS, FEVER, NEW COUGH, SHORTNESS OF BREATH, DIARRHEA,DIFFUSE BODY ACHES  AND CHANGES IN SMELL OR TASTE WERE NEGATIVE.DURING THE VISIT WITH THE PATIENT TODAY , PATIENT HAD FACE MASK, I HAD PROPPER PROTECTIVE EQUIPMENT, AND I DID HAND HYGIENE WITH SOAP AND WATER BEFORE AND AFTER THE VISIT.    This patient returns today to the office for follow up. He is here today complaining of anxiety and again depression given the pandemia. He is not getting to the point of depression as bad as he used to be before 2 years ago. His appetite has remained good, his bowel function and urination are normal. He has not had any new cardiovascular or respiratory issues. He decided to get tested for COVID being negative in absence of any symptoms. He has not had any fever, chills, night sweats, pruritus, adenopathies. He has not had any rash in the skin or pain related to previous lymphoma. He has not had any other exacerbation of his rheumatological symptomatology after receiving Rituxan several months ago. This represented serum sickness.               Past Medical History:   Diagnosis Date   • Anxiety    • Aortic insufficiency     Mild   • Atrial fibrillation (CMS/HCC)    • Depression    • Heart murmur    • Hypertension    • Lymphoma, non-Hodgkin's (CMS/HCC)    • Nonrheumatic aortic (valve) insufficiency    • Nonrheumatic aortic (valve) stenosis    • PAF (paroxysmal atrial fibrillation) (CMS/HCC)    • Polyarthralgia 06/1997   • Sleep apnea    • Stroke (CMS/HCC)     eye         PAST ONCOLOGIC HISTORY:   The patient has had the common childhood illnesses and hemorrhoidectomy in 1976 and 1977.  In December of 1996 he had left axillary lymphadenopathy completely excise (4 cm).   Pathologically this was nodular, well differentiated, lymphocytic lymphoma ([id number]) [facility], flow cytometry showed a monoclonal population, CD20 and CD 10 positive, CD 5 negative, consistent with follicular center cell lymphoma.     This included a negative bone marrow. Chest x-ray in 1998 showed a granuloma in the left upper lobe with calcified nodes in the left hilum.  He is PPD negative by history.          In June of 1997, he was admitted to [facility] by Dr. Thomason and seen in consultation by Dr. Tiwari with acute polyarthralgia and he was briefly treated with steroids.          In August of 1999, he had evidence of recurrent disease with mediastinal, axillary and mesenteric lymphadenopath  y.  Right axillary lymph node biopsy by Dr. Fernandez ([facility] #[id number]) demonstrated PDL nodular lymphoma. The flow cytometry confirmed the fact that he was CD20 positive.          A trial of Leukeran was initiated in August of 1999 without response documented in October.  Therapy with Rituxan initiated on 10/14/99 (LYM-5).  Subsequent scans have shown slow, steady response to Rituxan.     Most recent scans were in January 2001 showing further slight interval decrease in size of the lymph nodes.         Scans in July of 2002 showed no evidence of lymphadenopathy.  Repeat scans in July of 2003 are LESLIE.        Surveillance scans in December 2004 and again in July 2005 showed no evidence of disease.        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  PSA elevation 12/06 requiring probably reevaluation in 2007 by Dr. Jefry Carballo.          Dr. Carballo performed prostate biopsy in March 2007 with no evidence o  f malignancy.       Social History     Socioeconomic History   • Marital  status:      Spouse name: Yue   • Number of children: 1   • Years of education: Not on file   • Highest education level: Not on file   Occupational History   • Occupation:      Employer: RETIRED   Tobacco Use   • Smoking status: Never Smoker   • Smokeless tobacco: Never Used   Substance and Sexual Activity   • Alcohol use: Yes     Comment: Occasional beer   • Sexual activity: Defer     Family History   Problem Relation Age of Onset   • Heart failure Mother    • Heart failure Father    • Lymphoma Maternal Aunt    • Heart disease Other    • Hypertension Other    • Cancer Other         non-Hodgkin   • Lymphoma Other         Non-Hodgin lymphoma       REVIEW OF SYSTEMS:                     General: no fever, no chills,  fatigue,no weight changes, no lack of appetite.  Eyes: no epiphora, xerophthalmia,conjunctivitis, pain, glaucoma, blurred vision, blindness, secretion, photophobia, proptosis, diplopia.  Ears: no otorrhea, tinnitus, otorrhagia, deafness, pain, vertigo.  Nose: no rhinorrhea, no epistaxis, no alteration in perception of odors, no sinuses pressure.  Mouth: no alteration in gums or teeth,  No ulcers, no difficulty with mastication or deglut ion, no odynophagia.  Neck: no masses or pain, no thyroid alterations, no pain in muscles or arteries, no carotid odynia, no crepitation.  Respiratory: no cough, no sputum production,no dyspnea,no trepopnea, no pleuritic pain,no hemoptysis.  Heart: no syncope, no irregularity, no palpitations, no angina,no orthopnea,no paroxysmal nocturnal dyspnea.  Vascular Venous: no tenderness,no edema,no palpable cords,no postphlebitic syndrome, no skin changes no ulcerations.  Vascular Arterial: no distal ischemia, noclaudication, no gangrene, no neuropathic ischemic pain, no skin ulcers, no paleness no cyanosis.  GI: no dysphagia, no odynophagia, no regurgitation, no heartburn,no indigestion,no nausea,no vomiting,no hematemesis ,no melena,no jaundice,no  "distention, no obstipation,no enterorrhagia,no proctalgia,no anal  lesions, no changes in bowel habits.  : no frequency, no hesitancy, no hematuria, no discharge,no  pain.  Musculoskeletal: no muscle or tendon pain or inflammation,no  joint pain, no edema, no functional limitation,no fasciculations, no mass.  Neurologic: no headache, no seizures, noalterations on Craneal nerves, no motor deficit, no sensory deficit, normal coordination, no alteration in memory,normal orientation, calculation,normal writting, verbal and written language.  Skin: no rashes,no pruritus no localized lesions.  Psychiatric:  anxiety,  depression,no agitation, no delusions, proper insight.      VITAL SIGNS:  Vitals:    20 1351   BP: 153/81   Pulse: 77   Resp: 18   Temp: 98 °F (36.7 °C)   TempSrc: Skin   SpO2: 98%   Weight: 79.3 kg (174 lb 14.4 oz)   Height: 179 cm (70.47\")          PAIN:  0 out of 10      ECO       DR DUMAS RECHECKED BP R /80 LEFT /80  PHYSICAL EXAMINATION:GENERAL ASPECT: IN GOOD HEALTH WALKING THROUGH TE OFFICE NO DIFFICULTIES, NO PAIN.PROPER NUTRITION.WELL KEPT PHYSICALLY.  EYES : NOT JAUNDICED, PUPILS WERE EQUAL.  HEART: REGULAR AORTIC SYSTOLIC GRADE 2/6 MURMUR , NO GALLOPS, NO RUBS.  ABDOMEN: NOT DISTENDED, NO PAIN, NO LIVER OR SPLEEN ENLARGEMENT, NO ASCITES, NO COLLATERAL CIRCULATION.  EXTREMITIES: NO EDEMA, NO PAIN, NO CLUBBING, NO DEFORMITIES.  NEUROLOGIC: NORMAL CONVERSATION, MEMORY , SPEECH AND GAIT.  SKIN: NO LESIONS.  LYMPHATICS: NONE DOCUMENTED IN NECK AXILLAS GROINS EPITROCHLEAR AREAS            LABORATORY DATA: CBC Auto Differential   Order: [id number] - Part of Panel Order [id number]   Status:  Final result   Visible to patient:  No (Not Released)   Dx:  Serum sickness due to drug, subsequen...   Specimen Information: Blood        Component  Ref Range & Units 13:31   WBC  3.40 - 10.80 10*3/mm3 7.04    RBC  4.14 - 5.80 10*6/mm3 4.90    Hemoglobin  13.0 - 17.7 g/dL 14.7  "   Hematocrit  37.5 - 51.0 % 43.6    MCV  79.0 - 97.0 fL 89.0    MCH  26.6 - 33.0 pg 30.0    MCHC  31.5 - 35.7 g/dL 33.7    RDW  12.3 - 15.4 % 13.9    RDW-SD  37.0 - 54.0 fl 44.8    MPV  6.0 - 12.0 fL 8.5    Platelets  140 - 450 10*3/mm3 181    Neutrophil %  42.7 - 76.0 % 71.0    Lymphocyte %  19.6 - 45.3 % 20.9    Monocyte %  5.0 - 12.0 % 5.1    Eosinophil %  0.3 - 6.2 % 2.0    Basophil %  0.0 - 1.5 % 0.4    Immature Grans %  0.0 - 0.5 % 0.6High     Neutrophils, Absolute  1.70 - 7.00 10*3/mm3 5.00    Lymphocytes, Absolute  0.70 - 3.10 10*3/mm3 1.47    Monocytes, Absolute  0.10 - 0.90 10*3/mm3 0.36    Eosinophils, Absolute  0.00 - 0.40 10*3/mm3 0.14    Basophils, Absolute  0.00 - 0.20 10*3/mm3 0.03                    ASSESSMENT/PLAN:  1. This patient has history of follicular lymphoma that was treated with Rituxan 20 years ago. The patient has been watched close to 20 years after 4 infusions of Rituxan under Lymphoma 5 clinical trial protocol and recently he had a CT scan of his thoracic aortic aneurysm that documented a right axillary lymph node. This was followed up by a biopsy that documents a follicular lymphoma, grade 1 that was BCL-1 negative. This triggered a PET scan that I have reviewed with the patient and wife today. The lymph node visible in the PET scan is not palpable on clinical grounds. There is a minimal uptake in a lymph node in the right hilar area and there is minimal splenomegaly but the spleen has no different SUV activity than the liver has. Other than that, there is no other activity at any level in his body.     His white count, hemoglobin and platelets are normal. His chemistry profile is normal. His white count differential is normal. He has no B symptoms.     In other words, I think this patient has a recurrent follicular lymphoma that was originally treated 20 years ago. On clinical grounds, I do not feel any palpable lymph nodes at any level and the PET scan talks on its own. I cannot  tell if the spleen enlargement is suggestion of lymphoma. Very likely it is the case even though has no notable SUV activity.       Since the previous visit the patient has undergone 2 Rituxan infusions last one given to him last Thursday. On Sunday the patient woke up with joint pain in his hands, elbows, shoulders, knees and impossibility to get out of the bed with local inflammation, low grade fever and he had developed nocturnal sweats. He called the office on Tuesday stating the symptoms, we gave him right away 20 mg of prednisone that he has been taking since then. The symptoms have subsided almost completely still having nocturnal sweats last night.     The patient and wife recall an event before his diagnosis of lymphoma more than 20 years ago that was similar, the patient was admitted to the hospital, had extensive assessment, IV steroids were given, resolved in a question of 3 days never having any other episode again.    My belief is that this patient probably has developed serum sickness associated with the Rituxan infusion. Also in Rituxan literature there is suggestion of polyangiitis and polyarthritis associated with conditions with Rituxan administration. I have given Rituxan to patients for more than 20 years, I never have seen neither any of my partners seen any situation like this before. Therefore now the patient is better with prednisone but obviously the question is how to proceed.       The patient returned to the office on 06/23/2020. He is still worried about his possible lymphoma recurrence even though he has no symptoms and his physical exam is normal. He was able to receive only 3 infusions of Rituxan. The 2nd one triggered an autoimmune phenomenon of serum sickness like picture that required prednisone use. Today his physical exam is unremarkable besides his aortic murmur that has remained unchanged for many years. He has no cardiovascular symptomatology. He has anxiety and depression  not as bad as it used to be before related to the pandemia. He continues taking his psychiatric medication. This produces some xerostomia, not too much that I can do about it.     His white count, hemoglobin and platelets are normal today. His white count differential is normal. Chemistry profile is pending.     RECOMMENDATIONS:  1. We will proceed with peripheral blood flow cytometry today to see if we fish out any abnormal population of B cells in peripheral circulation. I doubt that that will be the case but the patient is insistent that something is wrong with him.   2. I will proceed with a CT scan of the chest, abdomen and pelvis in 7 weeks along with a CBC, CMP and LDH and I will review him back in 8 weeks.    I reassured him that as long as I can tell he does not look any different today than he looked after completion of his lymphoma treatment close to 20 years ago.     I rechecked his blood pressure today. The original reading by medical assistant was high, rechecking in both arms it was normal.     This defines the degree of anxiety that he handles when he comes around here. Unfortunately that is the case for most of the patients who have had malignancies in the past.    I will call him with the report of his blood sugar. He is convinced now that he has diabetes and that is why he has xerostomia. I doubt that that is the case.     The patient was tested for COVID with no symptoms being negative. His wife remains negative and not tested at all. They have not had any exposure. They want to see their children in [place], they have not been exposed and I think he needs to have social contact with his family. It is the only substantial issue that he loves to do being in contact with his daughter and grandson.   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 I pointed out to him that I would not like to see him doing that.       I DISCUSSED WITH PATIENT IN DETAIL FORMS TO DECREASE CHANCES OF CORONAVIRUS INFECTION INCLUDING ISOLATION, PROPER HAND HIGIENE, AVOID PUBLIC PLACES  WITH CROWDS, FOLLOW  CDC RECOMENDATIONS, AND KEEP PERSONAL AND SOCIAL RESPONSIBILITY, WARE A MASK IN PUBLIC PLACES.  PATIENT IS AWARE THIS INFECTION COULD HAVE SEVERE CONSEQUENCES TO PERSONAL HEALTH AND FAMILY RAMIFICATIONS OF THIS.

## 2020-06-30 LAB — REF LAB TEST METHOD: NORMAL

## 2020-08-04 ENCOUNTER — LAB (OUTPATIENT)
Dept: LAB | Facility: HOSPITAL | Age: 75
End: 2020-08-04

## 2020-08-04 ENCOUNTER — HOSPITAL ENCOUNTER (OUTPATIENT)
Dept: PET IMAGING | Facility: HOSPITAL | Age: 75
Discharge: HOME | End: 2020-08-04
Admitting: INTERNAL MEDICINE

## 2020-08-04 DIAGNOSIS — C82.08 GRADE 1 FOLLICULAR LYMPHOMA OF LYMPH NODES OF MULTIPLE REGIONS (HCC): ICD-10-CM

## 2020-08-04 LAB
ALBUMIN SERPL-MCNC: 4.6 G/DL (ref 3.5–5.2)
ALBUMIN/GLOB SERPL: 1.5 G/DL (ref 1.1–2.4)
ALP SERPL-CCNC: 67 U/L (ref 38–116)
ALT SERPL W P-5'-P-CCNC: 10 U/L (ref 0–41)
ANION GAP SERPL CALCULATED.3IONS-SCNC: 12.3 MMOL/L (ref 5–15)
AST SERPL-CCNC: 18 U/L (ref 0–40)
BASOPHILS # BLD AUTO: 0.03 10*3/MM3 (ref 0–0.2)
BASOPHILS NFR BLD AUTO: 0.5 % (ref 0–1.5)
BILIRUB SERPL-MCNC: 0.6 MG/DL (ref 0.2–1.2)
BUN SERPL-MCNC: 19 MG/DL (ref 6–20)
BUN/CREAT SERPL: 20.9 (ref 7.3–30)
CALCIUM SPEC-SCNC: 9.5 MG/DL (ref 8.5–10.2)
CHLORIDE SERPL-SCNC: 102 MMOL/L (ref 98–107)
CO2 SERPL-SCNC: 25.7 MMOL/L (ref 22–29)
CREAT BLDA-MCNC: 0.9 MG/DL (ref 0.6–1.3)
CREAT SERPL-MCNC: 0.91 MG/DL (ref 0.7–1.3)
DEPRECATED RDW RBC AUTO: 46.5 FL (ref 37–54)
EOSINOPHIL # BLD AUTO: 0.21 10*3/MM3 (ref 0–0.4)
EOSINOPHIL NFR BLD AUTO: 3.4 % (ref 0.3–6.2)
ERYTHROCYTE [DISTWIDTH] IN BLOOD BY AUTOMATED COUNT: 13.7 % (ref 12.3–15.4)
GFR SERPL CREATININE-BSD FRML MDRD: 82 ML/MIN/1.73
GLOBULIN UR ELPH-MCNC: 3.1 GM/DL (ref 1.8–3.5)
GLUCOSE SERPL-MCNC: 75 MG/DL (ref 74–124)
HCT VFR BLD AUTO: 46 % (ref 37.5–51)
HGB BLD-MCNC: 15.3 G/DL (ref 13–17.7)
IMM GRANULOCYTES # BLD AUTO: 0.05 10*3/MM3 (ref 0–0.05)
IMM GRANULOCYTES NFR BLD AUTO: 0.8 % (ref 0–0.5)
LDH SERPL-CCNC: 177 U/L (ref 99–259)
LYMPHOCYTES # BLD AUTO: 1.35 10*3/MM3 (ref 0.7–3.1)
LYMPHOCYTES NFR BLD AUTO: 22.1 % (ref 19.6–45.3)
MCH RBC QN AUTO: 31 PG (ref 26.6–33)
MCHC RBC AUTO-ENTMCNC: 33.3 G/DL (ref 31.5–35.7)
MCV RBC AUTO: 93.1 FL (ref 79–97)
MONOCYTES # BLD AUTO: 0.36 10*3/MM3 (ref 0.1–0.9)
MONOCYTES NFR BLD AUTO: 5.9 % (ref 5–12)
NEUTROPHILS NFR BLD AUTO: 4.1 10*3/MM3 (ref 1.7–7)
NEUTROPHILS NFR BLD AUTO: 67.3 % (ref 42.7–76)
NRBC BLD AUTO-RTO: 0 /100 WBC (ref 0–0.2)
PLATELET # BLD AUTO: 200 10*3/MM3 (ref 140–450)
PMV BLD AUTO: 9.4 FL (ref 6–12)
POTASSIUM SERPL-SCNC: 4.2 MMOL/L (ref 3.5–4.7)
PROT SERPL-MCNC: 7.7 G/DL (ref 6.3–8)
RBC # BLD AUTO: 4.94 10*6/MM3 (ref 4.14–5.8)
SODIUM SERPL-SCNC: 140 MMOL/L (ref 134–145)
WBC # BLD AUTO: 6.1 10*3/MM3 (ref 3.4–10.8)

## 2020-08-04 PROCEDURE — 0 DIATRIZOATE MEGLUMINE & SODIUM PER 1 ML: Performed by: INTERNAL MEDICINE

## 2020-08-04 PROCEDURE — 80053 COMPREHEN METABOLIC PANEL: CPT

## 2020-08-04 PROCEDURE — 71260 CT THORAX DX C+: CPT

## 2020-08-04 PROCEDURE — 85025 COMPLETE CBC W/AUTO DIFF WBC: CPT

## 2020-08-04 PROCEDURE — 25010000002 IOPAMIDOL 61 % SOLUTION: Performed by: INTERNAL MEDICINE

## 2020-08-04 PROCEDURE — 82565 ASSAY OF CREATININE: CPT

## 2020-08-04 PROCEDURE — 74177 CT ABD & PELVIS W/CONTRAST: CPT

## 2020-08-04 PROCEDURE — 36415 COLL VENOUS BLD VENIPUNCTURE: CPT

## 2020-08-04 PROCEDURE — 83615 LACTATE (LD) (LDH) ENZYME: CPT

## 2020-08-04 RX ADMIN — DIATRIZOATE MEGLUMINE AND DIATRIZOATE SODIUM 30 ML: 660; 100 LIQUID ORAL; RECTAL at 07:30

## 2020-08-04 RX ADMIN — IOPAMIDOL 85 ML: 612 INJECTION, SOLUTION INTRAVENOUS at 08:15

## 2020-08-11 ENCOUNTER — OFFICE VISIT (OUTPATIENT)
Dept: ONCOLOGY | Facility: CLINIC | Age: 75
End: 2020-08-11

## 2020-08-11 ENCOUNTER — APPOINTMENT (OUTPATIENT)
Dept: LAB | Facility: HOSPITAL | Age: 75
End: 2020-08-11

## 2020-08-11 VITALS
RESPIRATION RATE: 20 BRPM | TEMPERATURE: 97.7 F | HEIGHT: 70 IN | BODY MASS INDEX: 25.17 KG/M2 | SYSTOLIC BLOOD PRESSURE: 135 MMHG | OXYGEN SATURATION: 97 % | WEIGHT: 175.8 LBS | DIASTOLIC BLOOD PRESSURE: 73 MMHG | HEART RATE: 72 BPM

## 2020-08-11 DIAGNOSIS — C82.08 GRADE 1 FOLLICULAR LYMPHOMA OF LYMPH NODES OF MULTIPLE REGIONS (HCC): Primary | ICD-10-CM

## 2020-08-11 PROCEDURE — 99214 OFFICE O/P EST MOD 30 MIN: CPT | Performed by: INTERNAL MEDICINE

## 2020-08-11 NOTE — PROGRESS NOTES
REASONS FOR FOLLOWUP:        Remote history of non-Hodgkin lymphoma, in remission for more than 18 years. He received Rituxan at the time of his original diagnosis.     HISTORY OF PRESENT ILLNESS      PATIENT WAS CALLED THE DAY BEFORE BY THE OFFICE TO ASK FOR SYMPTOMS THAT COULD BE CONSISTENT WITH CORONAVIRUS INFECTION, AND BEING NEGATIVE WAS SCHEDULED TO BE SEEN IN THE OFFICE TODAY. SIMILAR QUESTIONING TODAY INCLUDING, CHILLS, FEVER, NEW COUGH, SHORTNESS OF BREATH, DIARRHEA,DIFFUSE BODY ACHES  AND CHANGES IN SMELL OR TASTE WERE NEGATIVE.THE PATIENT DENIED ANY CONTACT WITH PERSONS WHO WERE POSITIVE FOR COVID, AND PATIENT IS NOT IN CATEGORY OF HIGH RISK BEHAVIOR TO ACQUIRE COVID.    DURING THE VISIT WITH THE PATIENT TODAY , PATIENT HAD FACE MASK, MY MEDICAL ASSISTANT AND I  HAD PROPPER PROTECTIVE EQUIPMENT, AND I DID HAND HYGIENE WITH SOAP AND WATER BEFORE AND AFTER THE VISIT.    This patient returns today to the office for followup. He is here today stating that he continues doing fantastic. He has not had any fever, chills, night sweats, pruritus, adenopathy, cough, sputum production, shortness of breath. His appetite and weight remain stable, normal. Normal bowel function. Normal urination. No pain in the joints. No rash. He continues having some element of dryness in his oral mucosa. He has no dryness in his eyes. He has no alteration in mouth per se. Maybe he is going to need to have a dental implant. He has not had any fever or infections. No B symptoms.                Past Medical History:   Diagnosis Date   • Anxiety    • Aortic insufficiency     Mild   • Atrial fibrillation (CMS/HCC)    • Depression    • Heart murmur    • Hypertension    • Lymphoma, non-Hodgkin's (CMS/HCC)    • Nonrheumatic aortic (valve) insufficiency    • Nonrheumatic aortic (valve) stenosis    • PAF (paroxysmal atrial fibrillation) (CMS/HCC)    • Polyarthralgia 06/1997   • Sleep apnea    • Stroke (CMS/HCC)     eye          PAST ONCOLOGIC HISTORY:  The patient has had the common childhood illnesses and hemorrhoidectomy in 1976 and 1977.  In December of 1996 he had left axillary lymphadenopathy completely excise (4 cm).   Pathologically this was nodular, well differentiated, lymphocytic lymphoma ([id number]) [facility], flow cytometry showed a monoclonal population, CD20 and CD 10 positive, CD 5 negative, consistent with follicular center cell lymphoma.     This included a negative bone marrow. Chest x-ray in 1998 showed a granuloma in the left upper lobe with calcified nodes in the left hilum.  He is PPD negative by history.          In June of 1997, he was admitted to [facility] by Dr. Thomason and seen in consultation by Dr. Tiwari with acute polyarthralgia and he was briefly treated with steroids.          In August of 1999, he had evidence of recurrent disease with mediastinal, axillary and mesenteric lymphadenopath  y.  Right axillary lymph node biopsy by Dr. Fernandez ([facility] #[id number]) demonstrated PDL nodular lymphoma. The flow cytometry confirmed the fact that he was CD20 positive.          A trial of Leukeran was initiated in August of 1999 without response documented in October.  Therapy with Rituxan initiated on 10/14/99 (LYM-5).  Subsequent scans have shown slow, steady response to Rituxan.     Most recent scans were in January 2001 showing further slight interval decrease in size of the lymph nodes.         Scans in July of 2002 showed no evidence of lymphadenopathy.  Repeat scans in July of 2003 are LESLIE.        Surveillance scans in December 2004 and again in July 2005 showed no evidence of disease.        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  PSA elevation 12/06 requiring probably reevaluation in 2007 by Dr. Jefry Carballo.          Dr. Carballo performed prostate biopsy in March 2007 with no evidence o  f malignancy.       Social History      Socioeconomic History   • Marital status:      Spouse name: Yue   • Number of children: 1   • Years of education: Not on file   • Highest education level: Not on file   Occupational History   • Occupation:      Employer: RETIRED   Tobacco Use   • Smoking status: Never Smoker   • Smokeless tobacco: Never Used   Substance and Sexual Activity   • Alcohol use: Yes     Comment: Occasional beer   • Sexual activity: Defer     Family History   Problem Relation Age of Onset   • Heart failure Mother    • Heart failure Father    • Lymphoma Maternal Aunt    • Heart disease Other    • Hypertension Other    • Cancer Other         non-Hodgkin   • Lymphoma Other         Non-Hodgin lymphoma   No Known Allergies   Current Outpatient Medications on File Prior to Visit   Medication Sig Dispense Refill   • amLODIPine (NORVASC) 2.5 MG tablet Take 2.5 mg by mouth Daily.  5   • aspirin (ASPIRIN ADULT LOW STRENGTH) 81 MG EC tablet Take  by mouth daily.     • cyanocobalamin (VITAMIN B-12) 500 MCG tablet Take  by mouth.     • finasteride (PROSCAR) 5 MG tablet Take 5 mg by mouth daily.     • fluconazole (DIFLUCAN) 200 MG tablet TAKE ONE TABLET BY MOUTH WEEKLY UNTIL NAILS GROW OUT     • FLUoxetine (PROzac) 10 MG capsule Take 10 mg by mouth Daily.     • mirtazapine (REMERON) 15 MG tablet Take 15 mg by mouth Daily.     • nebivolol (BYSTOLIC) 5 MG tablet TAKE 1 TABLET BY MOUTH EVERY DAY       No current facility-administered medications on file prior to visit.      REVIEW OF SYSTEMS:   General: no fever, no chills, no fatigue,no weight changes, no lack of appetite.  Eyes: no epiphora, xerophthalmia,conjunctivitis, pain, glaucoma, blurred vision, blindness, secretion, photophobia, proptosis, diplopia.  Ears: no otorrhea, tinnitus, otorrhagia, deafness, pain, vertigo.  Nose: no rhinorrhea, no epistaxis, no alteration in perception of odors, no sinuses pressure.  Mouth: no alteration in gums or teeth,  No ulcers, no difficulty  "with mastication or deglut ion, no odynophagia.xerostomia  Neck: no masses or pain, no thyroid alterations, no pain in muscles or arteries, no carotid odynia, no crepitation.  Respiratory: no cough, no sputum production,no dyspnea,no trepopnea, no pleuritic pain,no hemoptysis.  Heart: no syncope, no irregularity, no palpitations, no angina,no orthopnea,no paroxysmal nocturnal dyspnea.  Vascular Venous: no tenderness,no edema,no palpable cords,no postphlebitic syndrome, no skin changes no ulcerations.  Vascular Arterial: no distal ischemia, noclaudication, no gangrene, no neuropathic ischemic pain, no skin ulcers, no paleness no cyanosis.  GI: no dysphagia, no odynophagia, no regurgitation, no heartburn,no indigestion,no nausea,no vomiting,no hematemesis ,no melena,no jaundice,no distention, no obstipation,no enterorrhagia,no proctalgia,no anal  lesions, no changes in bowel habits.  : no frequency, no hesitancy, no hematuria, no discharge,no  pain.  Musculoskeletal: no muscle or tendon pain or inflammation,no  joint pain, no edema, no functional limitation,no fasciculations, no mass.  Neurologic: no headache, no seizures, noalterations on Craneal nerves, no motor deficit, no sensory deficit, normal coordination, no alteration in memory,normal orientation, calculation,normal writting, verbal and written language.  Skin: no rashes,no pruritus no localized lesions.  Psychiatric: no anxiety, no depression,no agitation, no delusions, proper insight.          VITAL SIGNS:  Vitals:    08/11/20 1508   BP: 135/73   Pulse: 72   Resp: 20   Temp: 97.7 °F (36.5 °C)   TempSrc: Temporal   SpO2: 97%   Weight: 79.7 kg (175 lb 12.8 oz)   Height: 179 cm (70.47\")          PAIN:  0 out of 10        Exam: HAVE PERSONALLY REVIEWED THE HISTORY OF THE PRESENT ILLNESS, PAST MEDICAL HISTORY, FAMILY HISTORY, SOCIAL HISTORY, ALLERGIES, MEDICATIONS STATED ABOVE IN THE OFFICE NOTE FROM TODAY.        GENERAL:  Well-developed, well-nourished  " Patient  in no acute distress.   SKIN:  Warm, dry ,NO rashes,NO purpura ,NO petechiae.  HEENT:  Pupils were equal and reactive to light and accomodation, conjunctivas non injected, no pterigion, normal extraocular movements, normal visual acuity.   NECK:  Supple with good range of motion; no thyromegaly or masses, no JVD or bruits, no cervical adenopathies.No carotid arteries pain, no carotid abnormal pulsation , NO arterial dance.  LYMPHATICS:  No cervical, NO supraclavicular, NO axillary,NO epitrochlear , NO inguinal adenopathy.  CARDIAC   normal rate and regular rhythm, with systolic g3/6 basal murmur,NO rubs NO S3 NO S4 right or left . Normal femoral, popliteal, pedis, brachial and carotid pulses.  VASCULAR ARTERIAL: normal carotids,brachial,radial,femoral,popliteal, pedis pulses , no bruits.no paleness or cyanosis, no pain, no edema, no numbness, no gangrene.  VASCULAR VENOUS: no cyanosis, collateral circulation, varicosities, edema, palpable cords, pain, erythema.  ABDOMEN:  Soft, nontender with no hepatomegaly, no splenomegaly,no masses, no ascites, no collateral circulation,no distention,no Chay sign, no abdominal pain, no inguinal hernias,no umbilical hernia, no abdominal bruits. Normal bowel sounds.  GENITAL: Not  Performed.  EXTREMITIES  AND SPINE:  No clubbing, cyanosis or edema, no deformities or pain .No kyphosis, scoliosis, deformities or pain in spine, ribs or pelvic bone.  NEUROLOGICAL:  Patient was awake, alert, oriented to time, person and place.          LABORATORY DATAAuto Differential   Order: [id number] - Part of Panel Order [id number]   Status:  Final result   Visible to patient:  Yes (MyChart)   Dx:  Grade 1 follicular lymphoma of lymph ...   Specimen Information: Blood        Component  Ref Range & Units 7d ago   WBC  3.40 - 10.80 10*3/mm3 6.10    RBC  4.14 - 5.80 10*6/mm3 4.94    Hemoglobin  13.0 - 17.7 g/dL 15.3    Hematocrit  37.5 - 51.0 % 46.0    MCV  79.0 - 97.0 fL 93.1    MCH  26.6 -  33.0 pg 31.0    MCHC  31.5 - 35.7 g/dL 33.3    RDW  12.3 - 15.4 % 13.7    RDW-SD  37.0 - 54.0 fl 46.5    MPV  6.0 - 12.0 fL 9.4    Platelets  140 - 450 10*3/mm3 200    Neutrophil %  42.7 - 76.0 % 67.3    Lymphocyte %  19.6 - 45.3 % 22.1    Monocyte %  5.0 - 12.0 % 5.9    Eosinophil %  0.3 - 6.2 % 3.4    Basophil %  0.0 - 1.5 % 0.5    Immature Grans %  0.0 - 0.5 % 0.8High     Neutrophils, Absolute  1.70 - 7.00 10*3/mm3 4.10    Lymphocytes, Absolute  0.70 - 3.10 10*3/mm3 1.35    Monocytes, Absolute  0.10 - 0.90 10*3/mm3 0.36    Eosinophils, Absolute  0.00 - 0.40 10*3/mm3 0.21    Basophils, Absolute  0.00 - 0.20 10*3/mm3 0.03    Immature Grans, Absolute  0.00 - 0.05 10*3/mm3 0.05    nRBC  0.0 - 0.2 /100 WBC 0.0            CT CHEST, ABDOMEN AND PELVIS WITH CONTRAST     HISTORY: 74-year-old male with follow-up for grade 1 follicular  lymphoma. Restaging.      TECHNIQUE: Axial CT images of the chest abdomen and pelvis were obtained  following administration of intravenous and oral contrast. Coronal and  sagittal reconstructions were then obtained.     COMPARISON: PET/CT dated 05/07/2020.      FINDINGS:     CT CHEST: The right axillary lymph node demonstrates further interval  decrease in size now measuring 1.1 cm in short axis dimension compared  to prior measurement of 1.4 cm. No enlarging axillary or mediastinal  lymph nodes are identified. No pleural or pericardial effusion is seen.  The central airways are patent without endobronchial lesion. Minimal  atelectasis or scarring is seen within the posterior right upper lobe.  No new suspicious pulmonary nodule is seen. Bones are unremarkable.     CT ABDOMEN AND PELVIS:A mildly enlarged right external iliac/pelvic wall  lymph node measuring up to 1 cm is unchanged from most recent PET/CT as  well as imaging dating back to 2017. No new or enlarging lymph nodes are  identified within the retroperitoneum.     The spleen measures 14.5 cm in craniocaudal dimension without  interim  change from prior imaging. No focal splenic lesion. The pancreas is  normal without ductal dilatation. Bilateral adrenal glands are normal.  Cortical lesions demonstrated bilaterally within the kidneys are most  suggestive of simple cysts. Nonobstructing calculi are seen within  bilateral kidneys. No hydronephrosis. The prostate gland is enlarged and  indents the bladder base. Layering bladder calculus is present.     IMPRESSION:  1.  Further mild interval decrease in size of the right axillary lymph  node. No new enlarging lymphadenopathy is seen within the chest, abdomen  and pelvis. Mild splenomegaly and prominent iliac lymph node is without  interval change.  2.  Enlarged prostate gland. Urinary bladder calculus. Punctate  nonobstructing bilateral nephroliths.     Radiation dose reduction techniques were utilized, including automated  exposure control and exposure modulation based on body size.     This report was finalized on 8/4/2020 2:27 PM by Dr. Brennan Noguera M.D.     Flow Cytometry, Blood [[id number]] (Order [id number])   Order   Date: 6/23/2020 Department: [facility] CBC GROUP: [facility] BLOOD DISORDERS AND CANCER Ordering/Authorizing: Mateo Cohen MD   Reprint Order Requisition     Flow Cytometry, Blood (Order #[id number]) on 6/23/20       Flow Cytometry, Blood   Order: [id number]   Status:  Final result   Visible to patient:  Yes (MyChart)   Next appt:  None   Dx:  Grade 1 follicular lymphoma of lymph ...   Specimen Information: Blood        Component 1mo ago   Reference Lab Report Flow Cytometry,Blood    Resulting Agency [facility]         Specimen Collected: 06/23/20 13:35 Last Resulted: 06/30/20 18:10 Lab Flowsheet Order Details View Encounter Lab and Collection Details Routing Result History         Scans on Order [id number]     Scan on 6/30/2020 1810 by Lina Enamorado L: Flow Cytometry,BloodScan on 6/30/2020 1810 by Lina Enamorado: Flow Cytometry,Blood                            ASSESSMENT/PLAN:  1. This patient has history of follicular lymphoma that was treated with Rituxan 20 years ago. The patient has been watched close to 20 years after 4 infusions of Rituxan under Lymphoma 5 clinical trial protocol and recently he had a CT scan of his thoracic aortic aneurysm that documented a right axillary lymph node. This was followed up by a biopsy that documents a follicular lymphoma, grade 1 that was BCL-1 negative. This triggered a PET scan that I have reviewed with the patient and wife today. The lymph node visible in the PET scan is not palpable on clinical grounds. There is a minimal uptake in a lymph node in the right hilar area and there is minimal splenomegaly but the spleen has no different SUV activity than the liver has. Other than that, there is no other activity at any level in his body.     His white count, hemoglobin and platelets are normal. His chemistry profile is normal. His white count differential is normal. He has no B symptoms.     In other words, I think this patient has a recurrent follicular lymphoma that was originally treated 20 years ago. On clinical grounds, I do not feel any palpable lymph nodes at any level and the PET scan talks on its own. I cannot tell if the spleen enlargement is suggestion of lymphoma. Very likely it is the case even though has no notable SUV activity.       Since the previous visit the patient has undergone 2 Rituxan infusions last one given to him last Thursday. On Sunday the patient woke up with joint pain in his hands, elbows, shoulders, knees and impossibility to get out of the bed with local inflammation, low grade fever and he had developed nocturnal sweats. He called the office on Tuesday stating the symptoms, we gave him right away 20 mg of prednisone that he has been taking since then. The symptoms have subsided almost completely still having nocturnal sweats last night.     The patient and wife recall an event  before his diagnosis of lymphoma more than 20 years ago that was similar, the patient was admitted to the hospital, had extensive assessment, IV steroids were given, resolved in a question of 3 days never having any other episode again.    My belief is that this patient probably has developed serum sickness associated with the Rituxan infusion. Also in Rituxan literature there is suggestion of polyangiitis and polyarthritis associated with conditions with Rituxan administration. I have given Rituxan to patients for more than 20 years, I never have seen neither any of my partners seen any situation like this before. Therefore now the patient is better with prednisone but obviously the question is how to proceed.         The patient was further reviewed on 08/11/2020. At this time the patient is completely asymptomatic, fully functional, doing anything that he pleases and taking all of the precautions to minimize coronavirus infection. He tells me that his son-in-law and his granddaughter were tested positive for the virus. His daughter was tested negative. They live in [place]. They have not been in physical contact with them.     From the point of view of the CT scans of the chest, abdomen and pelvis to my eyes I feel very minimal if any adenopathy in the right axilla. The so-called mediastinal lymph nodes are resolved. There is minimal splenomegaly that is unchanged and has been present for a long time and otherwise no other alterations. His white count, hemoglobin, platelets, white count differential, chemistry profile and LDH remain normal. The PERIPHERAL BLOOD flow cytometry in 06/2020 failed to document a monoclonal protein population of cells in peripheral circulation.     As far as I can tell today, 2 rounds of Rituxan probably took care of the lymph node in the right axilla and mediastinum. The patient is asymptomatic today. I think he is back into remission and he looks terrific. His blood work is  normal. His CT scans are fine.     RECOMMENDATIONS: He will keep his appointment in 12/2020 for clinical assessment, CBC and CMP. Otherwise, right now I find no need for radiological assessment on him at this time.    I DISCUSSED WITH PATIENT IN DETAIL FORMS TO DECREASE CHANCES OF CORONAVIRUS INFECTION INCLUDING ISOLATION, PROPER HAND HIGIENE, AVOID PUBLIC PLACES  WITH CROWDS, FOLLOW  CDC RECOMENDATIONS, AND KEEP PERSONAL AND SOCIAL RESPONSIBILITY, WARE A MASK IN PUBLIC PLACES.  PATIENT IS AWARE THIS INFECTION COULD HAVE SEVERE CONSEQUENCES TO PERSONAL HEALTH AND FAMILY RAMIFICATIONS OF THIS.

## 2020-10-27 ENCOUNTER — TELEPHONE (OUTPATIENT)
Dept: ONCOLOGY | Facility: CLINIC | Age: 75
End: 2020-10-27

## 2020-10-27 NOTE — TELEPHONE ENCOUNTER
PT CALLED BECAUSE HE DOES NOT WANT ANOTHER SCAN DONE THIS YEAR AND HE NEEDS ONE FOR HIS CARDIOLOGIST, KEITH POLANCO MD, AT [facility] (PHONE: [phone]). HE WAS TOLD MAY BE A RADIOLOGIST CAN LOOK AT THE SCAN WE DID 8-4-2020 TO LOOK AT HIS HEART ANEURYSM TO SEE IF IT IS GROWING ANY BIGGER. THE IMAGE WILL NEED TO BE READ FURTHER.    THE PT WANTS TO KNOW WHAT NEEDS TO BE DONE TO GET THIS PROCESSED.    PT CAN BE REACHED AT:  [phone]

## 2020-11-04 ENCOUNTER — TELEPHONE (OUTPATIENT)
Dept: ONCOLOGY | Facility: CLINIC | Age: 75
End: 2020-11-04

## 2020-11-04 NOTE — TELEPHONE ENCOUNTER
Abdomen Pelvis With Contrast  Order: [id number]  Status:  Edited Result - FINAL   Visible to patient:  Yes ([facility]) Dx:  Grade 1 follicular lymphoma of lymph ...  Details    Reading Physician Reading Date Result Priority   Brennan Noguera MD  [phone] 8/4/2020       Addenda     ADDENDUM:  An addendum is being added to add aortic sizes.      The AP diameter of the ascending thoracic aorta is 4.5 cm and that is  not significantly changed from prior imaging in 2018. The aortic  diameter just proximal to the origin of the right brachiocephalic is 3.7  cm. There is a direct origin of the left vertebral artery as third  branch of the aortic arch. The descending thoracic aorta is tortuous  with a midthoracic diameter of 2.9 cm.      This report was finalized on 11/4/2020 9:50 AM by Dr. Brennan Noguera M.D.      Signed by Brennan Noguera MD on 11/4/2020 09:50              This patient has called the office requesting an addendum to the dictation of the CT scan done in 06/2020 in regard to his lymphoma and specifically the addendum was related to the size of the aorta given the fact that he has a thoracic aortic aneurysm. I requested the addendum by the radiologist, this has become available to me and I have called the patient to notify him that comparing the CT scan of 2018 and the one that was done in 06/2020 the aorta size has not changed.     I have mailed 2 copies of the report to the patient's home today and also I electronically sent a copy of this to the patient's primary nurse practitioner as well to the patient's cardiothoracic surgeon at [facility], Ascencion Severino MD. I asked the patient to contact the office of Dr. Severino to be sure that he got the copy of this report and so forth.

## 2020-12-18 ENCOUNTER — OFFICE VISIT (OUTPATIENT)
Dept: ONCOLOGY | Facility: CLINIC | Age: 75
End: 2020-12-18

## 2020-12-18 ENCOUNTER — LAB (OUTPATIENT)
Dept: LAB | Facility: HOSPITAL | Age: 75
End: 2020-12-18

## 2020-12-18 VITALS
WEIGHT: 175.8 LBS | BODY MASS INDEX: 25.17 KG/M2 | RESPIRATION RATE: 19 BRPM | DIASTOLIC BLOOD PRESSURE: 70 MMHG | TEMPERATURE: 97.3 F | SYSTOLIC BLOOD PRESSURE: 134 MMHG | HEART RATE: 65 BPM | HEIGHT: 70 IN | OXYGEN SATURATION: 98 %

## 2020-12-18 DIAGNOSIS — C82.08 GRADE 1 FOLLICULAR LYMPHOMA OF LYMPH NODES OF MULTIPLE REGIONS (HCC): Primary | ICD-10-CM

## 2020-12-18 DIAGNOSIS — C82.08 GRADE 1 FOLLICULAR LYMPHOMA OF LYMPH NODES OF MULTIPLE REGIONS (HCC): ICD-10-CM

## 2020-12-18 LAB
ALBUMIN SERPL-MCNC: 4.4 G/DL (ref 3.5–5.2)
ALBUMIN/GLOB SERPL: 1.5 G/DL (ref 1.1–2.4)
ALP SERPL-CCNC: 67 U/L (ref 38–116)
ALT SERPL W P-5'-P-CCNC: 10 U/L (ref 0–41)
ANION GAP SERPL CALCULATED.3IONS-SCNC: 10.2 MMOL/L (ref 5–15)
AST SERPL-CCNC: 15 U/L (ref 0–40)
BASOPHILS # BLD AUTO: 0.04 10*3/MM3 (ref 0–0.2)
BASOPHILS NFR BLD AUTO: 0.6 % (ref 0–1.5)
BILIRUB SERPL-MCNC: 0.5 MG/DL (ref 0.2–1.2)
BUN SERPL-MCNC: 17 MG/DL (ref 6–20)
BUN/CREAT SERPL: 17.5 (ref 7.3–30)
CALCIUM SPEC-SCNC: 9.4 MG/DL (ref 8.5–10.2)
CHLORIDE SERPL-SCNC: 103 MMOL/L (ref 98–107)
CO2 SERPL-SCNC: 26.8 MMOL/L (ref 22–29)
CREAT SERPL-MCNC: 0.97 MG/DL (ref 0.7–1.3)
CRP SERPL-MCNC: 0.27 MG/DL (ref 0–0.5)
DEPRECATED RDW RBC AUTO: 42.8 FL (ref 37–54)
EOSINOPHIL # BLD AUTO: 0.12 10*3/MM3 (ref 0–0.4)
EOSINOPHIL NFR BLD AUTO: 1.7 % (ref 0.3–6.2)
ERYTHROCYTE [DISTWIDTH] IN BLOOD BY AUTOMATED COUNT: 13.2 % (ref 12.3–15.4)
ERYTHROCYTE [SEDIMENTATION RATE] IN BLOOD: 5 MM/HR (ref 0–20)
GFR SERPL CREATININE-BSD FRML MDRD: 76 ML/MIN/1.73
GLOBULIN UR ELPH-MCNC: 2.9 GM/DL (ref 1.8–3.5)
GLUCOSE SERPL-MCNC: 78 MG/DL (ref 74–124)
HCT VFR BLD AUTO: 42.6 % (ref 37.5–51)
HGB BLD-MCNC: 14.6 G/DL (ref 13–17.7)
IMM GRANULOCYTES # BLD AUTO: 0.05 10*3/MM3 (ref 0–0.05)
IMM GRANULOCYTES NFR BLD AUTO: 0.7 % (ref 0–0.5)
LYMPHOCYTES # BLD AUTO: 1.14 10*3/MM3 (ref 0.7–3.1)
LYMPHOCYTES NFR BLD AUTO: 15.9 % (ref 19.6–45.3)
MCH RBC QN AUTO: 30.7 PG (ref 26.6–33)
MCHC RBC AUTO-ENTMCNC: 34.3 G/DL (ref 31.5–35.7)
MCV RBC AUTO: 89.7 FL (ref 79–97)
MONOCYTES # BLD AUTO: 0.39 10*3/MM3 (ref 0.1–0.9)
MONOCYTES NFR BLD AUTO: 5.4 % (ref 5–12)
NEUTROPHILS NFR BLD AUTO: 5.45 10*3/MM3 (ref 1.7–7)
NEUTROPHILS NFR BLD AUTO: 75.7 % (ref 42.7–76)
NRBC BLD AUTO-RTO: 0 /100 WBC (ref 0–0.2)
PLATELET # BLD AUTO: 161 10*3/MM3 (ref 140–450)
PMV BLD AUTO: 8.2 FL (ref 6–12)
POTASSIUM SERPL-SCNC: 4.7 MMOL/L (ref 3.5–4.7)
PROT SERPL-MCNC: 7.3 G/DL (ref 6.3–8)
RBC # BLD AUTO: 4.75 10*6/MM3 (ref 4.14–5.8)
SODIUM SERPL-SCNC: 140 MMOL/L (ref 134–145)
WBC # BLD AUTO: 7.19 10*3/MM3 (ref 3.4–10.8)

## 2020-12-18 PROCEDURE — 85652 RBC SED RATE AUTOMATED: CPT | Performed by: INTERNAL MEDICINE

## 2020-12-18 PROCEDURE — 80053 COMPREHEN METABOLIC PANEL: CPT

## 2020-12-18 PROCEDURE — 86140 C-REACTIVE PROTEIN: CPT | Performed by: INTERNAL MEDICINE

## 2020-12-18 PROCEDURE — 85025 COMPLETE CBC W/AUTO DIFF WBC: CPT

## 2020-12-18 PROCEDURE — 36415 COLL VENOUS BLD VENIPUNCTURE: CPT

## 2020-12-18 PROCEDURE — 99213 OFFICE O/P EST LOW 20 MIN: CPT | Performed by: INTERNAL MEDICINE

## 2020-12-18 NOTE — PROGRESS NOTES
REASONS FOR FOLLOWUP:        Remote history of non-Hodgkin lymphoma, in remission for more than 18 years. He received Rituxan at the time of his original diagnosis.     HISTORY OF PRESENT ILLNESS      PATIENT WAS CALLED THE DAY BEFORE BY THE OFFICE TO ASK FOR SYMPTOMS THAT COULD BE CONSISTENT WITH CORONAVIRUS INFECTION, AND BEING NEGATIVE WAS SCHEDULED TO BE SEEN IN THE OFFICE TODAY. SIMILAR QUESTIONING TODAY INCLUDING, CHILLS, FEVER, NEW COUGH, SHORTNESS OF BREATH, DIARRHEA,DIFFUSE BODY ACHES  AND CHANGES IN SMELL OR TASTE WERE NEGATIVE.THE PATIENT DENIED ANY CONTACT WITH PERSONS WHO WERE POSITIVE FOR COVID, AND PATIENT IS NOT IN CATEGORY OF HIGH RISK BEHAVIOR TO ACQUIRE COVID.    DURING THE VISIT WITH THE PATIENT TODAY , PATIENT HAD FACE MASK, MY MEDICAL ASSISTANT AND I  HAD PROPPER PROTECTIVE EQUIPMENT, AND I DID HAND HYGIENE WITH SOAP AND WATER BEFORE AND AFTER THE VISIT.    This patient returns today to the office for follow up of his previous history of follicular lymphoma that was treated originally with Rituxan close to 20 years ago. The patient has had a minimal relapse in the right axilla documented through a CT scan, not documented clinically. He was treated briefly with a couple of doses of Rituxan and he developed a very severe reaction to the medicine including serum sickness requiring prednisone and the Rituxan was discontinued. The patient is here today with no symptoms pertinent to lymphoma with no fever, chills, night sweats, pruritus, adenopathies. Appetite and weight are stable. Bowel function and urination are normal. No cardiovascular or respiratory issues. No recent infections. No urinary symptomatology.                   Past Medical History:   Diagnosis Date   • Anxiety    • Aortic insufficiency     Mild   • Atrial fibrillation (CMS/HCC)    • Depression    • Heart murmur    • Hypertension    • Lymphoma, non-Hodgkin's (CMS/HCC)    • Nonrheumatic aortic (valve) insufficiency     • Nonrheumatic aortic (valve) stenosis    • PAF (paroxysmal atrial fibrillation) (CMS/HCC)    • Polyarthralgia 06/1997   • Sleep apnea    • Stroke (CMS/HCC)     eye     Past Surgical History:   Procedure Laterality Date   • AXILLARY LYMPH NODE BIOPSY/EXCISION Left 12/1996   • CATARACT EXTRACTION     • HEMORRHOIDECTOMY  1976, 1977   • PROSTATE BIOPSY  03/2007         PAST ONCOLOGIC HISTORY:  The patient has had the common childhood illnesses and hemorrhoidectomy in 1976 and 1977.  In December of 1996 he had left axillary lymphadenopathy completely excise (4 cm).   Pathologically this was nodular, well differentiated, lymphocytic lymphoma ([id number]) [facility], flow cytometry showed a monoclonal population, CD20 and CD 10 positive, CD 5 negative, consistent with follicular center cell lymphoma.     This included a negative bone marrow. Chest x-ray in 1998 showed a granuloma in the left upper lobe with calcified nodes in the left hilum.  He is PPD negative by history.          In June of 1997, he was admitted to [facility] by Dr. Thomason and seen in consultation by Dr. Tiwari with acute polyarthralgia and he was briefly treated with steroids.          In August of 1999, he had evidence of recurrent disease with mediastinal, axillary and mesenteric lymphadenopath  y.  Right axillary lymph node biopsy by Dr. Fernandez ([facility] #[id number]) demonstrated PDL nodular lymphoma. The flow cytometry confirmed the fact that he was CD20 positive.          A trial of Leukeran was initiated in August of 1999 without response documented in October.  Therapy with Rituxan initiated on 10/14/99 (LYM-5).  Subsequent scans have shown slow, steady response to Rituxan.     Most recent scans were in January 2001 showing further slight interval decrease in size of the lymph nodes.         Scans in July of 2002 showed no evidence of lymphadenopathy.  Repeat scans in July of 2003 are LESLIE.        Surveillance scans in December 2004 and  again in July 2005 showed no evidence of disease.        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  PSA elevation 12/06 requiring probably reevaluation in 2007 by Dr. Jefry Carballo.          Dr. Carballo performed prostate biopsy in March 2007 with no evidence o  f malignancy.       Social History     Socioeconomic History   • Marital status:      Spouse name: Yue   • Number of children: 1   • Years of education: Not on file   • Highest education level: Not on file   Occupational History   • Occupation:      Employer: RETIRED   Tobacco Use   • Smoking status: Never Smoker   • Smokeless tobacco: Never Used   Substance and Sexual Activity   • Alcohol use: Yes     Comment: Occasional beer   • Sexual activity: Defer     Family History   Problem Relation Age of Onset   • Heart failure Mother    • Heart failure Father    • Lymphoma Maternal Aunt    • Heart disease Other    • Hypertension Other    • Cancer Other         non-Hodgkin   • Lymphoma Other         Non-Hodgin lymphoma   No Known Allergies   Current Outpatient Medications on File Prior to Visit   Medication Sig Dispense Refill   • amLODIPine (NORVASC) 2.5 MG tablet Take 2.5 mg by mouth Daily.  5   • aspirin (ASPIRIN ADULT LOW STRENGTH) 81 MG EC tablet Take  by mouth daily.     • cyanocobalamin (VITAMIN B-12) 500 MCG tablet Take  by mouth.     • finasteride (PROSCAR) 5 MG tablet Take 5 mg by mouth daily.     • FLUoxetine (PROzac) 10 MG capsule Take 10 mg by mouth Daily.     • mirtazapine (REMERON) 15 MG tablet Take 15 mg by mouth Daily.     • nebivolol (BYSTOLIC) 5 MG tablet TAKE 1 TABLET BY MOUTH EVERY DAY     • [DISCONTINUED] fluconazole (DIFLUCAN) 200 MG tablet TAKE ONE TABLET BY MOUTH WEEKLY UNTIL NAILS GROW OUT       No current facility-administered medications on file prior to visit.      REVIEW OF SYSTEMS:       General: no fever, no chills, no fatigue,no weight changes, no lack of  appetite.  Eyes: no epiphora, xerophthalmia,conjunctivitis, pain, glaucoma, blurred vision, blindness, secretion, photophobia, proptosis, diplopia.  Ears: no otorrhea, tinnitus, otorrhagia, deafness, pain, vertigo.  Nose: no rhinorrhea, no epistaxis, no alteration in perception of odors, no sinuses pressure.  Mouth: no alteration in gums or teeth,  No ulcers, no difficulty with mastication or deglut ion, no odynophagia.  Neck: no masses or pain, no thyroid alterations, no pain in muscles or arteries, no carotid odynia, no crepitation.  Respiratory: no cough, no sputum production,no dyspnea,no trepopnea, no pleuritic pain,no hemoptysis.  Heart: no syncope, no irregularity, no palpitations, no angina,no orthopnea,no paroxysmal nocturnal dyspnea.  Vascular Venous: no tenderness,no edema,no palpable cords,no postphlebitic syndrome, no skin changes no ulcerations.  Vascular Arterial: no distal ischemia, noclaudication, no gangrene, no neuropathic ischemic pain, no skin ulcers, no paleness no cyanosis.  GI: no dysphagia, no odynophagia, no regurgitation, no heartburn,no indigestion,no nausea,no vomiting,no hematemesis ,no melena,no jaundice,no distention, no obstipation,no enterorrhagia,no proctalgia,no anal  lesions, no changes in bowel habits.  : no frequency, no hesitancy, no hematuria, no discharge,no  pain.  Musculoskeletal: no muscle or tendon pain or inflammation,no  joint pain, no edema, no functional limitation,no fasciculations, no mass.  Neurologic: no headache, no seizures, noalterations on Craneal nerves, no motor deficit, no sensory deficit, normal coordination, no alteration in memory,normal orientation, calculation,normal writting, verbal and written language.  Skin: no rashes,no pruritus no localized lesions.  Psychiatric: no anxiety, no depression,no agitation, no delusions, proper insight.        VITAL SIGNS:  Vitals:    12/18/20 1338   BP: 134/70   Pulse: 65   Resp: 19   Temp: 97.3 °F (36.3 °C)  "  TempSrc: Temporal   SpO2: 98%   Weight: 79.7 kg (175 lb 12.8 oz)   Height: 179 cm (70.47\")          PAIN:  0 out of 10        Exam: I HAVE PERSONALLY REVIEWED THE HISTORY OF THE PRESENT ILLNESS, PAST MEDICAL HISTORY, FAMILY HISTORY, SOCIAL HISTORY, ALLERGIES, MEDICATIONS STATED ABOVE IN THE OFFICE NOTE FROM TODAY.        GENERAL:  Well-developed, well-nourished  Patient  in no acute distress.   SKIN:  Warm, dry ,NO rashes,NO purpura ,NO petechiae.  HEENT:  Pupils were equal and reactive to light and accomodation, conjunctivae noninjected, no pterygium, normal extraocular movements, normal visual acuity.   NECK:  Supple with good range of motion; no thyromegaly or masses, no JVD or bruits, no cervical adenopathies.No carotid artery pain, no carotid abnormal pulsation , NO arterial dance.  LYMPHATICS:  No cervical, NO supraclavicular, NO axillary,NO epitrochlear , NO inguinal adenopathy.  CARDIAC   normal rate and regular rhythm, without murmur,NO rubs NO S3 NO S4 right or left . Normal femoral, popliteal, pedis, brachial and carotid pulses.  VASCULAR ARTERIAL: normal carotids,brachial,radial,femoral,popliteal, pedis pulses , no bruits.no paleness or cyanosis, no pain, no edema, no numbness, no gangrene.  VASCULAR VENOUS: no cyanosis, collateral circulation, varicosities, edema, palpable cords, pain, erythema.  ABDOMEN:  Soft, nontender with no hepatomegaly, no splenomegaly,no masses, no ascites, no collateral circulation,no distention,no Chay sign, no abdominal pain, no inguinal hernias,no umbilical hernia, no abdominal bruits. Normal bowel sounds.  GENITAL: Not  Performed.  EXTREMITIES  AND SPINE:  No clubbing, cyanosis or edema, no deformities or pain .No kyphosis, scoliosis, deformities or pain in spine, ribs or pelvic bone.  NEUROLOGICAL:  Patient was awake, alert, oriented to time, person and place.        LABORATORY DATA  Auto Differential  Order: [id number] - Part of Panel Order [id number]  Status:  Final " result   Visible to patient:  No (not released)   Dx:  Grade 1 follicular lymphoma of lymph ...  Specimen Information: Blood        Component   Ref Range & Units 13:23   WBC   3.40 - 10.80 10*3/mm3 7.19    RBC   4.14 - 5.80 10*6/mm3 4.75    Hemoglobin   13.0 - 17.7 g/dL 14.6    Hematocrit   37.5 - 51.0 % 42.6    MCV   79.0 - 97.0 fL 89.7    MCH   26.6 - 33.0 pg 30.7    MCHC   31.5 - 35.7 g/dL 34.3    RDW   12.3 - 15.4 % 13.2    RDW-SD   37.0 - 54.0 fl 42.8    MPV   6.0 - 12.0 fL 8.2    Platelets   140 - 450 10*3/mm3 161    Neutrophil %   42.7 - 76.0 % 75.7    Lymphocyte %   19.6 - 45.3 % 15.9Low     Monocyte %   5.0 - 12.0 % 5.4    Eosinophil %   0.3 - 6.2 % 1.7    Basophil %   0.0 - 1.5 % 0.6    Immature Grans %   0.0 - 0.5 % 0.7High     Neutrophils, Absolute   1.70 - 7.00 10*3/mm3 5.45                    ASSESSMENT/PLAN:  1. This patient has history of follicular lymphoma that was treated with Rituxan 20 years ago. The patient has been watched close to 20 years after 4 infusions of Rituxan under Lymphoma 5 clinical trial protocol and recently he had a CT scan of his thoracic aortic aneurysm that documented a right axillary lymph node. This was followed up by a biopsy that documents a follicular lymphoma, grade 1 that was BCL-1 negative. This triggered a PET scan that I have reviewed with the patient and wife today. The lymph node visible in the PET scan is not palpable on clinical grounds. There is a minimal uptake in a lymph node in the right hilar area and there is minimal splenomegaly but the spleen has no different SUV activity than the liver has. Other than that, there is no other activity at any level in his body.     His white count, hemoglobin and platelets are normal. His chemistry profile is normal. His white count differential is normal. He has no B symptoms.     In other words, I think this patient has a recurrent follicular lymphoma that was originally treated 20 years ago. On clinical grounds, I do  not feel any palpable lymph nodes at any level and the PET scan talks on its own. I cannot tell if the spleen enlargement is suggestion of lymphoma. Very likely it is the case even though has no notable SUV activity.       Since the previous visit the patient has undergone 2 Rituxan infusions last one given to him last Thursday. On Sunday the patient woke up with joint pain in his hands, elbows, shoulders, knees and impossibility to get out of the bed with local inflammation, low grade fever and he had developed nocturnal sweats. He called the office on Tuesday stating the symptoms, we gave him right away 20 mg of prednisone that he has been taking since then. The symptoms have subsided almost completely still having nocturnal sweats last night.     The patient and wife recall an event before his diagnosis of lymphoma more than 20 years ago that was similar, the patient was admitted to the hospital, had extensive assessment, IV steroids were given, resolved in a question of 3 days never having any other episode again.    My belief is that this patient probably has developed serum sickness associated with the Rituxan infusion. Also in Rituxan literature there is suggestion of polyangiitis and polyarthritis associated with conditions with Rituxan administration. I have given Rituxan to patients for more than 20 years, I never have seen neither any of my partners seen any situation like this before. Therefore now the patient is better with prednisone but obviously the question is how to proceed.         The patient was further reviewed on 08/11/2020. At this time the patient is completely asymptomatic, fully functional, doing anything that he pleases and taking all of the precautions to minimize coronavirus infection. He tells me that his son-in-law and his granddaughter were tested positive for the virus. His daughter was tested negative. They live in [place]. They have not been in physical contact with them.      From the point of view of the CT scans of the chest, abdomen and pelvis to my eyes I feel very minimal if any adenopathy in the right axilla. The so-called mediastinal lymph nodes are resolved. There is minimal splenomegaly that is unchanged and has been present for a long time and otherwise no other alterations. His white count, hemoglobin, platelets, white count differential, chemistry profile and LDH remain normal. The PERIPHERAL BLOOD flow cytometry in 06/2020 failed to document a monoclonal protein population of cells in peripheral circulation.       The patient was reviewed on 12/18/2020. On clinical examination I find nothing to suggest lymphoma recurrence in neck, axillas, groins. There is no liver enlargement, no splenomegaly, there are no B symptoms, normal white count, hemoglobin and platelets and white count differential. Chemistry profile is pending.    I do believe that the lymphoma in this patient is quiescent. I find no need for any other intervention on him neither radiological assessment unless that the clinical circumstances change. I will review him back in 6 months with a CBC and CMP. I find no need for radiological assessment then again unless that the clinical circumstances change.     His son-in-law and his granddaughter have had COVID infection. His daughter never got COVID even though she tested 3 different times living in the same house and with no protection. Peculiarities about this infection remains there. Obviously the patient never has been close to his daughter, granddaughter and son-in-law since the first of the year.         I DISCUSSED WITH PATIENT IN DETAIL FORMS TO DECREASE CHANCES OF CORONAVIRUS INFECTION INCLUDING ISOLATION, PROPER HAND HIGIENE, AVOID PUBLIC PLACES  WITH CROWDS, FOLLOW  CDC RECOMENDATIONS, AND KEEP PERSONAL AND SOCIAL RESPONSIBILITY, WARE A MASK IN PUBLIC PLACES.  PATIENT IS AWARE THIS INFECTION COULD HAVE SEVERE CONSEQUENCES TO PERSONAL HEALTH AND FAMILY  RAMIFICATIONS OF THIS.

## 2021-03-09 DIAGNOSIS — Z23 IMMUNIZATION DUE: ICD-10-CM

## 2021-06-18 ENCOUNTER — TELEPHONE (OUTPATIENT)
Dept: ONCOLOGY | Facility: CLINIC | Age: 76
End: 2021-06-18

## 2021-06-18 NOTE — TELEPHONE ENCOUNTER
Caller: Fox Richardson    Relationship: Self    Best call back number: [phone]    What is the best time to reach you: ANYTIME    What was the call regarding: FOX CALLED BECAUSE HIS LAB AND HIS FOLLOW UP ARE SCHEDULED FOR TWO DIFFERENT DAYS. HE NEEDS THOSE TO BOTH BE ON 06/28. HE NEEDS THE LAB AT 10:30. PLEASE CALL BACK ONCE FIXED. HUB UNABLE TO SCHEDULE WITHIN TIMEFRAME.    Do you require a callback: YES

## 2021-06-25 ENCOUNTER — APPOINTMENT (OUTPATIENT)
Dept: LAB | Facility: HOSPITAL | Age: 76
End: 2021-06-25

## 2021-06-28 ENCOUNTER — LAB (OUTPATIENT)
Dept: LAB | Facility: HOSPITAL | Age: 76
End: 2021-06-28

## 2021-06-28 ENCOUNTER — OFFICE VISIT (OUTPATIENT)
Dept: ONCOLOGY | Facility: CLINIC | Age: 76
End: 2021-06-28

## 2021-06-28 VITALS
TEMPERATURE: 97.3 F | HEIGHT: 70 IN | DIASTOLIC BLOOD PRESSURE: 76 MMHG | WEIGHT: 174.7 LBS | RESPIRATION RATE: 14 BRPM | HEART RATE: 67 BPM | OXYGEN SATURATION: 98 % | SYSTOLIC BLOOD PRESSURE: 137 MMHG | BODY MASS INDEX: 25.01 KG/M2

## 2021-06-28 DIAGNOSIS — C82.08 GRADE 1 FOLLICULAR LYMPHOMA OF LYMPH NODES OF MULTIPLE REGIONS (HCC): Primary | ICD-10-CM

## 2021-06-28 DIAGNOSIS — C82.08 GRADE 1 FOLLICULAR LYMPHOMA OF LYMPH NODES OF MULTIPLE REGIONS (HCC): ICD-10-CM

## 2021-06-28 LAB
ALBUMIN SERPL-MCNC: 4.3 G/DL (ref 3.5–5.2)
ALBUMIN/GLOB SERPL: 1.6 G/DL (ref 1.1–2.4)
ALP SERPL-CCNC: 68 U/L (ref 38–116)
ALT SERPL W P-5'-P-CCNC: 11 U/L (ref 0–41)
ANION GAP SERPL CALCULATED.3IONS-SCNC: 10 MMOL/L (ref 5–15)
AST SERPL-CCNC: 14 U/L (ref 0–40)
BASOPHILS # BLD AUTO: 0.03 10*3/MM3 (ref 0–0.2)
BASOPHILS NFR BLD AUTO: 0.6 % (ref 0–1.5)
BILIRUB SERPL-MCNC: 0.5 MG/DL (ref 0.2–1.2)
BUN SERPL-MCNC: 16 MG/DL (ref 6–20)
BUN/CREAT SERPL: 18 (ref 7.3–30)
CALCIUM SPEC-SCNC: 9.3 MG/DL (ref 8.5–10.2)
CHLORIDE SERPL-SCNC: 102 MMOL/L (ref 98–107)
CO2 SERPL-SCNC: 26 MMOL/L (ref 22–29)
CREAT SERPL-MCNC: 0.89 MG/DL (ref 0.7–1.3)
DEPRECATED RDW RBC AUTO: 44.3 FL (ref 37–54)
EOSINOPHIL # BLD AUTO: 0.13 10*3/MM3 (ref 0–0.4)
EOSINOPHIL NFR BLD AUTO: 2.5 % (ref 0.3–6.2)
ERYTHROCYTE [DISTWIDTH] IN BLOOD BY AUTOMATED COUNT: 13.6 % (ref 12.3–15.4)
GFR SERPL CREATININE-BSD FRML MDRD: 84 ML/MIN/1.73
GLOBULIN UR ELPH-MCNC: 2.7 GM/DL (ref 1.8–3.5)
GLUCOSE SERPL-MCNC: 124 MG/DL (ref 74–124)
HCT VFR BLD AUTO: 43.1 % (ref 37.5–51)
HGB BLD-MCNC: 14.7 G/DL (ref 13–17.7)
IMM GRANULOCYTES # BLD AUTO: 0.03 10*3/MM3 (ref 0–0.05)
IMM GRANULOCYTES NFR BLD AUTO: 0.6 % (ref 0–0.5)
LYMPHOCYTES # BLD AUTO: 1.06 10*3/MM3 (ref 0.7–3.1)
LYMPHOCYTES NFR BLD AUTO: 20.7 % (ref 19.6–45.3)
MCH RBC QN AUTO: 30.6 PG (ref 26.6–33)
MCHC RBC AUTO-ENTMCNC: 34.1 G/DL (ref 31.5–35.7)
MCV RBC AUTO: 89.8 FL (ref 79–97)
MONOCYTES # BLD AUTO: 0.28 10*3/MM3 (ref 0.1–0.9)
MONOCYTES NFR BLD AUTO: 5.5 % (ref 5–12)
NEUTROPHILS NFR BLD AUTO: 3.6 10*3/MM3 (ref 1.7–7)
NEUTROPHILS NFR BLD AUTO: 70.1 % (ref 42.7–76)
NRBC BLD AUTO-RTO: 0 /100 WBC (ref 0–0.2)
PLATELET # BLD AUTO: 199 10*3/MM3 (ref 140–450)
PMV BLD AUTO: 8.7 FL (ref 6–12)
POTASSIUM SERPL-SCNC: 4.4 MMOL/L (ref 3.5–4.7)
PROT SERPL-MCNC: 7 G/DL (ref 6.3–8)
RBC # BLD AUTO: 4.8 10*6/MM3 (ref 4.14–5.8)
SODIUM SERPL-SCNC: 138 MMOL/L (ref 134–145)
WBC # BLD AUTO: 5.13 10*3/MM3 (ref 3.4–10.8)

## 2021-06-28 PROCEDURE — 80053 COMPREHEN METABOLIC PANEL: CPT

## 2021-06-28 PROCEDURE — 99213 OFFICE O/P EST LOW 20 MIN: CPT | Performed by: INTERNAL MEDICINE

## 2021-06-28 PROCEDURE — 85025 COMPLETE CBC W/AUTO DIFF WBC: CPT | Performed by: INTERNAL MEDICINE

## 2021-06-28 PROCEDURE — 36415 COLL VENOUS BLD VENIPUNCTURE: CPT

## 2021-06-28 NOTE — PROGRESS NOTES
REASONS FOR FOLLOWUP:        Remote history of non-Hodgkin lymphoma, in remission for more than 18 years. He received Rituxan at the time of his original diagnosis.     HISTORY OF PRESENT ILLNESS          DURING THE VISIT WITH THE PATIENT TODAY , PATIENT HAD FACE MASK, MY MEDICAL ASSISTANT AND I  HAD PROPPER PROTECTIVE EQUIPMENT, AND I DID HAND HYGIENE WITH SOAP AND WATER BEFORE AND AFTER THE VISIT.    This patient returns today to the office for followup. He is here today very sad because his daughter is going to move from [place] to [place] and he is not going to be able to witness the growth of his granddaughter like he has been doing now days. In any event, physically he feels well. He is not having any symptoms pertinent to his previous history of lymphoma including no fever, chills, sweats, pruritus or adenopathies. Appetite and weight remain stable. Bowel function and urination remain normal. No new cardiovascular or respiratory issues. In regard to his depression, he is much better. He is very stable and he continues taking his medicines for this purpose including Prozac. The patient remains functional, playing golf and doing a lot of things around his condo.                    Past Medical History:   Diagnosis Date   • Anxiety    • Aortic insufficiency     Mild   • Atrial fibrillation (CMS/HCC)    • Depression    • Heart murmur    • Hypertension    • Lymphoma, non-Hodgkin's (CMS/HCC)    • Nonrheumatic aortic (valve) insufficiency    • Nonrheumatic aortic (valve) stenosis    • PAF (paroxysmal atrial fibrillation) (CMS/HCC)    • Polyarthralgia 06/1997   • Sleep apnea    • Stroke (CMS/HCC)     eye     Past Surgical History:   Procedure Laterality Date   • AXILLARY LYMPH NODE BIOPSY/EXCISION Left 12/1996   • CATARACT EXTRACTION     • HEMORRHOIDECTOMY  1976, 1977   • PROSTATE BIOPSY  03/2007         PAST ONCOLOGIC HISTORY:  The patient has had the common childhood illnesses and  hemorrhoidectomy in 1976 and 1977.  In December of 1996 he had left axillary lymphadenopathy completely excise (4 cm).   Pathologically this was nodular, well differentiated, lymphocytic lymphoma ([id number]) [facility], flow cytometry showed a monoclonal population, CD20 and CD 10 positive, CD 5 negative, consistent with follicular center cell lymphoma.     This included a negative bone marrow. Chest x-ray in 1998 showed a granuloma in the left upper lobe with calcified nodes in the left hilum.  He is PPD negative by history.          In June of 1997, he was admitted to [facility] by Dr. Thomason and seen in consultation by Dr. Tiwari with acute polyarthralgia and he was briefly treated with steroids.          In August of 1999, he had evidence of recurrent disease with mediastinal, axillary and mesenteric lymphadenopath  y.  Right axillary lymph node biopsy by Dr. Fernandez ([facility] #[id number]) demonstrated PDL nodular lymphoma. The flow cytometry confirmed the fact that he was CD20 positive.          A trial of Leukeran was initiated in August of 1999 without response documented in October.  Therapy with Rituxan initiated on 10/14/99 (LYM-5).  Subsequent scans have shown slow, steady response to Rituxan.     Most recent scans were in January 2001 showing further slight interval decrease in size of the lymph nodes.         Scans in July of 2002 showed no evidence of lymphadenopathy.  Repeat scans in July of 2003 are LESLIE.        Surveillance scans in December 2004 and again in July 2005 showed no evidence of disease.        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  PSA elevation 12/06 requiring probably reevaluation in 2007 by Dr. Jefry Carballo.          Dr. Carballo performed prostate biopsy in March 2007 with no evidence o  f malignancy.       Social History     Socioeconomic History   • Marital status:      Spouse name: Yue   • Number of  "children: 1   • Years of education: Not on file   • Highest education level: Not on file   Tobacco Use   • Smoking status: Never Smoker   • Smokeless tobacco: Never Used   Substance and Sexual Activity   • Alcohol use: Yes     Comment: Occasional beer   • Sexual activity: Defer     Family History   Problem Relation Age of Onset   • Heart failure Mother    • Heart failure Father    • Lymphoma Maternal Aunt    • Heart disease Other    • Hypertension Other    • Cancer Other         non-Hodgkin   • Lymphoma Other         Non-Hodgin lymphoma   No Known Allergies   Current Outpatient Medications on File Prior to Visit   Medication Sig Dispense Refill   • amLODIPine (NORVASC) 2.5 MG tablet Take 2.5 mg by mouth Daily.  5   • aspirin (ASPIRIN ADULT LOW STRENGTH) 81 MG EC tablet Take  by mouth daily.     • cyanocobalamin (VITAMIN B-12) 500 MCG tablet Take  by mouth.     • finasteride (PROSCAR) 5 MG tablet Take 5 mg by mouth daily.     • FLUoxetine (PROzac) 10 MG capsule Take 10 mg by mouth Daily.     • mirtazapine (REMERON) 15 MG tablet Take 15 mg by mouth Daily.     • nebivolol (BYSTOLIC) 5 MG tablet TAKE 1 TABLET BY MOUTH EVERY DAY       No current facility-administered medications on file prior to visit.           VITAL SIGNS:  Vitals:    06/28/21 1113   BP: 137/76   Pulse: 67   Resp: 14   Temp: 97.3 °F (36.3 °C)   TempSrc: Infrared   SpO2: 98%   Weight: 79.2 kg (174 lb 11.2 oz)   Height: 179 cm (70.47\")          PAIN:  0 out of 10        Exam:     I HAVE PERSONALLY REVIEWED THE HISTORY OF THE PRESENT ILLNESS, PAST MEDICAL HISTORY, FAMILY HISTORY, SOCIAL HISTORY, ALLERGIES, MEDICATIONS STATED ABOVE IN THE OFFICE NOTE FROM TODAY.        GENERAL:  Well-developed, well-nourished  Patient  in no acute distress.   SKIN:  Warm, dry ,NO rashes,NO purpura ,NO petechiae.  HEENT:  Pupils were equal and reactive to light and accomodation, conjunctivae noninjected, no pterygium, normal extraocular movements, normal visual acuity. "   NECK:  Supple with good range of motion; no thyromegaly or masses, no JVD or bruits, no cervical adenopathies.No carotid artery pain, no carotid abnormal pulsation , NO arterial dance.  LYMPHATICS:  No cervical, NO supraclavicular, NO axillary,NO epitrochlear , NO inguinal adenopathy.  CARDIAC   normal rate and regular rhythm, without murmur,NO rubs NO S3 NO S4 right or left . VASCULAR VENOUS: no cyanosis, collateral circulation, varicosities, edema, palpable cords, pain, erythema.  ABDOMEN:  Soft, nontender with no hepatomegaly, no splenomegaly,no masses, no ascites, no collateral circulation,no distention,no Chay sign, no abdominal pain, no inguinal hernias,no umbilical hernia, no abdominal bruits. Normal bowel sounds.  GENITAL: Not  Performed.  EXTREMITIES  AND SPINE:  No clubbing, cyanosis or edema, no deformities or pain .No kyphosis, scoliosis, deformities or pain in spine, ribs or pelvic bone.  NEUROLOGICAL:  Patient was awake, alert, oriented to time, person and place.        LABORATORY DATA  CBC Auto Differential  Order: [id number] - Part of Panel Order [id number]  Status:  Final result   Visible to patient:  No (scheduled for 6/28/2021 11:50 AM)   Dx:  Grade 1 follicular lymphoma of lymph ...  Specimen Information: Blood        Component   Ref Range & Units 10:46   WBC   3.40 - 10.80 10*3/mm3 5.13    RBC   4.14 - 5.80 10*6/mm3 4.80    Hemoglobin   13.0 - 17.7 g/dL 14.7    Hematocrit   37.5 - 51.0 % 43.1    MCV   79.0 - 97.0 fL 89.8    MCH   26.6 - 33.0 pg 30.6    MCHC   31.5 - 35.7 g/dL 34.1    RDW   12.3 - 15.4 % 13.6    RDW-SD   37.0 - 54.0 fl 44.3    MPV   6.0 - 12.0 fL 8.7    Platelets   140 - 450 10*3/mm3 199    Neutrophil %   42.7 - 76.0 % 70.1    Lymphocyte %   19.6 - 45.3 % 20.7    Monocyte %   5.0 - 12.0 % 5.5    Eosinophil %   0.3 - 6.2 % 2.5    Basophil %   0.0 - 1.5 % 0.6    Immature Grans %   0.0 - 0.5 % 0.6High                          ASSESSMENT/PLAN:  1. This patient has history of  follicular lymphoma that was treated with Rituxan 20 years ago. The patient has been watched close to 20 years after 4 infusions of Rituxan under Lymphoma 5 clinical trial protocol and recently he had a CT scan of his thoracic aortic aneurysm that documented a right axillary lymph node. This was followed up by a biopsy that documents a follicular lymphoma, grade 1 that was BCL-1 negative. This triggered a PET scan that I have reviewed with the patient and wife today. The lymph node visible in the PET scan is not palpable on clinical grounds. There is a minimal uptake in a lymph node in the right hilar area and there is minimal splenomegaly but the spleen has no different SUV activity than the liver has. Other than that, there is no other activity at any level in his body.     His white count, hemoglobin and platelets are normal. His chemistry profile is normal. His white count differential is normal. He has no B symptoms.     In other words, I think this patient has a recurrent follicular lymphoma that was originally treated 20 years ago. On clinical grounds, I do not feel any palpable lymph nodes at any level and the PET scan talks on its own. I cannot tell if the spleen enlargement is suggestion of lymphoma. Very likely it is the case even though has no notable SUV activity.       Since the previous visit the patient has undergone 2 Rituxan infusions last one given to him last Thursday. On Sunday the patient woke up with joint pain in his hands, elbows, shoulders, knees and impossibility to get out of the bed with local inflammation, low grade fever and he had developed nocturnal sweats. He called the office on Tuesday stating the symptoms, we gave him right away 20 mg of prednisone that he has been taking since then. The symptoms have subsided almost completely still having nocturnal sweats last night.     The patient and wife recall an event before his diagnosis of lymphoma more than 20 years ago that was  similar, the patient was admitted to the hospital, had extensive assessment, IV steroids were given, resolved in a question of 3 days never having any other episode again.    My belief is that this patient probably has developed serum sickness associated with the Rituxan infusion. Also in Rituxan literature there is suggestion of polyangiitis and polyarthritis associated with conditions with Rituxan administration. I have given Rituxan to patients for more than 20 years, I never have seen neither any of my partners seen any situation like this before. Therefore now the patient is better with prednisone but obviously the question is how to proceed.         The patient was further reviewed on 08/11/2020. At this time the patient is completely asymptomatic, fully functional, doing anything that he pleases and taking all of the precautions to minimize coronavirus infection. He tells me that his son-in-law and his granddaughter were tested positive for the virus. His daughter was tested negative. They live in [place]. They have not been in physical contact with them.     From the point of view of the CT scans of the chest, abdomen and pelvis to my eyes I feel very minimal if any adenopathy in the right axilla. The so-called mediastinal lymph nodes are resolved. There is minimal splenomegaly that is unchanged and has been present for a long time and otherwise no other alterations. His white count, hemoglobin, platelets, white count differential, chemistry profile and LDH remain normal. The PERIPHERAL BLOOD flow cytometry in 06/2020 failed to document a monoclonal protein population of cells in peripheral circulation.       The patient was reviewed on 12/18/2020. On clinical examination I find nothing to suggest lymphoma recurrence in neck, axillas, groins. There is no liver enlargement, no splenomegaly, there are no B symptoms, normal white count, hemoglobin and platelets and white count differential. Chemistry  profile is pending.    I do believe that the lymphoma in this patient is quiescent. I find no need for any other intervention on him neither radiological assessment unless that the clinical circumstances change. I will review him back in 6 months with a CBC and CMP. I find no need for radiological assessment then again unless that the clinical circumstances change.       Upon reviewing the patient on 06/28/2021, I find no symptoms or signs of lymphoma. Meticulous examination was done of the right axilla finding no abnormalities whatsoever. He has no peripheral adenopathy. His white count, hemoglobin and platelets are normal. His chemistry profile is completely normal.     I do believe that the patient’s lymphoma is very quiet. I do not believe that he needs to pursue any other radiological analysis from my point of view. His cardiothoracic surgeon will follow up on his thoracic aneurysm at [facility] in the future and we can utilize that to follow up in regard to his right axillary adenopathy that is visible radiologically but not palpable clinically.     I asked him to come back and see us in a year.     The patient has completed his COVID vaccinations along with his wife.

## 2022-06-15 ENCOUNTER — OFFICE VISIT (OUTPATIENT)
Dept: ONCOLOGY | Facility: CLINIC | Age: 77
End: 2022-06-15

## 2022-06-15 ENCOUNTER — LAB (OUTPATIENT)
Dept: LAB | Facility: HOSPITAL | Age: 77
End: 2022-06-15

## 2022-06-15 VITALS
WEIGHT: 176 LBS | OXYGEN SATURATION: 96 % | SYSTOLIC BLOOD PRESSURE: 133 MMHG | HEIGHT: 70 IN | DIASTOLIC BLOOD PRESSURE: 70 MMHG | RESPIRATION RATE: 16 BRPM | HEART RATE: 77 BPM | BODY MASS INDEX: 25.2 KG/M2 | TEMPERATURE: 97.1 F

## 2022-06-15 DIAGNOSIS — C82.08 GRADE 1 FOLLICULAR LYMPHOMA OF LYMPH NODES OF MULTIPLE REGIONS: Primary | ICD-10-CM

## 2022-06-15 DIAGNOSIS — C82.08 GRADE 1 FOLLICULAR LYMPHOMA OF LYMPH NODES OF MULTIPLE REGIONS: ICD-10-CM

## 2022-06-15 LAB
ALBUMIN SERPL-MCNC: 4.6 G/DL (ref 3.5–5.2)
ALBUMIN/GLOB SERPL: 1.7 G/DL
ALP SERPL-CCNC: 69 U/L (ref 39–117)
ALT SERPL W P-5'-P-CCNC: 11 U/L (ref 1–41)
ANION GAP SERPL CALCULATED.3IONS-SCNC: 10 MMOL/L (ref 5–15)
AST SERPL-CCNC: 13 U/L (ref 1–40)
BASOPHILS # BLD AUTO: 0.04 10*3/MM3 (ref 0–0.2)
BASOPHILS NFR BLD AUTO: 0.6 % (ref 0–1.5)
BILIRUB SERPL-MCNC: 0.5 MG/DL (ref 0–1.2)
BUN SERPL-MCNC: 12 MG/DL (ref 8–23)
BUN/CREAT SERPL: 15 (ref 7–25)
CALCIUM SPEC-SCNC: 9.5 MG/DL (ref 8.6–10.5)
CHLORIDE SERPL-SCNC: 103 MMOL/L (ref 98–107)
CO2 SERPL-SCNC: 26 MMOL/L (ref 22–29)
CREAT SERPL-MCNC: 0.8 MG/DL (ref 0.76–1.27)
DEPRECATED RDW RBC AUTO: 43 FL (ref 37–54)
EGFRCR SERPLBLD CKD-EPI 2021: 92.9 ML/MIN/1.73
EOSINOPHIL # BLD AUTO: 0.17 10*3/MM3 (ref 0–0.4)
EOSINOPHIL NFR BLD AUTO: 2.4 % (ref 0.3–6.2)
ERYTHROCYTE [DISTWIDTH] IN BLOOD BY AUTOMATED COUNT: 13.2 % (ref 12.3–15.4)
GLOBULIN UR ELPH-MCNC: 2.7 GM/DL
GLUCOSE SERPL-MCNC: 89 MG/DL (ref 65–99)
HCT VFR BLD AUTO: 43.4 % (ref 37.5–51)
HGB BLD-MCNC: 14.7 G/DL (ref 13–17.7)
IMM GRANULOCYTES # BLD AUTO: 0.05 10*3/MM3 (ref 0–0.05)
IMM GRANULOCYTES NFR BLD AUTO: 0.7 % (ref 0–0.5)
LYMPHOCYTES # BLD AUTO: 1.37 10*3/MM3 (ref 0.7–3.1)
LYMPHOCYTES NFR BLD AUTO: 19.4 % (ref 19.6–45.3)
MCH RBC QN AUTO: 30.5 PG (ref 26.6–33)
MCHC RBC AUTO-ENTMCNC: 33.9 G/DL (ref 31.5–35.7)
MCV RBC AUTO: 90 FL (ref 79–97)
MONOCYTES # BLD AUTO: 0.43 10*3/MM3 (ref 0.1–0.9)
MONOCYTES NFR BLD AUTO: 6.1 % (ref 5–12)
NEUTROPHILS NFR BLD AUTO: 5.01 10*3/MM3 (ref 1.7–7)
NEUTROPHILS NFR BLD AUTO: 70.8 % (ref 42.7–76)
NRBC BLD AUTO-RTO: 0 /100 WBC (ref 0–0.2)
PLATELET # BLD AUTO: 179 10*3/MM3 (ref 140–450)
PMV BLD AUTO: 8.7 FL (ref 6–12)
POTASSIUM SERPL-SCNC: 4.8 MMOL/L (ref 3.5–5.2)
PROT SERPL-MCNC: 7.3 G/DL (ref 6–8.5)
RBC # BLD AUTO: 4.82 10*6/MM3 (ref 4.14–5.8)
SODIUM SERPL-SCNC: 139 MMOL/L (ref 136–145)
WBC NRBC COR # BLD: 7.07 10*3/MM3 (ref 3.4–10.8)

## 2022-06-15 PROCEDURE — 80053 COMPREHEN METABOLIC PANEL: CPT | Performed by: INTERNAL MEDICINE

## 2022-06-15 PROCEDURE — 85025 COMPLETE CBC W/AUTO DIFF WBC: CPT

## 2022-06-15 PROCEDURE — 99213 OFFICE O/P EST LOW 20 MIN: CPT | Performed by: INTERNAL MEDICINE

## 2022-06-15 PROCEDURE — 36415 COLL VENOUS BLD VENIPUNCTURE: CPT

## 2022-06-15 RX ORDER — AMOXICILLIN 500 MG/1
CAPSULE ORAL
COMMUNITY
Start: 2022-03-18 | End: 2022-06-16

## 2022-06-15 RX ORDER — FLUCONAZOLE 200 MG/1
TABLET ORAL
COMMUNITY
Start: 2022-06-01

## 2022-06-15 NOTE — PROGRESS NOTES
REASONS FOR FOLLOWUP:      1.  Remote history of non-Hodgkin lymphoma, LOW GRADE, AXILLARY AND MEDIASTINAL ADENOPATHIES, in remission for more than 20 years. He received Rituxan at the time of his original diagnosis.     HISTORY OF PRESENT ILLNESS    DURING THE VISIT WITH THE PATIENT TODAY , PATIENT HAD FACE MASK, MY MEDICAL ASSISTANT AND I  HAD PROPPER PROTECTIVE EQUIPMENT, AND I DID HAND HYGIENE WITH SOAP AND WATER BEFORE AND AFTER THE VISIT.    On 06/15/2022 shows a 76-year-old white male who has history of a low grade follicular lymphoma that was treated 25 years ago with Rituxan, had a recurrence 2 years ago was treated with Rituxan and developed a severe reaction including arthritis. The patient has not had any further issues since then and Rituxan during the relapse was discontinued. The patient today only has symptoms of occasional nocturnal sweats in his chest. He has no fever, no chills, no peripheral adenopathy, no pain. Appetite and weight are stable. Bowel function and urination remain normal. No rashes in the skin, no joint pain. No further cardiovascular issues. He is going to have a CT scan of the chest to follow up on his thoracic aneurysm by [facility] in the last week in 07/2022. We will utilize this to follow up on his lymphoma. Besides this he is active, he is not playing golf anymore. He remains enjoying his granddaughter who lives now in [place]. His wife is in good health and they both share a lot of time together.                     Past Medical History:   Diagnosis Date   • Anxiety    • Aortic insufficiency     Mild   • Atrial fibrillation ([facility])    • Depression    • Heart murmur    • Hypertension    • Lymphoma, non-Hodgkin's (HCC)    • Nonrheumatic aortic (valve) insufficiency    • Nonrheumatic aortic (valve) stenosis    • PAF (paroxysmal atrial fibrillation) ([facility])    • Polyarthralgia 06/1997   • Sleep apnea    • Stroke ([facility])     eye     Past Surgical History:    Procedure Laterality Date   • AXILLARY LYMPH NODE BIOPSY/EXCISION Left 12/1996   • CATARACT EXTRACTION     • HEMORRHOIDECTOMY  1976, 1977   • PROSTATE BIOPSY  03/2007         PAST ONCOLOGIC HISTORY:  The patient has had the common childhood illnesses and hemorrhoidectomy in 1976 and 1977.  In December of 1996 he had left axillary lymphadenopathy completely excise (4 cm).   Pathologically this was nodular, well differentiated, lymphocytic lymphoma ([id number]) [facility], flow cytometry showed a monoclonal population, CD20 and CD 10 positive, CD 5 negative, consistent with follicular center cell lymphoma.     This included a negative bone marrow. Chest x-ray in 1998 showed a granuloma in the left upper lobe with calcified nodes in the left hilum.  He is PPD negative by history.          In June of 1997, he was admitted to [facility] by Dr. Thomason and seen in consultation by Dr. Tiwari with acute polyarthralgia and he was briefly treated with steroids.          In August of 1999, he had evidence of recurrent disease with mediastinal, axillary and mesenteric lymphadenopath  y.  Right axillary lymph node biopsy by Dr. Fernandez ([facility] #[id number]) demonstrated PDL nodular lymphoma. The flow cytometry confirmed the fact that he was CD20 positive.          A trial of Leukeran was initiated in August of 1999 without response documented in October.  Therapy with Rituxan initiated on 10/14/99 (LYM-5).  Subsequent scans have shown slow, steady response to Rituxan.     Most recent scans were in January 2001 showing further slight interval decrease in size of the lymph nodes.         Scans in July of 2002 showed no evidence of lymphadenopathy.  Repeat scans in July of 2003 are LESLIE.        Surveillance scans in December 2004 and again in July 2005 showed no evidence of disease.        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  PSA elevation 12/06 requiring probably  "reevaluation in 2007 by Dr. Jefry Carballo.          Dr. Carballo performed prostate biopsy in March 2007 with no evidence o  f malignancy.       Social History     Socioeconomic History   • Marital status:      Spouse name: Yue   • Number of children: 1   Tobacco Use   • Smoking status: Never Smoker   • Smokeless tobacco: Never Used   Substance and Sexual Activity   • Alcohol use: Yes     Comment: Occasional beer   • Sexual activity: Defer     Family History   Problem Relation Age of Onset   • Heart failure Mother    • Heart failure Father    • Lymphoma Maternal Aunt    • Heart disease Other    • Hypertension Other    • Cancer Other         non-Hodgkin   • Lymphoma Other         Non-Hodgin lymphoma   No Known Allergies   Current Outpatient Medications on File Prior to Visit   Medication Sig Dispense Refill   • amLODIPine (NORVASC) 2.5 MG tablet Take 2.5 mg by mouth Daily.  5   • aspirin (aspirin) 81 MG EC tablet Take  by mouth daily.     • cyanocobalamin (VITAMIN B-12) 500 MCG tablet Take  by mouth.     • finasteride (PROSCAR) 5 MG tablet Take 5 mg by mouth daily.     • fluconazole (DIFLUCAN) 200 MG tablet TAKE ONE TABLET ONCE A MONTH     • FLUoxetine (PROzac) 10 MG capsule Take 10 mg by mouth Daily.     • mirtazapine (REMERON) 15 MG tablet Take 15 mg by mouth Daily.     • nebivolol (BYSTOLIC) 5 MG tablet TAKE 1 TABLET BY MOUTH EVERY DAY     • amoxicillin (AMOXIL) 500 MG capsule        No current facility-administered medications on file prior to visit.           VITAL SIGNS:  Vitals:    06/15/22 1352   BP: 133/70   Pulse: 77   Resp: 16   Temp: 97.1 °F (36.2 °C)   TempSrc: Temporal   SpO2: 96%   Weight: 79.8 kg (176 lb)   Height: 179 cm (70.47\")          PAIN:  0 out of 10        Exam:       GENERAL:  Well-developed, well-nourished  Patient  in no acute distress.   SKIN:  Warm, dry ,NO purpura ,no rash.  HEENT:  Pupils were equal and reactive to light and accomodation, conjunctivae noninjected, normal " extraocular movements, normal visual acuity.   NECK:  Supple with good range of motion; no thyromegaly , no JVD or bruits,.No carotid artery pain, no carotid abnormal pulsation   LYMPHATICS:  No cervical, NO supraclavicular, NO axillary, NO inguinal adenopathies.  CARDIAC   normal rate , regular rhythm, with SYSTOLIC GRADE 2/6 BASAL AORTIC murmur,NO rubs NO S3 NO S4   LUNGS: normal breath sounds bilateral, no wheezing, NO rhonchi, NO crackles ,NO rubs.  VASCULAR VENOUS: no cyanosis, NO collateral circulation, NO varicosities, NO edema, NO palpable cords, NO pain,NO erythema, NO pigmentation of the skin.  ABDOMEN:  Soft, NO pain,no hepatomegaly, no splenomegaly,no masses, no ascites, no collateral circulation,no distention.SAMLL 1 CM UMBILICAL HERNIA  EXTREMITIES  AND SPINE:  No clubbing, no cyanosis ,no deformities , no pain .No kyphosis,  no pain in spine, no pain in ribs , no pain in pelvic bone.  NEUROLOGICAL:  Patient was awake, alert, oriented to time, person and place.          LABORATORY DATA    Lab Results   Component Value Date    WBC 7.07 06/15/2022    HGB 14.7 06/15/2022    HCT 43.4 06/15/2022    MCV 90.0 06/15/2022     06/15/2022         ASSESSMENT/PLAN:  1. This patient has history of follicular lymphoma that was treated with Rituxan 25 years ago. The patient has been watched close to 20 years after 4 infusions of Rituxan under Lymphoma 5 clinical trial protocol and recently he had a CT scan of his thoracic aortic aneurysm that documented a right axillary lymph node. This was followed up by a biopsy that documents a follicular lymphoma, grade 1 that was BCL-1 negative. This triggered a PET scan that I have reviewed with the patient and wife today. The lymph node visible in the PET scan is not palpable on clinical grounds. There is a minimal uptake in a lymph node in the right hilar area and there is minimal splenomegaly but the spleen has no different SUV activity than the liver has. Other than  that, there is no other activity at any level in his body.     His white count, hemoglobin and platelets are normal. His chemistry profile is normal. His white count differential is normal. He has no B symptoms.     In other words, I think this patient has a recurrent follicular lymphoma that was originally treated 20 years ago. On clinical grounds, I do not feel any palpable lymph nodes at any level and the PET scan talks on its own. I cannot tell if the spleen enlargement is suggestion of lymphoma. Very likely it is the case even though has no notable SUV activity.       Since the previous visit the patient has undergone 2 Rituxan infusions last one given to him last Thursday. On Sunday the patient woke up with joint pain in his hands, elbows, shoulders, knees and impossibility to get out of the bed with local inflammation, low grade fever and he had developed nocturnal sweats. He called the office on Tuesday stating the symptoms, we gave him right away 20 mg of prednisone that he has been taking since then. The symptoms have subsided almost completely still having nocturnal sweats last night.     The patient and wife recall an event before his diagnosis of lymphoma more than 20 years ago that was similar, the patient was admitted to the hospital, had extensive assessment, IV steroids were given, resolved in a question of 3 days never having any other episode again.    My belief is that this patient probably has developed serum sickness associated with the Rituxan infusion. Also in Rituxan literature there is suggestion of polyangiitis and polyarthritis associated with conditions with Rituxan administration. I have given Rituxan to patients for more than 20 years, I never have seen neither any of my partners seen any situation like this before. Therefore now the patient is better with prednisone but obviously the question is how to proceed.         The patient was further reviewed on 08/11/2020. At this time the  patient is completely asymptomatic, fully functional, doing anything that he pleases and taking all of the precautions to minimize coronavirus infection. He tells me that his son-in-law and his granddaughter were tested positive for the virus. His daughter was tested negative. They live in [place]. They have not been in physical contact with them.     From the point of view of the CT scans of the chest, abdomen and pelvis to my eyes I feel very minimal if any adenopathy in the right axilla. The so-called mediastinal lymph nodes are resolved. There is minimal splenomegaly that is unchanged and has been present for a long time and otherwise no other alterations. His white count, hemoglobin, platelets, white count differential, chemistry profile and LDH remain normal. The PERIPHERAL BLOOD flow cytometry in 06/2020 failed to document a monoclonal protein population of cells in peripheral circulation.       The patient was reviewed on 12/18/2020. On clinical examination I find nothing to suggest lymphoma recurrence in neck, axillas, groins. There is no liver enlargement, no splenomegaly, there are no B symptoms, normal white count, hemoglobin and platelets and white count differential. Chemistry profile is pending.    I do believe that the lymphoma in this patient is quiescent. I find no need for any other intervention on him neither radiological assessment unless that the clinical circumstances change. I will review him back in 6 months with a CBC and CMP. I find no need for radiological assessment then again unless that the clinical circumstances change.       Upon reviewing the patient on 06/28/2021, I find no symptoms or signs of lymphoma. Meticulous examination was done of the right axilla finding no abnormalities whatsoever. He has no peripheral adenopathy. His white count, hemoglobin and platelets are normal. His chemistry profile is completely normal.     I do believe that the patient’s lymphoma is very  quiet. I do not believe that he needs to pursue any other radiological analysis from my point of view. His cardiothoracic surgeon will follow up on his thoracic aneurysm at [facility] in the future and we can utilize that to follow up in regard to his right axillary adenopathy that is visible radiologically but not palpable clinically.     I asked him to come back and see us in a year.     The patient has completed his COVID vaccinations along with his wife.    On 06/15/2022 the patient returns along with his wife with no symptoms pertinent to previous history of lymphoma including no fever, no chills, very occasional nocturnal sweats. His clinical examination is completely normal with no palpable peripheral adenopathy, hepatosplenomegaly. His skin examination is normal with no evidence of skin cancer. He has no hepatomegaly, no splenomegaly. He has a small umbilical hernia. His aortic murmur is no different today of what it has been for the last 10 years or so. His white count, hemoglobin and platelets are normal, white count differential is normal, chemistry profile is normal.     Given the stability of the clinical picture I do believe that the patient will require reassessment in 1 year. He will have a CT scan of the chest for follow up of his thoracic aneurysm by the last week in 07/2022 by [facility] specialist. I asked him to call a few days after this for me to review this in Care Everywhere. I will call him with the report. Otherwise no other intervention from my point of view.     I advised him to have proper skin protection when he is out in the sun. He is extremely white, he has a lot of sun exposure. He will do that. I also allowed him to have Neutrogena 50 or something of this nature applied.    ·

## 2022-08-02 ENCOUNTER — TELEPHONE (OUTPATIENT)
Dept: ONCOLOGY | Facility: CLINIC | Age: 77
End: 2022-08-02

## 2022-08-02 NOTE — TELEPHONE ENCOUNTER
"  Caller: LIZ    Relationship: Self    Best call back number: [phone]    What is the best time to reach you: ANY    Who are you requesting to speak with (clinical staff, provider,  specific staff member): DR. DUMAS    Do you know the name of the person who called: LIZ    What was the call regarding: WHEN PATIENT SAW DR. DUMAS IN June, HE ASKED PATIENT TO LET HIM KNOW ONCE HE COMPLETED THE CT SCAN THAT PATIENTS CARDIOLOGIST REQUESTED & PATIENTS WANTS DR. DUMAS TO KNOW IT WAS DONE ON 7/29/2022 & IS IN HIS CHART UNDER \"CARE EVERYWHERE\".    Do you require a callback: YES, TO VERIFY THAT DR. DUMAS SAW THE RESULTS OF THE SCAN.            "

## 2022-08-03 ENCOUNTER — TELEPHONE (OUTPATIENT)
Dept: ONCOLOGY | Facility: CLINIC | Age: 77
End: 2022-08-03

## 2022-08-03 NOTE — TELEPHONE ENCOUNTER
Called pt to let him know Dr. Cohen has read the results of CT scan. Pt v/u. Yanira Carmichael RN

## 2022-08-03 NOTE — TELEPHONE ENCOUNTER
The patient has called the office for us to review the CT scan of the chest that has been done at [facility] in regard to his aortic aneurysm followup. Because he has had a lymphoma with recurrence in the right axilla, we requested this information. The reading says that the lymph node in the right axilla is 6 mm in size, it is smaller than before, and there is no other suggestion in the mediastinum or chest of any lymphadenopathy. The clinical examination on this patient and his laboratory parameters not too long ago were normal. For this reason the patient will remain in observation and we discussed with him the good news. He will be reviewed back in a year. He was gratified with the phone call.

## 2024-08-07 ENCOUNTER — LAB (OUTPATIENT)
Dept: LAB | Facility: HOSPITAL | Age: 79
End: 2024-08-07
Payer: MEDICARE

## 2024-08-07 ENCOUNTER — OFFICE VISIT (OUTPATIENT)
Dept: ONCOLOGY | Facility: CLINIC | Age: 79
End: 2024-08-07
Payer: MEDICARE

## 2024-08-07 VITALS
DIASTOLIC BLOOD PRESSURE: 78 MMHG | SYSTOLIC BLOOD PRESSURE: 153 MMHG | OXYGEN SATURATION: 97 % | TEMPERATURE: 97.6 F | HEART RATE: 63 BPM | BODY MASS INDEX: 25.24 KG/M2 | WEIGHT: 181 LBS

## 2024-08-07 DIAGNOSIS — C82.08 GRADE 1 FOLLICULAR LYMPHOMA OF LYMPH NODES OF MULTIPLE REGIONS: ICD-10-CM

## 2024-08-07 DIAGNOSIS — C82.08 GRADE 1 FOLLICULAR LYMPHOMA OF LYMPH NODES OF MULTIPLE REGIONS: Primary | ICD-10-CM

## 2024-08-07 LAB
ALBUMIN SERPL-MCNC: 4 G/DL (ref 3.5–5.2)
ALBUMIN/GLOB SERPL: 1.3 G/DL
ALP SERPL-CCNC: 72 U/L (ref 39–117)
ALT SERPL W P-5'-P-CCNC: 7 U/L (ref 1–41)
ANION GAP SERPL CALCULATED.3IONS-SCNC: 9.5 MMOL/L (ref 5–15)
AST SERPL-CCNC: 19 U/L (ref 1–40)
BASOPHILS # BLD AUTO: 0.02 10*3/MM3 (ref 0–0.2)
BASOPHILS NFR BLD AUTO: 0.3 % (ref 0–1.5)
BILIRUB SERPL-MCNC: 0.5 MG/DL (ref 0–1.2)
BUN SERPL-MCNC: 17 MG/DL (ref 8–23)
BUN/CREAT SERPL: 18.1 (ref 7–25)
CALCIUM SPEC-SCNC: 9.2 MG/DL (ref 8.6–10.5)
CHLORIDE SERPL-SCNC: 104 MMOL/L (ref 98–107)
CO2 SERPL-SCNC: 25.5 MMOL/L (ref 22–29)
CREAT SERPL-MCNC: 0.94 MG/DL (ref 0.76–1.27)
DEPRECATED RDW RBC AUTO: 45.6 FL (ref 37–54)
EGFRCR SERPLBLD CKD-EPI 2021: 84 ML/MIN/1.73
EOSINOPHIL # BLD AUTO: 0.18 10*3/MM3 (ref 0–0.4)
EOSINOPHIL NFR BLD AUTO: 2.8 % (ref 0.3–6.2)
ERYTHROCYTE [DISTWIDTH] IN BLOOD BY AUTOMATED COUNT: 13.8 % (ref 12.3–15.4)
GLOBULIN UR ELPH-MCNC: 3.1 GM/DL
GLUCOSE SERPL-MCNC: 88 MG/DL (ref 65–99)
HCT VFR BLD AUTO: 41.2 % (ref 37.5–51)
HGB BLD-MCNC: 13.6 G/DL (ref 13–17.7)
IMM GRANULOCYTES # BLD AUTO: 0.04 10*3/MM3 (ref 0–0.05)
IMM GRANULOCYTES NFR BLD AUTO: 0.6 % (ref 0–0.5)
LYMPHOCYTES # BLD AUTO: 1.33 10*3/MM3 (ref 0.7–3.1)
LYMPHOCYTES NFR BLD AUTO: 20.5 % (ref 19.6–45.3)
MCH RBC QN AUTO: 30 PG (ref 26.6–33)
MCHC RBC AUTO-ENTMCNC: 33 G/DL (ref 31.5–35.7)
MCV RBC AUTO: 90.9 FL (ref 79–97)
MONOCYTES # BLD AUTO: 0.44 10*3/MM3 (ref 0.1–0.9)
MONOCYTES NFR BLD AUTO: 6.8 % (ref 5–12)
NEUTROPHILS NFR BLD AUTO: 4.48 10*3/MM3 (ref 1.7–7)
NEUTROPHILS NFR BLD AUTO: 69 % (ref 42.7–76)
NRBC BLD AUTO-RTO: 0 /100 WBC (ref 0–0.2)
PLATELET # BLD AUTO: 168 10*3/MM3 (ref 140–450)
PMV BLD AUTO: 8.5 FL (ref 6–12)
POTASSIUM SERPL-SCNC: 4.5 MMOL/L (ref 3.5–5.2)
PROT SERPL-MCNC: 7.1 G/DL (ref 6–8.5)
RBC # BLD AUTO: 4.53 10*6/MM3 (ref 4.14–5.8)
SODIUM SERPL-SCNC: 139 MMOL/L (ref 136–145)
WBC NRBC COR # BLD AUTO: 6.49 10*3/MM3 (ref 3.4–10.8)

## 2024-08-07 PROCEDURE — 36415 COLL VENOUS BLD VENIPUNCTURE: CPT

## 2024-08-07 PROCEDURE — 3078F DIAST BP <80 MM HG: CPT | Performed by: NURSE PRACTITIONER

## 2024-08-07 PROCEDURE — 3077F SYST BP >= 140 MM HG: CPT | Performed by: NURSE PRACTITIONER

## 2024-08-07 PROCEDURE — 85025 COMPLETE CBC W/AUTO DIFF WBC: CPT

## 2024-08-07 PROCEDURE — 80053 COMPREHEN METABOLIC PANEL: CPT

## 2024-08-07 PROCEDURE — 99214 OFFICE O/P EST MOD 30 MIN: CPT | Performed by: NURSE PRACTITIONER

## 2024-08-07 PROCEDURE — 1126F AMNT PAIN NOTED NONE PRSNT: CPT | Performed by: NURSE PRACTITIONER

## 2024-08-07 NOTE — PROGRESS NOTES
REASONS FOR FOLLOWUP: Remote history of non-Hodgkin's lymphoma in late 1990s.    HISTORY OF PRESENT ILLNESS:  The patient is a 78 y.o. year old male  who is here for yearly follow-up with the above-mentioned history.  Patient has been long followed by Dr. Cohen in our practice who has now retired.  This patient is reviewed back today he is doing well from our standpoint with no new palpable adenopathy, no fevers, no weight loss or night sweats.  His CBC and CMP are completely WNL.    Patient did recently have a CT of his chest performed through [facility] to follow-up on aortic aneurysm and will see cardiology in follow-up next week.  Prior to that in April of this year he did have a CT of his abdomen/pelvis because of some abdominal pain at the time, again performed at [facility].  He had mild splenomegaly noted and mildly enlarged right pelvic sidewall lymph node.  Patient had concerns about this and I assured him that when compared to CTs dating back to 2020 these findings were the exact same and unchanged.    He is experiencing some shortness of breath with exertion which is being worked up further per cardiology.  His most recent CT of the chest did mention potential focal area of potential infectious versus inflammatory pneumonitis and also some mild interstitial edema.  We did discuss that he could potentially benefit from seeing pulmonology after cardiology workup is complete.    From our standpoint he is doing well however.        Past Medical History:   Diagnosis Date    Anxiety     Aortic insufficiency     Mild    Atrial fibrillation     Depression     Heart murmur     Hypertension     Lymphoma, non-Hodgkin's     Nonrheumatic aortic (valve) insufficiency     Nonrheumatic aortic (valve) stenosis     PAF (paroxysmal atrial fibrillation)     Polyarthralgia 06/1997    Sleep apnea     Stroke     eye     Past Surgical History:   Procedure Laterality Date    AXILLARY LYMPH NODE BIOPSY/EXCISION Left 12/1996     Well Child Visit at 30 Months   AMBULATORY CARE:   A well child visit  is when your child sees a healthcare provider to prevent health problems  Well child visits are used to track your child's growth and development  It is also a time for you to ask questions and to get information on how to keep your child safe  Write down your questions so you remember to ask them  Your child should have regular well child visits from birth to 16 years  Milestones of development your child may reach by 30 months (2½ years):  Each child develops at his or her own pace  Your child might have already reached the following milestones, or he or she may reach them later:  Use the toilet, or be close to being fully toilet trained    Know shapes and colors    Start playing with other children, and have friends    Wash and dry his or her hands    Throw a ball overhand, walk on his or her tiptoes, and jump up and down    Brush his or her teeth and put on clothes with help from an adult    Draw a line that goes from top to bottom    Say phrases of 3 to 4 words that people who know him or her can usually understand    Point to at least 6 body parts    Play with puzzles and other toys that need use of fine finger movements    Keep your child safe in the car: Always place your child in a rear-facing car seat  Choose a seat that meets the Federal Motor Vehicle Safety Standard 213  Make sure the child safety seat has a harness and clip  Also make sure that the harness and clips fit snugly against your child  There should be no more than a finger width of space between the strap and your child's chest  Ask your healthcare provider for more information on car safety seats  Always put your child's car seat in the back seat  Never put your child's car seat in the front  This will help prevent him or her from being injured if you get into an accident  Make your home safe for your child:   Place augustine at the top and bottom of stairs  Always make sure that the gate is closed and locked  [place] will help protect your child from injury  Go up and down stairs with your child to make sure he or she stays safe on the stairs  Place guards over windows on the second floor or higher  This will prevent your child from falling out of the window  Keep furniture away from windows  Use cordless window shades, or get cords that do not have loops  You can also cut the loops  A child's head can fall through a looped cord, and the cord can become wrapped around his or her neck  Secure heavy or large items  This includes bookshelves, TVs, dressers, cabinets, and lamps  Make sure these items are held in place or nailed into the wall  Keep all medicines, car supplies, lawn supplies, and cleaning supplies out of your child's reach  Keep these items in a locked cabinet or closet  Call Poison Control (1-285.785.2324) if your child eats anything that could be harmful  Keep hot items away from your child  Turn pot handles toward the back on the stove  Keep hot food and liquid out of your child's reach  Do not hold your child while you have a hot item in your hand or are near a lit stove  Do not leave curling irons or similar items on a counter  Your child may grab for the item and burn his or her hand  Store and lock all guns and weapons  Make sure all guns are unloaded before you store them  Make sure your child cannot reach or find where weapons or bullets are kept  Never  leave a loaded gun unattended  Keep your child safe in the sun and near water:   Always keep your child within reach near water  This includes any time you are near ponds, lakes, pools, the ocean, or the bathtub  Never  leave your child alone in the bathtub or sink  A child can drown in less than 1 inch of water  Put sunscreen on your child  Ask your healthcare provider which sunscreen is safe for your child   Do not apply sunscreen to your child's eyes, mouth, or CATARACT EXTRACTION      HEMORRHOIDECTOMY  1976, 1977    PROSTATE BIOPSY  03/2007       MEDICATIONS    Current Outpatient Medications:     amLODIPine (NORVASC) 2.5 MG tablet, Take 1 tablet by mouth Daily., Disp: , Rfl: 5    aspirin (aspirin) 81 MG EC tablet, Take  by mouth daily., Disp: , Rfl:     cyanocobalamin (VITAMIN B-12) 500 MCG tablet, Take  by mouth., Disp: , Rfl:     finasteride (PROSCAR) 5 MG tablet, Take 1 tablet by mouth Daily., Disp: , Rfl:     fluconazole (DIFLUCAN) 200 MG tablet, TAKE ONE TABLET ONCE A MONTH, Disp: , Rfl:     FLUoxetine (PROzac) 10 MG capsule, Take 1 capsule by mouth Daily., Disp: , Rfl:     mirtazapine (REMERON) 15 MG tablet, Take 1 tablet by mouth Daily., Disp: , Rfl:     nebivolol (BYSTOLIC) 5 MG tablet, TAKE 1 TABLET BY MOUTH EVERY DAY, Disp: , Rfl:     ALLERGIES:   No Known Allergies    SOCIAL HISTORY:       Social History     Socioeconomic History    Marital status:      Spouse name: Yue    Number of children: 1   Tobacco Use    Smoking status: Never    Smokeless tobacco: Never   Substance and Sexual Activity    Alcohol use: Yes     Comment: Occasional beer    Sexual activity: Defer         FAMILY HISTORY:  Family History   Problem Relation Age of Onset    Heart failure Mother     Heart failure Father     Lymphoma Maternal Aunt     Heart disease Other     Hypertension Other     Cancer Other         non-Hodgkin    Lymphoma Other         Non-Hodgin lymphoma       REVIEW OF SYSTEMS:  Review of Systems         Vitals:    08/07/24 1411   BP: 153/78   Pulse: 63   Temp: 97.6 °F (36.4 °C)   SpO2: 97%   Weight: 82.1 kg (181 lb)   PainSc: 0-No pain         8/7/2024     2:10 PM   Current Status   ECOG score 0        PHYSICAL EXAM:      CONSTITUTIONAL:  Vital signs reviewed.  No distress, looks comfortable.  EYES:  Conjunctiva and lids unremarkable.  PERRLA  EARS,NOSE,MOUTH,THROAT:  Ears and nose appear unremarkable.  Lips, teeth, gums appear unremarkable.  RESPIRATORY:  Normal  hands  Other ways to keep your child safe: Follow directions on the medicine label when you give your child medicine  Ask your child's healthcare provider for directions if you do not know how to give the medicine  If your child misses a dose, do not double the next dose  Ask how to make up the missed dose  Do not give aspirin to children under 25years of age  Your child could develop Reye syndrome if he takes aspirin  Reye syndrome can cause life-threatening brain and liver damage  Check your child's medicine labels for aspirin, salicylates, or oil of wintergreen  Keep plastic bags, latex balloons, and small objects away from your child  This includes marbles and small toys  These items can cause choking or suffocation  Regularly check the floor for these objects  Never leave your child in a room or outdoors alone  Make sure there is always a responsible adult with your child  Do not let your child play near the street  Even if he or she is playing in the front yard, he or she could run into the street  Get a bicycle helmet for your child  Make sure your child always wears a helmet, even when he or she goes on short tricycle rides  Your child should also wear a helmet if he or she rides in a passenger seat on an adult bicycle  Make sure the helmet fits correctly  Do not buy a larger helmet for your child to grow into  Buy a helmet that fits him or her now  Ask your child's healthcare provider for more information on bicycle helmets  What you need to know about nutrition for your child:   Give your child a variety of healthy foods  Healthy foods include fruits, vegetables, lean meats, and whole grains  Cut all foods into small pieces  Ask your healthcare provider how much of each type of food your child needs   The following are examples of healthy foods:    Whole grains such as bread, hot or cold cereal, and cooked pasta or rice    Protein from lean meats, chicken, fish, beans, or respiratory effort.  Lungs clear to auscultation bilaterally.  CARDIOVASCULAR:  Normal S1, S2.  No murmurs rubs or gallops.  No significant lower extremity edema.  GASTROINTESTINAL: Abdomen appears unremarkable.  Nontender.  No hepatomegaly.  No splenomegaly.  LYMPHATIC:  No cervical, supraclavicular, axillary lymphadenopathy.  MUSCULOSKELETAL:  Unremarkable gait and station.  Unremarkable digits/nails.  No cyanosis or clubbing.  SKIN:  Warm.  No rashes.  PSYCHIATRIC:  Normal judgment and insight.  Normal mood and affect.      RECENT LABS:  Results from last 7 days   Lab Units 08/07/24  1356   WBC 10*3/mm3 6.49   NEUTROS ABS 10*3/mm3 4.48   HEMOGLOBIN g/dL 13.6   HEMATOCRIT % 41.2   PLATELETS 10*3/mm3 168     Results from last 7 days   Lab Units 08/07/24  1356   SODIUM mmol/L 139   POTASSIUM mmol/L 4.5   CHLORIDE mmol/L 104   CO2 mmol/L 25.5   BUN mg/dL 17   CREATININE mg/dL 0.94   CALCIUM mg/dL 9.2   ALBUMIN g/dL 4.0   BILIRUBIN mg/dL 0.5   ALK PHOS U/L 72   ALT (SGPT) U/L 7   AST (SGOT) U/L 19   GLUCOSE mg/dL 88           ASSESSMENT/PLAN:  This is a 78 y.o. with a remote history of follicular lymphoma dating back to the late 1990s with no evidence of recurrence.  He has been followed annually in this office by Dr. Cohen who has not retired.  We discussed that at this point, with his disease being in the very remote past and thankfully with no evidence of recurrence, he does not need to continue formally following with hematology/oncology.  He is followed routinely by primary care and more recently by cardiology with ongoing evaluation of thoracic aortic aneurysm.  He did recently have CT of the chest to follow-up on this. He was incidentally noted to have a small focal area of infectious versus inflammatory pneumonitis.  There was also mention of mild interstitial edema.  He has been having some exertional shortness of breath and I encouraged him to discuss all of this further with cardiology in follow-up next  eggs    Dairy such as whole milk, cheese, or yogurt    Vegetables such as carrots, broccoli, or spinach    Fruits such as strawberries, oranges, apples, or tomatoes       Make sure your child gets enough calcium  Calcium is needed to build strong bones and teeth  Children need about 2 to 3 servings of dairy each day to get enough calcium  Good sources of calcium are low-fat dairy foods (milk, cheese, and yogurt)  A serving of dairy is 8 ounces of milk or yogurt, or 1½ ounces of cheese  Other foods that contain calcium include tofu, kale, spinach, broccoli, almonds, and calcium-fortified orange juice  Ask your child's healthcare provider for more information about the serving sizes of these foods  Limit foods high in fat and sugar  These foods do not have the nutrients your child needs to be healthy  Food high in fat and sugar include snack foods (potato chips, candy, and other sweets), juice, fruit drinks, and soda  If your child eats these foods often, he or she may eat fewer healthy foods during meals  He or she may gain too much weight  Do not give your child foods that could cause him or her to choke  Examples include nuts, popcorn, and hard, raw vegetables  Cut round or hard foods into thin slices  Grapes and hotdogs are examples of round foods  Carrots are an example of hard foods  Give your child 3 meals and 2 to 3 snacks per day  Cut all food into small pieces  Examples of healthy snacks include applesauce, bananas, crackers, and cheese  Have your child eat with other family members  This gives your child the opportunity to watch and learn how others eat  Let your child decide how much to eat  Give your child small portions  Let your child have another serving if he or she asks for one  Your child will be very hungry on some days and want to eat more  For example, your child may want to eat more on days when he or she is more active   Your child may also eat more if he or she is week. If he is deemed stable from a cardiac standpoint, could consider assessment by pulmonology.    From our standpoint we will not make follow-up in our office but rather be available in the future should he need us.  Patient and his wife are both in agreement with this plan.    I spent 35 minutes caring for Leroy on this date of service. This time includes time spent by me in the following activities: preparing for the visit, reviewing tests, performing a medically appropriate examination and/or evaluation, counseling and educating the patient/family/caregiver, documenting information in the medical record, care coordination, obtaining a separately obtained history, and reviewing a separately obtained history     going through a growth spurt  There may be days when your child eats less than usual          Know that picky eating is a normal behavior in children under 3years of age  Your child may like a certain food on one day and then decide he or she does not like it the next day  He or she may eat only 1 or 2 foods for a whole week or longer  Your child may not like mixed foods, or he or she may not want different foods on the plate to touch  These eating habits are all normal  Continue to offer 2 or 3 different foods at each meal, even if your child is going through this phase  Keep your child's teeth healthy:   Your child needs to brush his or her teeth with fluoride toothpaste 2 times each day  He or she also needs to floss 1 time each day  Help your child brush his or her teeth for at least 2 minutes  Apply a small amount of toothpaste the size of a pea on the toothbrush  Make sure your child spits all of the toothpaste out  Your child does not need to rinse his or her mouth with water  The small amount of toothpaste that stays in his or her mouth can help prevent cavities  Help your child brush and floss until he or she gets older and can do it properly  Take your child to the dentist regularly  A dentist can make sure your child's teeth and gums are developing properly  Your child may be given a fluoride treatment to prevent cavities  Ask your child's dentist how often he or she needs to visit  Create routines for your child:   Have your child take at least 1 nap each day  Plan the nap early enough in the day so your child is still tired at bedtime  Create a bedtime routine  This may include 1 hour of calm and quiet activities before bed  You can read to your child or listen to music  Brush your child's teeth during his or her bedtime routine  Plan for family time  Start family traditions such as going for a walk, listening to music, or playing games  Do not watch TV during family time   Have your child play with other family members during family time  What you need to know about toilet training: Your child will need to be toilet trained before he or she can attend  or other programs  Be patient and consistent  If your child is working on toilet training, be patient  Do not yell at your child or try to force him or her to use the toilet  Praise him or her for using the toilet, and be consistent about when he or she is expected to use it  Create a routine  Put your child on the toilet regularly, such as every 1 to 2 hours  This will help him or her get used to using the toilet  It will also help create a routine and lower the risk for accidents  Help your child understand how to use the toilet  Read books with your child about how to use the toilet  Take him or her into the bathroom with a parent or older brother or sister  Let your child practice sitting on the toilet with his or her clothes on  Dress your child to make the toilet easy to use  Dress him or her in clothes that are easy to take off and put back on  When you take your child out, plan for several trips to the bathroom  Bring a change of clothing in case your child has an accident  Other ways to support your child:   Do not punish your child with hitting, spanking, or yelling  Never  shake your child  Tell your child "no " Give your child short and simple rules  Do not allow your child to hit, kick, or bite another person  Put your child in time-out for 1 to 2 minutes in his or her crib or playpen  You can distract your child with a new activity when he or she behaves badly  Make sure everyone who cares for your child disciplines him or her the same way  Be firm and consistent with tantrums  Temper tantrums are normal at 2½ years  Your child may cry, yell, kick, or refuse to do what he or she is told  Stay calm and be firm  Reward your child for good behavior   This will encourage your child to behave well     Read to your child  This will comfort your child and help his or her brain develop  Reading also helps your child get ready for school  Point to pictures as you read  This will help your child make connections between pictures and words  He or she may enjoy going to [employer] to hear stories read aloud  Let him or her choose books to bring home to read together  Have other family members or caregivers read to your child  Your child may want to hear the same book over and over  This is normal at 2½ years  He or she may also want it read the same way every time  Play with your child  This will help your child develop social skills, motor skills, and speech  Take your child to places that will help him or her learn and discover  For example, a children'Graze will allow him or her to touch and play with objects as he or she learns  Take your child to play groups or activities  Let your child play with other children  This will help him or her grow and develop  Your child might not be willing to share his or her toys  Engage with your child if he or she watches TV  Do not let your child watch TV alone, if possible  You or another adult should watch with your child  Talk with your child about what he or she is watching  When TV time is done, try to apply what you and your child saw  For example, if your child saw someone naming shapes, have your child find objects in those same shapes  TV time should never replace active playtime  Turn the TV off when your child plays  Do not let your child watch TV during meals or within 1 hour of bedtime  Limit your child's screen time  Screen time is the amount of television, computer, smart phone, and video game time your child has each day  It is important to limit screen time  This helps your child get enough sleep, physical activity, and social interaction each day  Your child's pediatrician can help you create a screen time plan   The daily limit is usually 1 hour for children 2 to 5 years  The daily limit is usually 2 hours for children 6 years or older  You can also set limits on the kinds of devices your child can use, and where he or she can use them  Keep the plan where your child and anyone who takes care of him or her can see it  Create a plan for each child in your family  You can also go to [employer]/English/Rad/Pages/default  aspx#planview for more help creating a plan  Talk to your child's healthcare provider about school readiness  Your child's healthcare provider can talk with you about options for  or other programs that can help him or her get ready for school  He or she will need to be fully toilet trained and able to be away from you for a few hours  What you need to know about your child's next well child visit:  Your child's healthcare provider will tell you when to bring your child in again  The next well child visit is usually at 3 years  Contact your child's healthcare provider if you have questions or concerns about his or her health or care before the next visit  Your child may need vaccines at the next well child visit  Your provider will tell you which vaccines your child needs and when your child should get them  © Copyright [employer] 2022 Information is for End User's use only and may not be sold, redistributed or otherwise used for commercial purposes  All illustrations and images included in CareNotes® are the copyrighted property of [employer]  or Jeri Madrigal   The above information is an  only  It is not intended as medical advice for individual conditions or treatments  Talk to your doctor, nurse or pharmacist before following any medical regimen to see if it is safe and effective for you

## 2025-04-16 ENCOUNTER — APPOINTMENT (OUTPATIENT)
Dept: GENERAL RADIOLOGY | Facility: HOSPITAL | Age: 80
End: 2025-04-16
Payer: MEDICARE

## 2025-04-16 ENCOUNTER — HOSPITAL ENCOUNTER (EMERGENCY)
Facility: HOSPITAL | Age: 80
Discharge: HOME | End: 2025-04-16
Attending: EMERGENCY MEDICINE | Admitting: EMERGENCY MEDICINE
Payer: MEDICARE

## 2025-04-16 VITALS
HEIGHT: 70 IN | DIASTOLIC BLOOD PRESSURE: 89 MMHG | BODY MASS INDEX: 25.05 KG/M2 | SYSTOLIC BLOOD PRESSURE: 158 MMHG | HEART RATE: 65 BPM | RESPIRATION RATE: 18 BRPM | OXYGEN SATURATION: 96 % | TEMPERATURE: 97.3 F | WEIGHT: 175 LBS

## 2025-04-16 DIAGNOSIS — S51.011A SKIN TEAR OF RIGHT ELBOW WITHOUT COMPLICATION, INITIAL ENCOUNTER: Primary | ICD-10-CM

## 2025-04-16 DIAGNOSIS — S80.01XA CONTUSION OF RIGHT KNEE, INITIAL ENCOUNTER: ICD-10-CM
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
## 2025-04-16 PROCEDURE — 73070 X-RAY EXAM OF ELBOW: CPT

## 2025-04-16 PROCEDURE — 99283 EMERGENCY DEPT VISIT LOW MDM: CPT

## 2025-04-16 PROCEDURE — 73560 X-RAY EXAM OF KNEE 1 OR 2: CPT

## 2025-04-17 NOTE — ED PROVIDER NOTES
EMERGENCY DEPARTMENT ENCOUNTER    Room Number:  27/27  PCP: Rhianna Patrick MD    HPI:  Chief Complaint: Fall  A complete HPI/ROS/PMH/PSH/SH/FH are unobtainable due to: None  Context: Leroy Richardson is a 79 y.o. male who presents to the ED c/o acute fall.  He states that he was coming out of [facility] after he just 1.  He had a mechanical fall in the parking lot tripping on a wheel stopped.  He did not hit his head.  He is confident of that.  He does take Eliquis for atrial fibrillation.  He complains of pain and abrasion to the right elbow as well as a bruise to the right knee.  He states that his tetanus vaccine is up-to-date.        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/12/2018    Serum sickness due to drug 03/05/2020     Resolved Ambulatory Problems     Diagnosis Date Noted    No Resolved Ambulatory Problems     Past Medical History:   Diagnosis Date    Anxiety     Aortic insufficiency     Atrial fibrillation     Depression     Heart murmur     Lymphoma, non-Hodgkin's     Nonrheumatic aortic (valve) insufficiency     Nonrheumatic aortic (valve) stenosis     PAF (paroxysmal atrial fibrillation)     Polyarthralgia 06/1997    Stroke          PAST SURGICAL HISTORY  Past Surgical History:   Procedure Laterality Date    AXILLARY LYMPH NODE BIOPSY/EXCISION Left 12/1996    CATARACT EXTRACTION      HEMORRHOIDECTOMY  1976, 1977    PROSTATE BIOPSY  03/2007         FAMILY HISTORY  Family History   Problem Relation Age of Onset    Heart failure Mother     Heart failure Father     Lymphoma Maternal Aunt     Heart disease Other      Hypertension Other     Cancer Other         non-Hodgkin    Lymphoma Other         Non-Hodgin lymphoma         SOCIAL HISTORY  Social History     Socioeconomic History    Marital status:      Spouse name: Yue    Number of children: 1   Tobacco Use    Smoking status: Never    Smokeless tobacco: Never   Substance and Sexual Activity    Alcohol use: Yes     Comment: Occasional beer    Sexual activity: Defer         ALLERGIES  Patient has no known allergies.        REVIEW OF SYSTEMS  Review of Systems     Included in HPI  All systems reviewed and negative except for those discussed in HPI.       PHYSICAL EXAM  ED Triage Vitals   Temp Heart Rate Resp BP SpO2   04/16/25 2218 04/16/25 2218 04/16/25 2218 04/16/25 2221 04/16/25 2218   97.3 °F (36.3 °C) 65 18 158/89 96 %      Temp src Heart Rate Source Patient Position BP Location FiO2 (%)   04/16/25 2218 04/16/25 2218 -- -- --   Tympanic Monitor          Physical Exam      GENERAL: no acute distress  HENT: nares patent  EYES: no scleral icterus  CV: regular rhythm, normal rate  RESPIRATORY: normal effort  ABDOMEN: soft, nontender  MUSCULOSKELETAL: no deformity, no C/T/L-spine tenderness, no chest wall tenderness, no focal tenderness to the bilateral upper extremities.  He has mild pain to the inferolateral portion of the right knee, otherwise no pain to palpation of his lower extremities or with passive range of motion  NEURO: alert, moves all extremities, follows commands  PSYCH:  calm, cooperative  SKIN: warm, dry, skin tear to the right elbow, small bruise to the right inferolateral knee    Vital signs and nursing notes reviewed.          LAB RESULTS  No results found for this or any previous visit (from the past 24 hours).    Ordered the above labs and reviewed the results.        RADIOLOGY  XR Knee 1 or 2 View Right  Result Date: 4/16/2025  2 VIEWS RIGHT KNEE  HISTORY: Fall  COMPARISON: None available.  FINDINGS: No acute fracture or subluxation of the right  knee is seen. There is no effusion. There are mild degenerative changes.      No acute fracture or subluxation identified.  This report was finalized on 4/16/2025 11:39 PM by Dr. Bev Tan M.D on Workstation: [id number]      XR Elbow 2 View Right  Result Date: 4/16/2025  2 VIEWS RIGHT ELBOW  HISTORY: Fall  COMPARISON: None available.  FINDINGS: No acute fracture or subluxation of the right elbow is seen. There is really no significant degenerative change. There is no elbow effusion.      No acute fracture or subluxation identified.  This report was finalized on 4/16/2025 11:38 PM by Dr. Bev Tan M.D on Workstation: [id number]        Ordered the above noted radiological studies. Reviewed by me in PACS.        MEDICATIONS GIVEN IN ER  Medications - No data to display      ORDERS PLACED DURING THIS VISIT:  Orders Placed This Encounter   Procedures    XR Elbow 2 View Right    XR Knee 1 or 2 View Right         OUTPATIENT MEDICATION MANAGEMENT:  No current Epic-ordered facility-administered medications on file.     Current Outpatient Medications Ordered in Epic   Medication Sig Dispense Refill    amLODIPine (NORVASC) 2.5 MG tablet Take 1 tablet by mouth Daily.  5    aspirin (aspirin) 81 MG EC tablet Take  by mouth daily.      cyanocobalamin (VITAMIN B-12) 500 MCG tablet Take  by mouth.      finasteride (PROSCAR) 5 MG tablet Take 1 tablet by mouth Daily.      fluconazole (DIFLUCAN) 200 MG tablet TAKE ONE TABLET ONCE A MONTH      FLUoxetine (PROzac) 10 MG capsule Take 1 capsule by mouth Daily.      mirtazapine (REMERON) 15 MG tablet Take 1 tablet by mouth Daily.      nebivolol (BYSTOLIC) 5 MG tablet TAKE 1 TABLET BY MOUTH EVERY DAY         PROCEDURES  Procedures          MEDICAL DECISION MAKING, PROGRESS, and CONSULTS    Discussion below represents my analysis of pertinent findings related to patient's condition, differential diagnosis, treatment plan and final disposition.            Differential  diagnosis:    Fracture, dislocation, bruising, skin tear               Independent interpretation of labs, radiology studies, and discussions with consultants:  ED Course as of 04/16/25 2347   Wed Apr 16, 2025   2246 Scalp is atraumatic.  He is confident that he did not hit his head.  As such, I will not get a CT scan of the head. [TD]   2319 X-ray of the right knee independently interpreted by myself.  No fracture or dislocation. [TD]   2320 X-ray of the right elbow independently interpreted by myself.  No fracture or dislocation. [TD]      ED Course User Index  [TD] Roberto Agarwal II, MD           Clinical Scores:                                   DIAGNOSIS  Final diagnoses:   Skin tear of right elbow without complication, initial encounter   Contusion of right knee, initial encounter   Fall, initial encounter         DISPOSITION  DISCHARGE    FOLLOW-UP  Rhianna Patrick MD  [id number] [id number] [phone]    Schedule an appointment as soon as possible for a visit in 1 week  As needed    [facility] EMERGENCY DEPARTMENT  [address] [facility] [address] [phone]  Go to   If symptoms worsen         Medication List      No changes were made to your prescriptions during this visit.             Latest Documented Vital Signs:  As of 23:47 EDT  BP- 158/89 HR- 65 Temp- 97.3 °F (36.3 °C) (Tympanic) O2 sat- 96%      --    Please note that portions of this were completed with a voice recognition program.       Note Disclaimer: At [facility], we believe that sharing information builds trust and better relationships. You are receiving this note because you are receiving care at [facility] or recently visited. It is possible you will see health information before a provider has talked with you about it. This kind of information can be easy to misunderstand. To help you fully understand what it means for your health, we urge you to discuss this note  with your provider.         Roberto Agarwal II, MD  04/16/25 0216